# Patient Record
Sex: FEMALE | Race: WHITE | Employment: OTHER | URBAN - NONMETROPOLITAN AREA
[De-identification: names, ages, dates, MRNs, and addresses within clinical notes are randomized per-mention and may not be internally consistent; named-entity substitution may affect disease eponyms.]

---

## 2017-11-17 ENCOUNTER — HOSPITAL ENCOUNTER (INPATIENT)
Age: 82
LOS: 2 days | Discharge: HOME | DRG: 392 | End: 2017-11-19
Attending: INTERNAL MEDICINE | Admitting: INTERNAL MEDICINE
Payer: MEDICARE

## 2017-11-17 ENCOUNTER — APPOINTMENT (OUTPATIENT)
Dept: GENERAL RADIOLOGY | Age: 82
DRG: 392 | End: 2017-11-17
Payer: MEDICARE

## 2017-11-17 DIAGNOSIS — E87.6 HYPOKALEMIA: ICD-10-CM

## 2017-11-17 DIAGNOSIS — K52.9 GASTROENTERITIS: ICD-10-CM

## 2017-11-17 DIAGNOSIS — E86.0 DEHYDRATION: Primary | ICD-10-CM

## 2017-11-17 DIAGNOSIS — N17.9 ACUTE RENAL FAILURE, UNSPECIFIED ACUTE RENAL FAILURE TYPE (HCC): ICD-10-CM

## 2017-11-17 DIAGNOSIS — R00.1 BRADYCARDIA: ICD-10-CM

## 2017-11-17 LAB
ALBUMIN SERPL-MCNC: 3.7 G/DL (ref 3.5–5.1)
ALP BLD-CCNC: 82 U/L (ref 38–126)
ALT SERPL-CCNC: 13 U/L (ref 11–66)
AMYLASE: 21 U/L (ref 20–104)
ANION GAP SERPL CALCULATED.3IONS-SCNC: 16 MEQ/L (ref 8–16)
AST SERPL-CCNC: 21 U/L (ref 5–40)
BACTERIA: ABNORMAL /HPF
BANDED NEUTROPHILS ABSOLUTE COUNT: 0.3 THOU/MM3
BANDS PRESENT: 3 %
BASOPHILS # BLD: 0 %
BASOPHILS ABSOLUTE: 0 THOU/MM3 (ref 0–0.1)
BILIRUB SERPL-MCNC: 0.8 MG/DL (ref 0.3–1.2)
BILIRUBIN DIRECT: < 0.2 MG/DL (ref 0–0.3)
BILIRUBIN URINE: ABNORMAL
BLOOD, URINE: NEGATIVE
BUN BLDV-MCNC: 45 MG/DL (ref 7–22)
CALCIUM SERPL-MCNC: 8.8 MG/DL (ref 8.5–10.5)
CASTS 2: ABNORMAL /LPF
CASTS UA: ABNORMAL /LPF
CHARACTER, URINE: ABNORMAL
CHLORIDE BLD-SCNC: 99 MEQ/L (ref 98–111)
CO2: 22 MEQ/L (ref 23–33)
COLOR: YELLOW
CREAT SERPL-MCNC: 1.6 MG/DL (ref 0.4–1.2)
CREATININE URINE: 157.2 MG/DL
CRYSTALS, UA: ABNORMAL
DIFFERENTIAL, MANUAL: NORMAL
EKG ATRIAL RATE: 86 BPM
EKG P AXIS: 27 DEGREES
EKG P-R INTERVAL: 424 MS
EKG Q-T INTERVAL: 390 MS
EKG QRS DURATION: 136 MS
EKG QTC CALCULATION (BAZETT): 466 MS
EKG R AXIS: -61 DEGREES
EKG T AXIS: -7 DEGREES
EKG VENTRICULAR RATE: 86 BPM
EOSINOPHIL # BLD: 0 %
EOSINOPHILS ABSOLUTE: 0 THOU/MM3 (ref 0–0.4)
EPITHELIAL CELLS, UA: ABNORMAL /HPF
GFR SERPL CREATININE-BSD FRML MDRD: 30 ML/MIN/1.73M2
GLUCOSE BLD-MCNC: 128 MG/DL (ref 70–108)
GLUCOSE URINE: NEGATIVE MG/DL
HCT VFR BLD CALC: 41.4 % (ref 37–47)
HEMOGLOBIN: 14 GM/DL (ref 12–16)
ICTOTEST: NEGATIVE
KETONES, URINE: NEGATIVE
LACTIC ACID: 1 MMOL/L (ref 0.5–2.2)
LEUKOCYTE ESTERASE, URINE: NEGATIVE
LIPASE: 8.3 U/L (ref 5.6–51.3)
LYMPHOCYTES # BLD: 8 %
LYMPHOCYTES ABSOLUTE: 0.7 THOU/MM3 (ref 1–4.8)
MAGNESIUM: 2 MG/DL (ref 1.6–2.4)
MCH RBC QN AUTO: 30.1 PG (ref 27–31)
MCHC RBC AUTO-ENTMCNC: 33.9 GM/DL (ref 33–37)
MCV RBC AUTO: 89 FL (ref 81–99)
MISCELLANEOUS 2: ABNORMAL
MONOCYTES # BLD: 3 %
MONOCYTES ABSOLUTE: 0.3 THOU/MM3 (ref 0.4–1.3)
NITRITE, URINE: NEGATIVE
NUCLEATED RED BLOOD CELLS: 0 /100 WBC
OSMOLALITY CALCULATION: 287 MOSMOL/KG (ref 275–300)
PDW BLD-RTO: 13.8 % (ref 11.5–14.5)
PH UA: 5
PLATELET # BLD: 205 THOU/MM3 (ref 130–400)
PLATELET ESTIMATE: ADEQUATE
PMV BLD AUTO: 8.7 MCM (ref 7.4–10.4)
POTASSIUM SERPL-SCNC: 3.1 MEQ/L (ref 3.5–5.2)
PRO-BNP: 7128 PG/ML (ref 0–1800)
PROTEIN UA: 30
RBC # BLD: 4.65 MILL/MM3 (ref 4.2–5.4)
RBC URINE: ABNORMAL /HPF
RENAL EPITHELIAL, UA: ABNORMAL
SEG NEUTROPHILS: 86 %
SEGMENTED NEUTROPHILS ABSOLUTE COUNT: 7.3 THOU/MM3 (ref 1.8–7.7)
SODIUM BLD-SCNC: 137 MEQ/L (ref 135–145)
SPECIFIC GRAVITY, URINE: 1.02 (ref 1–1.03)
TOTAL PROTEIN: 7.2 G/DL (ref 6.1–8)
UROBILINOGEN, URINE: 0.2 EU/DL
WBC # BLD: 8.5 THOU/MM3 (ref 4.8–10.8)
WBC UA: ABNORMAL /HPF
YEAST: ABNORMAL

## 2017-11-17 PROCEDURE — 6360000002 HC RX W HCPCS

## 2017-11-17 PROCEDURE — 71020 XR CHEST STANDARD TWO VW: CPT

## 2017-11-17 PROCEDURE — 6360000002 HC RX W HCPCS: Performed by: INTERNAL MEDICINE

## 2017-11-17 PROCEDURE — 82570 ASSAY OF URINE CREATININE: CPT

## 2017-11-17 PROCEDURE — 81001 URINALYSIS AUTO W/SCOPE: CPT

## 2017-11-17 PROCEDURE — 85025 COMPLETE CBC W/AUTO DIFF WBC: CPT

## 2017-11-17 PROCEDURE — 96361 HYDRATE IV INFUSION ADD-ON: CPT

## 2017-11-17 PROCEDURE — 93005 ELECTROCARDIOGRAM TRACING: CPT

## 2017-11-17 PROCEDURE — 99285 EMERGENCY DEPT VISIT HI MDM: CPT

## 2017-11-17 PROCEDURE — 1200000003 HC TELEMETRY R&B

## 2017-11-17 PROCEDURE — 96374 THER/PROPH/DIAG INJ IV PUSH: CPT

## 2017-11-17 PROCEDURE — 82248 BILIRUBIN DIRECT: CPT

## 2017-11-17 PROCEDURE — 80053 COMPREHEN METABOLIC PANEL: CPT

## 2017-11-17 PROCEDURE — 83735 ASSAY OF MAGNESIUM: CPT

## 2017-11-17 PROCEDURE — 83880 ASSAY OF NATRIURETIC PEPTIDE: CPT

## 2017-11-17 PROCEDURE — 83605 ASSAY OF LACTIC ACID: CPT

## 2017-11-17 PROCEDURE — 84300 ASSAY OF URINE SODIUM: CPT

## 2017-11-17 PROCEDURE — 2580000003 HC RX 258: Performed by: INTERNAL MEDICINE

## 2017-11-17 PROCEDURE — 36415 COLL VENOUS BLD VENIPUNCTURE: CPT

## 2017-11-17 PROCEDURE — 83690 ASSAY OF LIPASE: CPT

## 2017-11-17 PROCEDURE — 82150 ASSAY OF AMYLASE: CPT

## 2017-11-17 RX ORDER — SODIUM CHLORIDE 0.9 % (FLUSH) 0.9 %
10 SYRINGE (ML) INJECTION EVERY 12 HOURS SCHEDULED
Status: DISCONTINUED | OUTPATIENT
Start: 2017-11-18 | End: 2017-11-19 | Stop reason: HOSPADM

## 2017-11-17 RX ORDER — LEVOTHYROXINE SODIUM 0.1 MG/1
100 TABLET ORAL DAILY
Status: DISCONTINUED | OUTPATIENT
Start: 2017-11-18 | End: 2017-11-19 | Stop reason: HOSPADM

## 2017-11-17 RX ORDER — NITROGLYCERIN 0.4 MG/1
0.4 TABLET SUBLINGUAL EVERY 5 MIN PRN
Status: DISCONTINUED | OUTPATIENT
Start: 2017-11-17 | End: 2017-11-19 | Stop reason: HOSPADM

## 2017-11-17 RX ORDER — GUAIFENESIN 600 MG/1
600 TABLET, EXTENDED RELEASE ORAL 2 TIMES DAILY
Status: DISCONTINUED | OUTPATIENT
Start: 2017-11-18 | End: 2017-11-18

## 2017-11-17 RX ORDER — PANTOPRAZOLE SODIUM 40 MG/1
40 TABLET, DELAYED RELEASE ORAL
Status: DISCONTINUED | OUTPATIENT
Start: 2017-11-18 | End: 2017-11-19 | Stop reason: HOSPADM

## 2017-11-17 RX ORDER — RANOLAZINE 500 MG/1
500 TABLET, EXTENDED RELEASE ORAL 2 TIMES DAILY
Status: DISCONTINUED | OUTPATIENT
Start: 2017-11-18 | End: 2017-11-18

## 2017-11-17 RX ORDER — ONDANSETRON 2 MG/ML
4 INJECTION INTRAMUSCULAR; INTRAVENOUS ONCE
Status: COMPLETED | OUTPATIENT
Start: 2017-11-17 | End: 2017-11-17

## 2017-11-17 RX ORDER — PRAVASTATIN SODIUM 20 MG
20 TABLET ORAL NIGHTLY
Status: DISCONTINUED | OUTPATIENT
Start: 2017-11-18 | End: 2017-11-19 | Stop reason: HOSPADM

## 2017-11-17 RX ORDER — SODIUM CHLORIDE 0.9 % (FLUSH) 0.9 %
10 SYRINGE (ML) INJECTION PRN
Status: DISCONTINUED | OUTPATIENT
Start: 2017-11-17 | End: 2017-11-19 | Stop reason: HOSPADM

## 2017-11-17 RX ORDER — HEPARIN SODIUM 5000 [USP'U]/ML
5000 INJECTION, SOLUTION INTRAVENOUS; SUBCUTANEOUS EVERY 8 HOURS SCHEDULED
Status: DISCONTINUED | OUTPATIENT
Start: 2017-11-17 | End: 2017-11-19 | Stop reason: HOSPADM

## 2017-11-17 RX ORDER — AMLODIPINE BESYLATE 5 MG/1
5 TABLET ORAL NIGHTLY
Status: DISCONTINUED | OUTPATIENT
Start: 2017-11-18 | End: 2017-11-19 | Stop reason: HOSPADM

## 2017-11-17 RX ORDER — ACETAMINOPHEN 325 MG/1
650 TABLET ORAL EVERY 4 HOURS PRN
Status: DISCONTINUED | OUTPATIENT
Start: 2017-11-17 | End: 2017-11-19 | Stop reason: HOSPADM

## 2017-11-17 RX ORDER — ONDANSETRON 2 MG/ML
4 INJECTION INTRAMUSCULAR; INTRAVENOUS EVERY 6 HOURS PRN
Status: DISCONTINUED | OUTPATIENT
Start: 2017-11-17 | End: 2017-11-19 | Stop reason: HOSPADM

## 2017-11-17 RX ORDER — CLOPIDOGREL BISULFATE 75 MG/1
75 TABLET ORAL DAILY
Status: DISCONTINUED | OUTPATIENT
Start: 2017-11-18 | End: 2017-11-18

## 2017-11-17 RX ORDER — ASPIRIN 81 MG/1
81 TABLET ORAL DAILY
Status: DISCONTINUED | OUTPATIENT
Start: 2017-11-18 | End: 2017-11-18

## 2017-11-17 RX ORDER — ONDANSETRON 2 MG/ML
INJECTION INTRAMUSCULAR; INTRAVENOUS
Status: COMPLETED
Start: 2017-11-17 | End: 2017-11-17

## 2017-11-17 RX ORDER — 0.9 % SODIUM CHLORIDE 0.9 %
1000 INTRAVENOUS SOLUTION INTRAVENOUS ONCE
Status: COMPLETED | OUTPATIENT
Start: 2017-11-17 | End: 2017-11-17

## 2017-11-17 RX ORDER — ALBUTEROL SULFATE 90 UG/1
2 AEROSOL, METERED RESPIRATORY (INHALATION) EVERY 6 HOURS PRN
Status: DISCONTINUED | OUTPATIENT
Start: 2017-11-17 | End: 2017-11-19 | Stop reason: HOSPADM

## 2017-11-17 RX ORDER — ALPRAZOLAM 0.25 MG/1
0.25 TABLET ORAL 3 TIMES DAILY PRN
Status: DISCONTINUED | OUTPATIENT
Start: 2017-11-17 | End: 2017-11-19 | Stop reason: HOSPADM

## 2017-11-17 RX ORDER — CARVEDILOL 6.25 MG/1
6.25 TABLET ORAL 2 TIMES DAILY WITH MEALS
Status: DISCONTINUED | OUTPATIENT
Start: 2017-11-18 | End: 2017-11-19 | Stop reason: HOSPADM

## 2017-11-17 RX ORDER — POTASSIUM CHLORIDE 14.9 MG/ML
20 INJECTION INTRAVENOUS ONCE
Status: COMPLETED | OUTPATIENT
Start: 2017-11-17 | End: 2017-11-17

## 2017-11-17 RX ORDER — POTASSIUM CHLORIDE 750 MG/1
10 TABLET, FILM COATED, EXTENDED RELEASE ORAL DAILY
Status: DISCONTINUED | OUTPATIENT
Start: 2017-11-18 | End: 2017-11-19 | Stop reason: HOSPADM

## 2017-11-17 RX ORDER — ISOSORBIDE MONONITRATE 60 MG/1
60 TABLET, EXTENDED RELEASE ORAL 2 TIMES DAILY
Status: DISCONTINUED | OUTPATIENT
Start: 2017-11-18 | End: 2017-11-18

## 2017-11-17 RX ORDER — SODIUM CHLORIDE 9 MG/ML
INJECTION, SOLUTION INTRAVENOUS CONTINUOUS
Status: DISCONTINUED | OUTPATIENT
Start: 2017-11-17 | End: 2017-11-19 | Stop reason: HOSPADM

## 2017-11-17 RX ADMIN — ONDANSETRON 4 MG: 2 INJECTION INTRAMUSCULAR; INTRAVENOUS at 18:44

## 2017-11-17 RX ADMIN — POTASSIUM CHLORIDE 20 MEQ: 200 INJECTION, SOLUTION INTRAVENOUS at 21:08

## 2017-11-17 RX ADMIN — SODIUM CHLORIDE 1000 ML: 9 INJECTION, SOLUTION INTRAVENOUS at 18:44

## 2017-11-17 ASSESSMENT — ENCOUNTER SYMPTOMS
RHINORRHEA: 0
BLOOD IN STOOL: 0
DIARRHEA: 1
WHEEZING: 0
EYE PAIN: 0
EYE DISCHARGE: 0
ABDOMINAL PAIN: 0
SHORTNESS OF BREATH: 0
SORE THROAT: 0
VOMITING: 1
CONSTIPATION: 0
BACK PAIN: 0
COUGH: 0
NAUSEA: 1

## 2017-11-17 NOTE — ED TRIAGE NOTES
Presents to ED with n/v/d that started yesterday evening patient was given 4 zofran by ems and resting in bed with resp easy and unlabored at this time call light in reach family at bedside

## 2017-11-17 NOTE — ED PROVIDER NOTES
[facility]  eMERGENCY dEPARTMENT eNCOUnter          [address] [facility]       Chief Complaint   Patient presents with    Nausea    Emesis    Diarrhea       Nurses Notes reviewed and I agree except as noted in the HPI. HISTORY OF PRESENT ILLNESS    Giuliana Chris is a 80 y.o. female who presents to the Emergency Department via EMS transport for the evaluation of nausea, vomiting, and diarrhea. Patient states that she has had symptoms since 11:30 pm last evening, and reports that she had meat loaf for dinner prior to experiencing them. She states that she also feels fatigued and has had chills. She reports that she has had recent sick exposure as her daughter was sick with similar symptoms earlier this week. She reports that she was given 4 Zofran en route to the hospital by EMS. Patient states that she is unsure how many episodes of diarrhea or vomiting she has had because she has lost count. She denies experiencing any chest pain, shortness of breath, abdominal pain, dysuria, dizziness, weakness, decreased urine output, or fever. She reports that she has not had a lot of fluids today. Patient reports that she has a history of hypertension, GERD, CAD, CHF, and chronic kidney disease. She states that her cardiologist is Dr. Joaquim Gutierrez, and that she last saw him in September. Patient denies further complaints at initial encounter. The HPI was provided by the patient. REVIEW OF SYSTEMS     Review of Systems   Constitutional: Positive for chills and fatigue. Negative for appetite change, diaphoresis and fever. HENT: Negative for congestion, ear pain, rhinorrhea and sore throat. Eyes: Negative for pain, discharge and visual disturbance. Respiratory: Negative for cough, shortness of breath and wheezing. Cardiovascular: Negative for chest pain, palpitations and leg swelling. Gastrointestinal: Positive for diarrhea, nausea and vomiting.  Negative for abdominal pain, blood in stool and constipation. Genitourinary: Negative for decreased urine volume, difficulty urinating, dysuria, frequency, hematuria, urgency and vaginal discharge. Musculoskeletal: Negative for arthralgias, back pain, joint swelling and neck pain. Skin: Negative for pallor and rash. Neurological: Negative for dizziness, syncope, weakness, light-headedness, numbness and headaches. Hematological: Negative for adenopathy. Psychiatric/Behavioral: Negative for confusion, dysphoric mood and suicidal ideas. The patient is not nervous/anxious. PAST MEDICAL HISTORY    has a past medical history of Blood transfusion reaction; CAD (coronary artery disease); CHF (congestive heart failure) ([facility]); CKD (chronic kidney disease) stage 3, GFR 30-59 ml/min; GERD (gastroesophageal reflux disease); History of bleeding ulcers; Hypertension; Pneumonia; and Thyroid disease. SURGICAL HISTORY      has a past surgical history that includes Cholecystectomy; Appendectomy; Tonsillectomy; Hysterectomy; Breast surgery (Left, 1959); fracture surgery (Right, 1986); Endoscopy, colon, diagnostic; Colonoscopy; and eye surgery (Bilateral, 2006). CURRENT MEDICATIONS       Previous Medications    ACETAMINOPHEN 650 MG TABS    Take 650 mg by mouth every 4 hours as needed. ALBUTEROL (PROAIR HFA) 108 (90 BASE) MCG/ACT INHALER    Inhale 2 puffs into the lungs every 6 hours as needed for Wheezing. ALPRAZOLAM (XANAX) 0.25 MG TABLET    Take 0.25 mg by mouth 3 times daily as needed for Anxiety. AMLODIPINE (NORVASC) 10 MG TABLET    Take 0.5 tablets by mouth nightly. ASPIRIN 81 MG EC TABLET    Take 81 mg by mouth daily. BUMETANIDE (BUMEX) 2 MG TABLET    Take 1 tablet by mouth daily    CARVEDILOL (COREG) 6.25 MG TABLET    Take 6.25 mg by mouth 2 times daily (with meals). CLOPIDOGREL (PLAVIX) 75 MG TABLET    Take 1 tablet by mouth daily. DOCUSATE SODIUM (COLACE, DULCOLAX) 100 MG CAPS    Take 100 mg by mouth daily. time. Vital signs are normal. She appears well-developed and well-nourished. She is active and cooperative. HENT:   Head: Normocephalic and atraumatic. Right Ear: External ear normal.   Left Ear: External ear normal.   Mouth/Throat: Oropharynx is clear and moist and mucous membranes are normal. Mucous membranes are not pale, not dry and not cyanotic. No oropharyngeal exudate, posterior oropharyngeal edema, posterior oropharyngeal erythema or tonsillar abscesses. Eyes: Conjunctivae and EOM are normal. Right eye exhibits no discharge. Left eye exhibits no discharge. No scleral icterus. Neck: Normal range of motion. Neck supple. No JVD present. Carotid bruit is not present. No tracheal deviation present. Cardiovascular: Normal rate, regular rhythm, intact distal pulses and normal pulses. Exam reveals no gallop and no friction rub. Murmur heard. Systolic murmur is present with a grade of 3/6   Pulses:       Radial pulses are 2+ on the right side, and 2+ on the left side. Pulmonary/Chest: Effort normal and breath sounds normal. No accessory muscle usage or stridor. No respiratory distress. She has no decreased breath sounds. She has no wheezes. She has no rhonchi. She has no rales. Abdominal: Soft. Normal appearance. She exhibits no distension. There is no tenderness. There is no rigidity, no rebound and no guarding. Musculoskeletal: Normal range of motion. She exhibits no edema. Neurological: She is alert and oriented to person, place, and time. She is not disoriented. No sensory deficit. She exhibits normal muscle tone. She displays no seizure activity. GCS eye subscore is 4. GCS verbal subscore is 5. GCS motor subscore is 6. Skin: Skin is warm and dry. No rash noted. She is not diaphoretic. Psychiatric: She has a normal mood and affect. Her speech is normal and behavior is normal. Thought content normal.   Nursing note and vitals reviewed.     DIAGNOSTIC RESULTS     EKG: All EKG's are interpreted by the Emergency Department Physician who either signs or Co-signs this chart in the absence of a cardiologist.  EKG interpreted by Jaquelin Flores MD:    EKG read and interpreted by myself with comparison to November 15, 2015 gives impression of sinus rhythm with 1st degree AV block with heart rate of 86; interval 424; ;QTc 466; axis 27 -61 -7. No STEMI     RADIOLOGY: non-plain film images(s) such as CT, Ultrasound and MRI are read by the radiologist.    XR CHEST STANDARD (2 VW)   Final Result   1. Borderline heart size. Moderately calcified aortic arch. 2. Large hiatus hernia which exits appears somewhat larger at this time than on the prior study. .   3. Mild left basilar atelectasis/pneumonia. **This report has been created using voice recognition software. It may contain minor errors which are inherent in voice recognition technology. **      Final report electronically signed by Dr. Silver Ghosh on 11/17/2017 7:33 PM      XR Chest Portable    (Results Pending)       LABS:   Labs Reviewed   CBC WITH AUTO DIFFERENTIAL - Abnormal; Notable for the following:        Result Value    Lymphocytes # 0.7 (*)     Monocytes # 0.3 (*)     All other components within normal limits   BASIC METABOLIC PANEL - Abnormal; Notable for the following:     Potassium 3.1 (*)     CO2 22 (*)     Glucose 128 (*)     BUN 45 (*)     CREATININE 1.6 (*)     All other components within normal limits   BRAIN NATRIURETIC PEPTIDE - Abnormal; Notable for the following:     Pro-BNP 7128.0 (*)     All other components within normal limits   URINE WITH REFLEXED MICRO - Abnormal; Notable for the following:     Bilirubin Urine SMALL (*)     Protein, UA 30 (*)     Character, Urine CLOUDY (*)     All other components within normal limits   GLOMERULAR FILTRATION RATE, ESTIMATED - Abnormal; Notable for the following:     Est, Glom Filt Rate 30 (*)     All other components within normal limits   HEPATIC FUNCTION PANEL   LIPASE

## 2017-11-18 LAB
ANION GAP SERPL CALCULATED.3IONS-SCNC: 12 MEQ/L (ref 8–16)
BUN BLDV-MCNC: 44 MG/DL (ref 7–22)
CALCIUM SERPL-MCNC: 8.1 MG/DL (ref 8.5–10.5)
CHLORIDE BLD-SCNC: 104 MEQ/L (ref 98–111)
CLOSTRIDIUM DIFFICILE, PCR: NEGATIVE
CO2: 23 MEQ/L (ref 23–33)
CREAT SERPL-MCNC: 1.6 MG/DL (ref 0.4–1.2)
GFR SERPL CREATININE-BSD FRML MDRD: 30 ML/MIN/1.73M2
GLUCOSE BLD-MCNC: 96 MG/DL (ref 70–108)
HCT VFR BLD CALC: 37.9 % (ref 37–47)
HEMOGLOBIN: 12.6 GM/DL (ref 12–16)
MCH RBC QN AUTO: 29.9 PG (ref 27–31)
MCHC RBC AUTO-ENTMCNC: 33.3 GM/DL (ref 33–37)
MCV RBC AUTO: 89.8 FL (ref 81–99)
PDW BLD-RTO: 13.8 % (ref 11.5–14.5)
PLATELET # BLD: 178 THOU/MM3 (ref 130–400)
PMV BLD AUTO: 8.1 MCM (ref 7.4–10.4)
POTASSIUM SERPL-SCNC: 3.5 MEQ/L (ref 3.5–5.2)
RBC # BLD: 4.22 MILL/MM3 (ref 4.2–5.4)
SODIUM BLD-SCNC: 139 MEQ/L (ref 135–145)
SODIUM URINE: < 20 MEQ/L
WBC # BLD: 5.4 THOU/MM3 (ref 4.8–10.8)

## 2017-11-18 PROCEDURE — 36415 COLL VENOUS BLD VENIPUNCTURE: CPT

## 2017-11-18 PROCEDURE — 80048 BASIC METABOLIC PNL TOTAL CA: CPT

## 2017-11-18 PROCEDURE — 6370000000 HC RX 637 (ALT 250 FOR IP): Performed by: INTERNAL MEDICINE

## 2017-11-18 PROCEDURE — 94640 AIRWAY INHALATION TREATMENT: CPT

## 2017-11-18 PROCEDURE — 85027 COMPLETE CBC AUTOMATED: CPT

## 2017-11-18 PROCEDURE — 2700000000 HC OXYGEN THERAPY PER DAY

## 2017-11-18 PROCEDURE — 87493 C DIFF AMPLIFIED PROBE: CPT

## 2017-11-18 PROCEDURE — 2580000003 HC RX 258: Performed by: INTERNAL MEDICINE

## 2017-11-18 PROCEDURE — 1200000003 HC TELEMETRY R&B

## 2017-11-18 PROCEDURE — 6360000002 HC RX W HCPCS: Performed by: INTERNAL MEDICINE

## 2017-11-18 RX ORDER — POTASSIUM CHLORIDE 20 MEQ/1
40 TABLET, EXTENDED RELEASE ORAL ONCE
Status: COMPLETED | OUTPATIENT
Start: 2017-11-18 | End: 2017-11-18

## 2017-11-18 RX ORDER — BUMETANIDE 1 MG/1
2 TABLET ORAL DAILY
Status: DISCONTINUED | OUTPATIENT
Start: 2017-11-19 | End: 2017-11-18

## 2017-11-18 RX ADMIN — HEPARIN SODIUM 5000 UNITS: 5000 INJECTION, SOLUTION INTRAVENOUS; SUBCUTANEOUS at 05:22

## 2017-11-18 RX ADMIN — PANTOPRAZOLE SODIUM 40 MG: 40 TABLET, DELAYED RELEASE ORAL at 05:21

## 2017-11-18 RX ADMIN — LEVOTHYROXINE SODIUM 100 MCG: 100 TABLET ORAL at 05:21

## 2017-11-18 RX ADMIN — PRAVASTATIN SODIUM 20 MG: 20 TABLET ORAL at 20:38

## 2017-11-18 RX ADMIN — CARVEDILOL 6.25 MG: 6.25 TABLET, FILM COATED ORAL at 17:50

## 2017-11-18 RX ADMIN — HEPARIN SODIUM 5000 UNITS: 5000 INJECTION, SOLUTION INTRAVENOUS; SUBCUTANEOUS at 14:28

## 2017-11-18 RX ADMIN — CARVEDILOL 6.25 MG: 6.25 TABLET, FILM COATED ORAL at 09:22

## 2017-11-18 RX ADMIN — SODIUM CHLORIDE: 9 INJECTION, SOLUTION INTRAVENOUS at 19:25

## 2017-11-18 RX ADMIN — SODIUM CHLORIDE: 9 INJECTION, SOLUTION INTRAVENOUS at 04:19

## 2017-11-18 RX ADMIN — POTASSIUM CHLORIDE 10 MEQ: 750 TABLET, FILM COATED, EXTENDED RELEASE ORAL at 09:22

## 2017-11-18 RX ADMIN — ONDANSETRON 4 MG: 2 INJECTION INTRAMUSCULAR; INTRAVENOUS at 20:38

## 2017-11-18 RX ADMIN — POTASSIUM CHLORIDE 40 MEQ: 1500 TABLET, EXTENDED RELEASE ORAL at 09:22

## 2017-11-18 RX ADMIN — HEPARIN SODIUM 5000 UNITS: 5000 INJECTION, SOLUTION INTRAVENOUS; SUBCUTANEOUS at 22:30

## 2017-11-18 RX ADMIN — AMLODIPINE BESYLATE 5 MG: 5 TABLET ORAL at 20:38

## 2017-11-18 RX ADMIN — Medication 2 PUFF: at 09:14

## 2017-11-18 RX ADMIN — Medication 2 PUFF: at 21:58

## 2017-11-18 ASSESSMENT — PAIN SCALES - GENERAL
PAINLEVEL_OUTOF10: 0

## 2017-11-18 NOTE — PLAN OF CARE
Problem: Impaired respiratory status  Goal: Clear lung sounds  Clear lung sounds    Outcome: Ongoing  Cont mdi to improve aeration

## 2017-11-18 NOTE — ED NOTES
Patient resting in bed call light in reach states no needs at this time no acute distress noted respirations easy and unlabored at this time        Jessica Loredoster, RN  11/17/17 2031

## 2017-11-18 NOTE — PLAN OF CARE
Problem: Infection:  Goal: Will remain free from infection  Will remain free from infection   Outcome: Ongoing  Patient remains in [employer] isolation this shift to prevent the spread of infectious agents. Problem: Safety:  Goal: Free from accidental physical injury  Free from accidental physical injury   Outcome: Ongoing  Patient utilizes call light appropriately for needs. Good safety awareness noted. Problem: Daily Care:  Goal: Daily care needs are met  Daily care needs are met   Outcome: Ongoing  Patient accepts SBA with ADLs. Able to perform ADLs with minimal assistance. Problem: Skin Integrity:  Goal: Skin integrity will stabilize  Skin integrity will stabilize   Outcome: Ongoing  No new skin breakdown noted this shift. Problem: Discharge Planning:  Goal: Patients continuum of care needs are met  Patients continuum of care needs are met   Outcome: Ongoing  Discharge plan reviewed with patient. Patient continues to actively participate in current discharge plan. Problem: GI  Goal: No bowel complications  Outcome: Ongoing  Bowel sounds active in all quadrants. Patient continues to have some episodes of diarrhea, as reported from previous shift. Problem: Falls - Risk of  Goal: Absence of falls  Outcome: Ongoing  Fall assessment completed. Patient utilizes call light appropriately for needs. Patient accepts non-skid socks as fall preventative. Fall band in place on patient's arm. Fall signs posted. Comments: Plan of care reviewed with patient. Patient verbalizes understanding and participates in goal setting.

## 2017-11-18 NOTE — H&P
139   K  3.1*  3.5   CL  99  104   CO2  22*  23   BUN  45*  44*   CREATININE  1.6*  1.6*   GLUCOSE  128*  96     Hepatic:   Recent Labs      11/17/17   1839   AST  21   ALT  13   BILITOT  0.8   ALKPHOS  82     Pro-BNP 7128.0 (H)     Clostridium difficile, PCR NEGATIVE     CXR:  1. Borderline heart size. Moderately calcified aortic arch. 2. Large hiatus hernia which exits appears somewhat larger at this time than on the prior study. .  3. Mild left basilar atelectasis/pneumonia. Assessment and Plan   1. Acute gastroenteritis  2. Hypokalemia  3. HTN  4. Hiatus hernia    Cont IV hydration. Hold diuretics. PPI. Replace K. Am labs. Lovenox.     Patient Active Problem List   Diagnosis Code    Obesity E66.9    CKD (chronic kidney disease) stage 3, GFR 30-59 ml/min N18.3    Essential hypertension E57    Diastolic CHF, chronic (HCC) I50.32    Acute respiratory failure with hypoxia (HCC) J96.01    Hypothyroidism E03.9    Bradycardia R00.1    Gastroenteritis K52.9       Love Corral MD  Admitting Internist

## 2017-11-19 VITALS
BODY MASS INDEX: 38.07 KG/M2 | DIASTOLIC BLOOD PRESSURE: 61 MMHG | HEIGHT: 60 IN | OXYGEN SATURATION: 93 % | WEIGHT: 193.9 LBS | SYSTOLIC BLOOD PRESSURE: 135 MMHG | TEMPERATURE: 97.8 F | HEART RATE: 57 BPM | RESPIRATION RATE: 20 BRPM

## 2017-11-19 PROBLEM — E87.6 HYPOKALEMIA: Status: ACTIVE | Noted: 2017-11-19

## 2017-11-19 PROBLEM — K52.9 GASTROENTERITIS, ACUTE: Status: ACTIVE | Noted: 2017-11-19

## 2017-11-19 LAB
ANION GAP SERPL CALCULATED.3IONS-SCNC: 12 MEQ/L (ref 8–16)
BUN BLDV-MCNC: 32 MG/DL (ref 7–22)
CALCIUM SERPL-MCNC: 8.3 MG/DL (ref 8.5–10.5)
CHLORIDE BLD-SCNC: 108 MEQ/L (ref 98–111)
CO2: 21 MEQ/L (ref 23–33)
CREAT SERPL-MCNC: 1.3 MG/DL (ref 0.4–1.2)
GFR SERPL CREATININE-BSD FRML MDRD: 38 ML/MIN/1.73M2
GLUCOSE BLD-MCNC: 89 MG/DL (ref 70–108)
POTASSIUM SERPL-SCNC: 4.6 MEQ/L (ref 3.5–5.2)
SODIUM BLD-SCNC: 141 MEQ/L (ref 135–145)

## 2017-11-19 PROCEDURE — 2700000000 HC OXYGEN THERAPY PER DAY

## 2017-11-19 PROCEDURE — 94640 AIRWAY INHALATION TREATMENT: CPT

## 2017-11-19 PROCEDURE — 80048 BASIC METABOLIC PNL TOTAL CA: CPT

## 2017-11-19 PROCEDURE — 2580000003 HC RX 258: Performed by: INTERNAL MEDICINE

## 2017-11-19 PROCEDURE — 6370000000 HC RX 637 (ALT 250 FOR IP): Performed by: INTERNAL MEDICINE

## 2017-11-19 PROCEDURE — 6360000002 HC RX W HCPCS: Performed by: INTERNAL MEDICINE

## 2017-11-19 PROCEDURE — 36415 COLL VENOUS BLD VENIPUNCTURE: CPT

## 2017-11-19 RX ORDER — PSEUDOEPHEDRINE HCL 30 MG
100 TABLET ORAL DAILY PRN
Qty: 60 CAPSULE | Refills: 0 | Status: ON HOLD | OUTPATIENT
Start: 2017-11-19 | End: 2019-01-01

## 2017-11-19 RX ADMIN — PANTOPRAZOLE SODIUM 40 MG: 40 TABLET, DELAYED RELEASE ORAL at 05:00

## 2017-11-19 RX ADMIN — LEVOTHYROXINE SODIUM 100 MCG: 100 TABLET ORAL at 05:00

## 2017-11-19 RX ADMIN — Medication 2 PUFF: at 09:48

## 2017-11-19 RX ADMIN — HEPARIN SODIUM 5000 UNITS: 5000 INJECTION, SOLUTION INTRAVENOUS; SUBCUTANEOUS at 05:00

## 2017-11-19 RX ADMIN — SODIUM CHLORIDE: 9 INJECTION, SOLUTION INTRAVENOUS at 08:57

## 2017-11-19 RX ADMIN — POTASSIUM CHLORIDE 10 MEQ: 750 TABLET, FILM COATED, EXTENDED RELEASE ORAL at 08:55

## 2017-11-19 RX ADMIN — CARVEDILOL 6.25 MG: 6.25 TABLET, FILM COATED ORAL at 08:55

## 2017-11-19 ASSESSMENT — PAIN SCALES - GENERAL
PAINLEVEL_OUTOF10: 0

## 2017-11-19 NOTE — DISCHARGE SUMMARY
nitroGLYCERIN (NITROSTAT) 0.4 MG SL tablet  Place 1 tablet under the tongue every 5 minutes as needed for Chest pain. omeprazole (PRILOSEC) 40 MG capsule  Take 1 capsule by mouth 2 times daily (before meals). potassium chloride SA (K-DUR;KLOR-CON M) 20 MEQ tablet  Take 0.5 tablets by mouth daily. pravastatin (PRAVACHOL) 20 MG tablet  Take 20 mg by mouth nightly. Consults:  none    Significant Diagnostic Studies: labs: CBC:        Recent Labs      11/17/17 1839 11/18/17   0642   WBC  8.5  5.4   HGB  14.0  12.6   PLT  205  178      BMP:          Recent Labs      11/17/17 1839 11/18/17 0642  11/19/17   0639   NA  137  139  141   K  3.1*  3.5  4.6   CL  99  104  108   CO2  22*  23  21*   BUN  45*  44*  32*   CREATININE  1.6*  1.6*  1.3*   GLUCOSE  128*  96  89      Hepatic:       Recent Labs      11/17/17 1839   AST  21   ALT  13   BILITOT  0.8   ALKPHOS  82      Magnesium:          Lab Results   Component Value Date     MG 2.0 11/17/2017         Assessment and Plan:   6. Acute gastroenteritis  7. Hypokalemia  8. HTN  9. Hiatus hernia  Improved clinically. Will dc home today.     Treatments:   IV hydration, replaced K,     Disposition:   home    Signed:  Carina Villeda  11/19/2017, 1:54 PM

## 2017-11-19 NOTE — PROGRESS NOTES
Patient discharged to home at this time. Discharge instructions reviewed with patient and patient's daughter, Estefania Ko and follow-up appointments discussed. Patient instructed to make follow-up appointment with primary physician within 5-7 days, as physician's office closed today. All questions answered. Patient left facility via private vehicle, accompanied by patient's daughter. No concerns voices by patient at time of discharge. Patient refuses O2 at time of discharge. Patient states \"I only wear it at night at home and don't need it right now. \"

## 2017-11-19 NOTE — PLAN OF CARE
Problem: Infection:  Goal: Will remain free from infection  Will remain free from infection   Outcome: Ongoing  Continuing to monitor for indications of infection. Problem: Safety:  Goal: Free from accidental physical injury  Free from accidental physical injury   Outcome: Ongoing  Patient utilizes call light appropriately for needs. Good safety awareness noted. Problem: Daily Care:  Goal: Daily care needs are met  Daily care needs are met   Outcome: Ongoing  SBA given with ADLs this shift. Problem: Skin Integrity:  Goal: Skin integrity will stabilize  Skin integrity will stabilize   Outcome: Ongoing  No new skin breakdown noted this shift. Problem: Discharge Planning:  Goal: Patients continuum of care needs are met  Patients continuum of care needs are met   Outcome: Ongoing  Discharge plan reviewed with patient. Patient continues to actively participate in current discharge plan. Problem: Falls - Risk of  Goal: Absence of falls  Outcome: Ongoing  Fall assessment completed. Patient utilizes call light appropriately for needs. Patient accepts non-skid socks as fall preventative. Fall band in place on patient's arm. Fall signs posted. Comments: Plan of care reviewed with patient. Patient verbalizes understanding and participates in goal setting.

## 2017-11-19 NOTE — PLAN OF CARE
Problem: Impaired respiratory status  Goal: Clear lung sounds  Clear lung sounds     Outcome: Ongoing  Dulera MDI continued BID to help maintain clear airways.

## 2019-01-01 ENCOUNTER — APPOINTMENT (OUTPATIENT)
Dept: GENERAL RADIOLOGY | Age: 84
DRG: 291 | End: 2019-01-01
Payer: MEDICARE

## 2019-01-01 ENCOUNTER — HOSPITAL ENCOUNTER (INPATIENT)
Age: 84
LOS: 7 days | Discharge: HOME | DRG: 193 | End: 2019-04-22
Attending: INTERNAL MEDICINE | Admitting: INTERNAL MEDICINE
Payer: MEDICARE

## 2019-01-01 ENCOUNTER — TELEPHONE (OUTPATIENT)
Dept: PULMONOLOGY | Age: 84
End: 2019-01-01

## 2019-01-01 ENCOUNTER — APPOINTMENT (OUTPATIENT)
Dept: GENERAL RADIOLOGY | Age: 84
DRG: 378 | End: 2019-01-01
Payer: MEDICARE

## 2019-01-01 ENCOUNTER — HOSPITAL ENCOUNTER (OUTPATIENT)
Dept: RESPIRATORY THERAPY | Age: 84
Discharge: HOME | End: 2019-08-01
Payer: MEDICARE

## 2019-01-01 ENCOUNTER — HOSPITAL ENCOUNTER (OUTPATIENT)
Dept: GENERAL RADIOLOGY | Age: 84
Discharge: HOME | End: 2019-07-16
Payer: MEDICARE

## 2019-01-01 ENCOUNTER — HOSPITAL ENCOUNTER (INPATIENT)
Age: 84
LOS: 9 days | Discharge: NURSING, REHAB OR LONG-TERM CARE | DRG: 291 | End: 2019-12-02
Attending: INTERNAL MEDICINE | Admitting: INTERNAL MEDICINE
Payer: MEDICARE

## 2019-01-01 ENCOUNTER — OFFICE VISIT (OUTPATIENT)
Dept: PULMONOLOGY | Age: 84
End: 2019-01-01
Payer: MEDICARE

## 2019-01-01 ENCOUNTER — APPOINTMENT (OUTPATIENT)
Dept: NUCLEAR MEDICINE | Age: 84
DRG: 378 | End: 2019-01-01
Payer: MEDICARE

## 2019-01-01 ENCOUNTER — APPOINTMENT (OUTPATIENT)
Dept: CT IMAGING | Age: 84
DRG: 291 | End: 2019-01-01
Payer: MEDICARE

## 2019-01-01 ENCOUNTER — HOSPITAL ENCOUNTER (OUTPATIENT)
Age: 84
Discharge: HOME | End: 2019-07-16
Payer: MEDICARE

## 2019-01-01 ENCOUNTER — HOSPITAL ENCOUNTER (OUTPATIENT)
Age: 84
Setting detail: OBSERVATION
Discharge: HOME | End: 2019-01-11
Attending: INTERNAL MEDICINE | Admitting: INTERNAL MEDICINE
Payer: MEDICARE

## 2019-01-01 ENCOUNTER — HOSPITAL ENCOUNTER (INPATIENT)
Age: 84
LOS: 1 days | DRG: 194 | End: 2019-12-03
Attending: PHYSICAL MEDICINE & REHABILITATION | Admitting: PHYSICAL MEDICINE & REHABILITATION
Payer: MEDICARE

## 2019-01-01 ENCOUNTER — APPOINTMENT (OUTPATIENT)
Dept: GENERAL RADIOLOGY | Age: 84
DRG: 193 | End: 2019-01-01
Payer: MEDICARE

## 2019-01-01 ENCOUNTER — CARE COORDINATION (OUTPATIENT)
Dept: CASE MANAGEMENT | Age: 84
End: 2019-01-01

## 2019-01-01 ENCOUNTER — HOSPITAL ENCOUNTER (OUTPATIENT)
Dept: RESPIRATORY THERAPY | Age: 84
Discharge: HOME | End: 2019-08-30
Payer: MEDICARE

## 2019-01-01 ENCOUNTER — APPOINTMENT (OUTPATIENT)
Dept: CT IMAGING | Age: 84
DRG: 193 | End: 2019-01-01
Payer: MEDICARE

## 2019-01-01 ENCOUNTER — HOSPITAL ENCOUNTER (INPATIENT)
Age: 84
LOS: 8 days | Discharge: HOME HEALTH | DRG: 378 | End: 2019-11-15
Attending: EMERGENCY MEDICINE | Admitting: INTERNAL MEDICINE
Payer: MEDICARE

## 2019-01-01 ENCOUNTER — APPOINTMENT (OUTPATIENT)
Dept: GENERAL RADIOLOGY | Age: 84
DRG: 194 | End: 2019-01-01
Attending: PHYSICAL MEDICINE & REHABILITATION
Payer: MEDICARE

## 2019-01-01 ENCOUNTER — APPOINTMENT (OUTPATIENT)
Dept: INTERVENTIONAL RADIOLOGY/VASCULAR | Age: 84
DRG: 193 | End: 2019-01-01
Payer: MEDICARE

## 2019-01-01 ENCOUNTER — TELEPHONE (OUTPATIENT)
Dept: ADMINISTRATIVE | Age: 84
End: 2019-01-01

## 2019-01-01 VITALS
OXYGEN SATURATION: 93 % | SYSTOLIC BLOOD PRESSURE: 152 MMHG | BODY MASS INDEX: 37.6 KG/M2 | WEIGHT: 186.5 LBS | HEART RATE: 67 BPM | DIASTOLIC BLOOD PRESSURE: 69 MMHG | RESPIRATION RATE: 16 BRPM | TEMPERATURE: 98 F | HEIGHT: 59 IN

## 2019-01-01 VITALS
SYSTOLIC BLOOD PRESSURE: 118 MMHG | RESPIRATION RATE: 16 BRPM | TEMPERATURE: 97.9 F | OXYGEN SATURATION: 87 % | BODY MASS INDEX: 35.58 KG/M2 | WEIGHT: 176.5 LBS | HEART RATE: 63 BPM | HEIGHT: 59 IN | DIASTOLIC BLOOD PRESSURE: 61 MMHG

## 2019-01-01 VITALS
BODY MASS INDEX: 38.1 KG/M2 | WEIGHT: 189 LBS | HEART RATE: 57 BPM | SYSTOLIC BLOOD PRESSURE: 124 MMHG | OXYGEN SATURATION: 94 % | DIASTOLIC BLOOD PRESSURE: 68 MMHG | HEIGHT: 59 IN

## 2019-01-01 VITALS
BODY MASS INDEX: 37.01 KG/M2 | DIASTOLIC BLOOD PRESSURE: 66 MMHG | WEIGHT: 183.6 LBS | HEART RATE: 75 BPM | TEMPERATURE: 98.1 F | HEIGHT: 59 IN | RESPIRATION RATE: 18 BRPM | SYSTOLIC BLOOD PRESSURE: 142 MMHG | OXYGEN SATURATION: 94 %

## 2019-01-01 VITALS
DIASTOLIC BLOOD PRESSURE: 53 MMHG | HEART RATE: 74 BPM | RESPIRATION RATE: 18 BRPM | BODY MASS INDEX: 36.99 KG/M2 | WEIGHT: 183.5 LBS | OXYGEN SATURATION: 93 % | TEMPERATURE: 97.7 F | HEIGHT: 59 IN | SYSTOLIC BLOOD PRESSURE: 127 MMHG

## 2019-01-01 VITALS
WEIGHT: 171 LBS | SYSTOLIC BLOOD PRESSURE: 135 MMHG | OXYGEN SATURATION: 96 % | RESPIRATION RATE: 16 BRPM | HEIGHT: 59 IN | HEART RATE: 60 BPM | DIASTOLIC BLOOD PRESSURE: 54 MMHG | BODY MASS INDEX: 34.47 KG/M2 | TEMPERATURE: 97.3 F

## 2019-01-01 DIAGNOSIS — J40 BRONCHITIS: ICD-10-CM

## 2019-01-01 DIAGNOSIS — I50.32 CHRONIC HEART FAILURE WITH PRESERVED EJECTION FRACTION (HCC): ICD-10-CM

## 2019-01-01 DIAGNOSIS — K62.5 RECTAL BLEEDING: Primary | ICD-10-CM

## 2019-01-01 DIAGNOSIS — J96.21 ACUTE AND CHRONIC RESPIRATORY FAILURE WITH HYPOXIA (HCC): ICD-10-CM

## 2019-01-01 DIAGNOSIS — K92.2 LOWER GI BLEED: ICD-10-CM

## 2019-01-01 DIAGNOSIS — R07.9 CHEST PAIN, UNSPECIFIED TYPE: Primary | ICD-10-CM

## 2019-01-01 DIAGNOSIS — G47.34 NOCTURNAL HYPOXIA: Primary | ICD-10-CM

## 2019-01-01 DIAGNOSIS — J18.9 PNEUMONIA DUE TO INFECTIOUS ORGANISM, UNSPECIFIED LATERALITY, UNSPECIFIED PART OF LUNG: ICD-10-CM

## 2019-01-01 DIAGNOSIS — G47.34 NOCTURNAL HYPOXIA: ICD-10-CM

## 2019-01-01 DIAGNOSIS — R06.89 DYSPNEA AND RESPIRATORY ABNORMALITIES: Primary | ICD-10-CM

## 2019-01-01 DIAGNOSIS — J96.11 CHRONIC RESPIRATORY FAILURE WITH HYPOXIA (HCC): ICD-10-CM

## 2019-01-01 DIAGNOSIS — R06.00 DYSPNEA AND RESPIRATORY ABNORMALITIES: Primary | ICD-10-CM

## 2019-01-01 DIAGNOSIS — R05.9 COUGH: Primary | ICD-10-CM

## 2019-01-01 DIAGNOSIS — I50.32 CHRONIC DIASTOLIC CONGESTIVE HEART FAILURE (HCC): ICD-10-CM

## 2019-01-01 LAB
ABO: NORMAL
ALBUMIN SERPL-MCNC: 3.2 G/DL (ref 3.5–5.1)
ALBUMIN SERPL-MCNC: 3.5 G/DL (ref 3.5–5.1)
ALBUMIN SERPL-MCNC: 3.6 G/DL (ref 3.5–5.1)
ALBUMIN SERPL-MCNC: 3.7 G/DL (ref 3.5–5.1)
ALBUMIN SERPL-MCNC: 3.9 G/DL (ref 3.5–5.1)
ALBUMIN SERPL-MCNC: 4.1 G/DL (ref 3.5–5.1)
ALLEN TEST: POSITIVE
ALP BLD-CCNC: 74 U/L (ref 38–126)
ALP BLD-CCNC: 78 U/L (ref 38–126)
ALP BLD-CCNC: 86 U/L (ref 38–126)
ALP BLD-CCNC: 96 U/L (ref 38–126)
ALP BLD-CCNC: 97 U/L (ref 38–126)
ALP BLD-CCNC: 99 U/L (ref 38–126)
ALT SERPL-CCNC: 10 U/L (ref 11–66)
ALT SERPL-CCNC: 7 U/L (ref 11–66)
ALT SERPL-CCNC: 7 U/L (ref 11–66)
ALT SERPL-CCNC: 8 U/L (ref 11–66)
ANION GAP SERPL CALCULATED.3IONS-SCNC: 10 MEQ/L (ref 8–16)
ANION GAP SERPL CALCULATED.3IONS-SCNC: 11 MEQ/L (ref 8–16)
ANION GAP SERPL CALCULATED.3IONS-SCNC: 12 MEQ/L (ref 8–16)
ANION GAP SERPL CALCULATED.3IONS-SCNC: 13 MEQ/L (ref 8–16)
ANION GAP SERPL CALCULATED.3IONS-SCNC: 14 MEQ/L (ref 8–16)
ANION GAP SERPL CALCULATED.3IONS-SCNC: 15 MEQ/L (ref 8–16)
ANION GAP SERPL CALCULATED.3IONS-SCNC: 16 MEQ/L (ref 8–16)
ANION GAP SERPL CALCULATED.3IONS-SCNC: 9 MEQ/L (ref 8–16)
ANTIBODY IDENTIFICATION: NORMAL
ANTIBODY SCREEN: NORMAL
AST SERPL-CCNC: 15 U/L (ref 5–40)
AST SERPL-CCNC: 17 U/L (ref 5–40)
AST SERPL-CCNC: 18 U/L (ref 5–40)
AST SERPL-CCNC: 19 U/L (ref 5–40)
AST SERPL-CCNC: 20 U/L (ref 5–40)
AST SERPL-CCNC: 24 U/L (ref 5–40)
BASE EXCESS (CALCULATED): 15.3 MMOL/L (ref -2.5–2.5)
BASOPHILS # BLD: 0.2 %
BASOPHILS # BLD: 0.4 %
BASOPHILS # BLD: 0.4 %
BASOPHILS # BLD: 0.6 %
BASOPHILS # BLD: 0.6 %
BASOPHILS ABSOLUTE: 0 THOU/MM3 (ref 0–0.1)
BASOPHILS ABSOLUTE: 0.1 THOU/MM3 (ref 0–0.1)
BASOPHILS ABSOLUTE: 0.1 THOU/MM3 (ref 0–0.1)
BILIRUB SERPL-MCNC: 0.7 MG/DL (ref 0.3–1.2)
BILIRUB SERPL-MCNC: 0.8 MG/DL (ref 0.3–1.2)
BILIRUB SERPL-MCNC: 0.8 MG/DL (ref 0.3–1.2)
BILIRUB SERPL-MCNC: 1 MG/DL (ref 0.3–1.2)
BILIRUB SERPL-MCNC: 1.1 MG/DL (ref 0.3–1.2)
BILIRUB SERPL-MCNC: 1.2 MG/DL (ref 0.3–1.2)
BILIRUBIN DIRECT: < 0.2 MG/DL (ref 0–0.3)
BLOOD CULTURE, ROUTINE: NORMAL
BLOOD CULTURE, ROUTINE: NORMAL
BUN BLDV-MCNC: 19 MG/DL (ref 7–22)
BUN BLDV-MCNC: 20 MG/DL (ref 7–22)
BUN BLDV-MCNC: 22 MG/DL (ref 7–22)
BUN BLDV-MCNC: 22 MG/DL (ref 7–22)
BUN BLDV-MCNC: 23 MG/DL (ref 7–22)
BUN BLDV-MCNC: 23 MG/DL (ref 7–22)
BUN BLDV-MCNC: 24 MG/DL (ref 7–22)
BUN BLDV-MCNC: 25 MG/DL (ref 7–22)
BUN BLDV-MCNC: 25 MG/DL (ref 7–22)
BUN BLDV-MCNC: 28 MG/DL (ref 7–22)
BUN BLDV-MCNC: 29 MG/DL (ref 7–22)
BUN BLDV-MCNC: 30 MG/DL (ref 7–22)
BUN BLDV-MCNC: 31 MG/DL (ref 7–22)
BUN BLDV-MCNC: 32 MG/DL (ref 7–22)
BUN BLDV-MCNC: 34 MG/DL (ref 7–22)
BUN BLDV-MCNC: 36 MG/DL (ref 7–22)
BUN BLDV-MCNC: 39 MG/DL (ref 7–22)
BUN BLDV-MCNC: 39 MG/DL (ref 7–22)
BUN BLDV-MCNC: 40 MG/DL (ref 7–22)
CALCIUM SERPL-MCNC: 10 MG/DL (ref 8.5–10.5)
CALCIUM SERPL-MCNC: 10.4 MG/DL (ref 8.5–10.5)
CALCIUM SERPL-MCNC: 8.8 MG/DL (ref 8.5–10.5)
CALCIUM SERPL-MCNC: 9.2 MG/DL (ref 8.5–10.5)
CALCIUM SERPL-MCNC: 9.4 MG/DL (ref 8.5–10.5)
CALCIUM SERPL-MCNC: 9.5 MG/DL (ref 8.5–10.5)
CALCIUM SERPL-MCNC: 9.6 MG/DL (ref 8.5–10.5)
CALCIUM SERPL-MCNC: 9.7 MG/DL (ref 8.5–10.5)
CALCIUM SERPL-MCNC: 9.7 MG/DL (ref 8.5–10.5)
CALCIUM SERPL-MCNC: 9.8 MG/DL (ref 8.5–10.5)
CALCIUM SERPL-MCNC: 9.9 MG/DL (ref 8.5–10.5)
CHLORIDE BLD-SCNC: 101 MEQ/L (ref 98–111)
CHLORIDE BLD-SCNC: 79 MEQ/L (ref 98–111)
CHLORIDE BLD-SCNC: 79 MEQ/L (ref 98–111)
CHLORIDE BLD-SCNC: 81 MEQ/L (ref 98–111)
CHLORIDE BLD-SCNC: 81 MEQ/L (ref 98–111)
CHLORIDE BLD-SCNC: 84 MEQ/L (ref 98–111)
CHLORIDE BLD-SCNC: 86 MEQ/L (ref 98–111)
CHLORIDE BLD-SCNC: 88 MEQ/L (ref 98–111)
CHLORIDE BLD-SCNC: 91 MEQ/L (ref 98–111)
CHLORIDE BLD-SCNC: 92 MEQ/L (ref 98–111)
CHLORIDE BLD-SCNC: 92 MEQ/L (ref 98–111)
CHLORIDE BLD-SCNC: 93 MEQ/L (ref 98–111)
CHLORIDE BLD-SCNC: 93 MEQ/L (ref 98–111)
CHLORIDE BLD-SCNC: 94 MEQ/L (ref 98–111)
CHLORIDE BLD-SCNC: 96 MEQ/L (ref 98–111)
CHLORIDE BLD-SCNC: 96 MEQ/L (ref 98–111)
CHLORIDE BLD-SCNC: 97 MEQ/L (ref 98–111)
CHLORIDE BLD-SCNC: 98 MEQ/L (ref 98–111)
CHLORIDE BLD-SCNC: 99 MEQ/L (ref 98–111)
CHOLESTEROL, TOTAL: 159 MG/DL (ref 100–199)
CHOLESTEROL, TOTAL: 181 MG/DL (ref 100–199)
CO2: 24 MEQ/L (ref 23–33)
CO2: 25 MEQ/L (ref 23–33)
CO2: 26 MEQ/L (ref 23–33)
CO2: 26 MEQ/L (ref 23–33)
CO2: 27 MEQ/L (ref 23–33)
CO2: 29 MEQ/L (ref 23–33)
CO2: 29 MEQ/L (ref 23–33)
CO2: 30 MEQ/L (ref 23–33)
CO2: 30 MEQ/L (ref 23–33)
CO2: 31 MEQ/L (ref 23–33)
CO2: 31 MEQ/L (ref 23–33)
CO2: 32 MEQ/L (ref 23–33)
CO2: 33 MEQ/L (ref 23–33)
CO2: 33 MEQ/L (ref 23–33)
CO2: 34 MEQ/L (ref 23–33)
CO2: 34 MEQ/L (ref 23–33)
CO2: 35 MEQ/L (ref 23–33)
CO2: 35 MEQ/L (ref 23–33)
CO2: 37 MEQ/L (ref 23–33)
COLLECTED BY:: ABNORMAL
CREAT SERPL-MCNC: 1.3 MG/DL (ref 0.4–1.2)
CREAT SERPL-MCNC: 1.4 MG/DL (ref 0.4–1.2)
CREAT SERPL-MCNC: 1.5 MG/DL (ref 0.4–1.2)
CREAT SERPL-MCNC: 1.6 MG/DL (ref 0.4–1.2)
CREAT SERPL-MCNC: 1.6 MG/DL (ref 0.4–1.2)
CREAT SERPL-MCNC: 1.7 MG/DL (ref 0.4–1.2)
CREAT SERPL-MCNC: 1.9 MG/DL (ref 0.4–1.2)
DEVICE: ABNORMAL
EKG ATRIAL RATE: 277 BPM
EKG ATRIAL RATE: 277 BPM
EKG ATRIAL RATE: 312 BPM
EKG ATRIAL RATE: 67 BPM
EKG ATRIAL RATE: 72 BPM
EKG ATRIAL RATE: 81 BPM
EKG ATRIAL RATE: 83 BPM
EKG P AXIS: 72 DEGREES
EKG P AXIS: 75 DEGREES
EKG P AXIS: 75 DEGREES
EKG P AXIS: 80 DEGREES
EKG P AXIS: 86 DEGREES
EKG P-R INTERVAL: 316 MS
EKG P-R INTERVAL: 332 MS
EKG P-R INTERVAL: 400 MS
EKG P-R INTERVAL: 416 MS
EKG Q-T INTERVAL: 360 MS
EKG Q-T INTERVAL: 366 MS
EKG Q-T INTERVAL: 380 MS
EKG Q-T INTERVAL: 386 MS
EKG Q-T INTERVAL: 416 MS
EKG Q-T INTERVAL: 424 MS
EKG Q-T INTERVAL: 454 MS
EKG QRS DURATION: 122 MS
EKG QRS DURATION: 130 MS
EKG QRS DURATION: 130 MS
EKG QRS DURATION: 132 MS
EKG QRS DURATION: 134 MS
EKG QRS DURATION: 138 MS
EKG QRS DURATION: 140 MS
EKG QTC CALCULATION (BAZETT): 392 MS
EKG QTC CALCULATION (BAZETT): 410 MS
EKG QTC CALCULATION (BAZETT): 416 MS
EKG QTC CALCULATION (BAZETT): 422 MS
EKG QTC CALCULATION (BAZETT): 425 MS
EKG QTC CALCULATION (BAZETT): 439 MS
EKG QTC CALCULATION (BAZETT): 446 MS
EKG R AXIS: -33 DEGREES
EKG R AXIS: -38 DEGREES
EKG R AXIS: -47 DEGREES
EKG R AXIS: -49 DEGREES
EKG R AXIS: -57 DEGREES
EKG R AXIS: -70 DEGREES
EKG R AXIS: -75 DEGREES
EKG T AXIS: -103 DEGREES
EKG T AXIS: 23 DEGREES
EKG T AXIS: 23 DEGREES
EKG T AXIS: 30 DEGREES
EKG T AXIS: 32 DEGREES
EKG T AXIS: 50 DEGREES
EKG T AXIS: 78 DEGREES
EKG VENTRICULAR RATE: 45 BPM
EKG VENTRICULAR RATE: 58 BPM
EKG VENTRICULAR RATE: 67 BPM
EKG VENTRICULAR RATE: 72 BPM
EKG VENTRICULAR RATE: 78 BPM
EKG VENTRICULAR RATE: 81 BPM
EKG VENTRICULAR RATE: 83 BPM
EOSINOPHIL # BLD: 0.2 %
EOSINOPHIL # BLD: 2.3 %
EOSINOPHIL # BLD: 2.7 %
EOSINOPHIL # BLD: 5.9 %
EOSINOPHIL # BLD: 6.9 %
EOSINOPHILS ABSOLUTE: 0 THOU/MM3 (ref 0–0.4)
EOSINOPHILS ABSOLUTE: 0.2 THOU/MM3 (ref 0–0.4)
EOSINOPHILS ABSOLUTE: 0.2 THOU/MM3 (ref 0–0.4)
EOSINOPHILS ABSOLUTE: 0.5 THOU/MM3 (ref 0–0.4)
EOSINOPHILS ABSOLUTE: 0.6 THOU/MM3 (ref 0–0.4)
ERYTHROCYTE [DISTWIDTH] IN BLOOD BY AUTOMATED COUNT: 12.6 % (ref 11.5–14.5)
ERYTHROCYTE [DISTWIDTH] IN BLOOD BY AUTOMATED COUNT: 12.7 % (ref 11.5–14.5)
ERYTHROCYTE [DISTWIDTH] IN BLOOD BY AUTOMATED COUNT: 12.7 % (ref 11.5–14.5)
ERYTHROCYTE [DISTWIDTH] IN BLOOD BY AUTOMATED COUNT: 12.9 % (ref 11.5–14.5)
ERYTHROCYTE [DISTWIDTH] IN BLOOD BY AUTOMATED COUNT: 13 % (ref 11.5–14.5)
ERYTHROCYTE [DISTWIDTH] IN BLOOD BY AUTOMATED COUNT: 13 % (ref 11.5–14.5)
ERYTHROCYTE [DISTWIDTH] IN BLOOD BY AUTOMATED COUNT: 13.2 % (ref 11.5–14.5)
ERYTHROCYTE [DISTWIDTH] IN BLOOD BY AUTOMATED COUNT: 13.6 % (ref 11.5–14.5)
ERYTHROCYTE [DISTWIDTH] IN BLOOD BY AUTOMATED COUNT: 13.6 % (ref 11.5–14.5)
ERYTHROCYTE [DISTWIDTH] IN BLOOD BY AUTOMATED COUNT: 13.7 % (ref 11.5–14.5)
ERYTHROCYTE [DISTWIDTH] IN BLOOD BY AUTOMATED COUNT: 40.5 FL (ref 35–45)
ERYTHROCYTE [DISTWIDTH] IN BLOOD BY AUTOMATED COUNT: 40.6 FL (ref 35–45)
ERYTHROCYTE [DISTWIDTH] IN BLOOD BY AUTOMATED COUNT: 41.1 FL (ref 35–45)
ERYTHROCYTE [DISTWIDTH] IN BLOOD BY AUTOMATED COUNT: 41.1 FL (ref 35–45)
ERYTHROCYTE [DISTWIDTH] IN BLOOD BY AUTOMATED COUNT: 41.4 FL (ref 35–45)
ERYTHROCYTE [DISTWIDTH] IN BLOOD BY AUTOMATED COUNT: 41.5 FL (ref 35–45)
ERYTHROCYTE [DISTWIDTH] IN BLOOD BY AUTOMATED COUNT: 42.1 FL (ref 35–45)
ERYTHROCYTE [DISTWIDTH] IN BLOOD BY AUTOMATED COUNT: 42.7 FL (ref 35–45)
ERYTHROCYTE [DISTWIDTH] IN BLOOD BY AUTOMATED COUNT: 43.2 FL (ref 35–45)
ERYTHROCYTE [DISTWIDTH] IN BLOOD BY AUTOMATED COUNT: 43.3 FL (ref 35–45)
ERYTHROCYTE [DISTWIDTH] IN BLOOD BY AUTOMATED COUNT: 45.1 FL (ref 35–45)
ERYTHROCYTE [DISTWIDTH] IN BLOOD BY AUTOMATED COUNT: 46.2 FL (ref 35–45)
ERYTHROCYTE [DISTWIDTH] IN BLOOD BY AUTOMATED COUNT: 47.1 FL (ref 35–45)
ERYTHROCYTE [DISTWIDTH] IN BLOOD BY AUTOMATED COUNT: 48 FL (ref 35–45)
FLU A ANTIGEN: NEGATIVE
FLU B ANTIGEN: NEGATIVE
GFR SERPL CREATININE-BSD FRML MDRD: 25 ML/MIN/1.73M2
GFR SERPL CREATININE-BSD FRML MDRD: 28 ML/MIN/1.73M2
GFR SERPL CREATININE-BSD FRML MDRD: 30 ML/MIN/1.73M2
GFR SERPL CREATININE-BSD FRML MDRD: 30 ML/MIN/1.73M2
GFR SERPL CREATININE-BSD FRML MDRD: 32 ML/MIN/1.73M2
GFR SERPL CREATININE-BSD FRML MDRD: 35 ML/MIN/1.73M2
GFR SERPL CREATININE-BSD FRML MDRD: 38 ML/MIN/1.73M2
GLUCOSE BLD-MCNC: 101 MG/DL (ref 70–108)
GLUCOSE BLD-MCNC: 101 MG/DL (ref 70–108)
GLUCOSE BLD-MCNC: 102 MG/DL (ref 70–108)
GLUCOSE BLD-MCNC: 103 MG/DL (ref 70–108)
GLUCOSE BLD-MCNC: 104 MG/DL (ref 70–108)
GLUCOSE BLD-MCNC: 105 MG/DL (ref 70–108)
GLUCOSE BLD-MCNC: 106 MG/DL (ref 70–108)
GLUCOSE BLD-MCNC: 108 MG/DL (ref 70–108)
GLUCOSE BLD-MCNC: 109 MG/DL (ref 70–108)
GLUCOSE BLD-MCNC: 109 MG/DL (ref 70–108)
GLUCOSE BLD-MCNC: 119 MG/DL (ref 70–108)
GLUCOSE BLD-MCNC: 149 MG/DL (ref 70–108)
GLUCOSE BLD-MCNC: 178 MG/DL (ref 70–108)
GLUCOSE BLD-MCNC: 86 MG/DL (ref 70–108)
GLUCOSE BLD-MCNC: 89 MG/DL (ref 70–108)
GLUCOSE BLD-MCNC: 94 MG/DL (ref 70–108)
GLUCOSE BLD-MCNC: 97 MG/DL (ref 70–108)
GLUCOSE BLD-MCNC: 98 MG/DL (ref 70–108)
GRAM STAIN RESULT: ABNORMAL
GRAM STAIN RESULT: NORMAL
HCO3: 42 MMOL/L (ref 23–28)
HCT VFR BLD CALC: 27.9 % (ref 37–47)
HCT VFR BLD CALC: 29.4 % (ref 37–47)
HCT VFR BLD CALC: 29.9 % (ref 37–47)
HCT VFR BLD CALC: 30.8 % (ref 37–47)
HCT VFR BLD CALC: 30.9 % (ref 37–47)
HCT VFR BLD CALC: 31.9 % (ref 37–47)
HCT VFR BLD CALC: 33 % (ref 37–47)
HCT VFR BLD CALC: 33.5 % (ref 37–47)
HCT VFR BLD CALC: 33.6 % (ref 37–47)
HCT VFR BLD CALC: 34.1 % (ref 37–47)
HCT VFR BLD CALC: 34.1 % (ref 37–47)
HCT VFR BLD CALC: 34.4 % (ref 37–47)
HCT VFR BLD CALC: 35.5 % (ref 37–47)
HCT VFR BLD CALC: 35.8 % (ref 37–47)
HCT VFR BLD CALC: 37.8 % (ref 37–47)
HCT VFR BLD CALC: 39.9 % (ref 37–47)
HCT VFR BLD CALC: 40.3 % (ref 37–47)
HCT VFR BLD CALC: 41.7 % (ref 37–47)
HCT VFR BLD CALC: 44.1 % (ref 37–47)
HDLC SERPL-MCNC: 41 MG/DL
HDLC SERPL-MCNC: 51 MG/DL
HEMOCCULT STL QL: POSITIVE
HEMOGLOBIN: 10.1 GM/DL (ref 12–16)
HEMOGLOBIN: 10.4 GM/DL (ref 12–16)
HEMOGLOBIN: 10.6 GM/DL (ref 12–16)
HEMOGLOBIN: 10.9 GM/DL (ref 12–16)
HEMOGLOBIN: 11 GM/DL (ref 12–16)
HEMOGLOBIN: 11.3 GM/DL (ref 12–16)
HEMOGLOBIN: 11.3 GM/DL (ref 12–16)
HEMOGLOBIN: 11.6 GM/DL (ref 12–16)
HEMOGLOBIN: 11.9 GM/DL (ref 12–16)
HEMOGLOBIN: 12 GM/DL (ref 12–16)
HEMOGLOBIN: 13.2 GM/DL (ref 12–16)
HEMOGLOBIN: 13.3 GM/DL (ref 12–16)
HEMOGLOBIN: 13.8 GM/DL (ref 12–16)
HEMOGLOBIN: 14.7 GM/DL (ref 12–16)
HEMOGLOBIN: 9.4 GM/DL (ref 12–16)
HEMOGLOBIN: 9.6 GM/DL (ref 12–16)
HEMOGLOBIN: 9.6 GM/DL (ref 12–16)
HEMOGLOBIN: 9.7 GM/DL (ref 12–16)
HEMOGLOBIN: 9.9 GM/DL (ref 12–16)
IFIO2: 44
IMMATURE GRANS (ABS): 0.01 THOU/MM3 (ref 0–0.07)
IMMATURE GRANS (ABS): 0.02 THOU/MM3 (ref 0–0.07)
IMMATURE GRANS (ABS): 0.03 THOU/MM3 (ref 0–0.07)
IMMATURE GRANULOCYTES: 0.1 %
IMMATURE GRANULOCYTES: 0.3 %
IMMATURE GRANULOCYTES: 0.3 %
IMMATURE GRANULOCYTES: 0.4 %
IMMATURE GRANULOCYTES: 0.4 %
LACTIC ACID: 0.9 MMOL/L (ref 0.5–2.2)
LACTIC ACID: 1.2 MMOL/L (ref 0.5–2.2)
LDL CHOLESTEROL CALCULATED: 111 MG/DL
LDL CHOLESTEROL CALCULATED: 93 MG/DL
LEGIONELLA URINARY AG: NEGATIVE
LIPASE: 20 U/L (ref 5.6–51.3)
LV EF: 55 %
LV EF: 55 %
LVEF MODALITY: NORMAL
LVEF MODALITY: NORMAL
LYMPHOCYTES # BLD: 15.4 %
LYMPHOCYTES # BLD: 17.6 %
LYMPHOCYTES # BLD: 20.5 %
LYMPHOCYTES # BLD: 24.7 %
LYMPHOCYTES # BLD: 7 %
LYMPHOCYTES ABSOLUTE: 0.4 THOU/MM3 (ref 1–4.8)
LYMPHOCYTES ABSOLUTE: 1.1 THOU/MM3 (ref 1–4.8)
LYMPHOCYTES ABSOLUTE: 1.5 THOU/MM3 (ref 1–4.8)
LYMPHOCYTES ABSOLUTE: 1.6 THOU/MM3 (ref 1–4.8)
LYMPHOCYTES ABSOLUTE: 2.2 THOU/MM3 (ref 1–4.8)
MAGNESIUM: 2.2 MG/DL (ref 1.6–2.4)
MAGNESIUM: 2.3 MG/DL (ref 1.6–2.4)
MAGNESIUM: 2.4 MG/DL (ref 1.6–2.4)
MAGNESIUM: 2.6 MG/DL (ref 1.6–2.4)
MAGNESIUM: 2.6 MG/DL (ref 1.6–2.4)
MCH RBC QN AUTO: 28.4 PG (ref 26–33)
MCH RBC QN AUTO: 28.5 PG (ref 26–33)
MCH RBC QN AUTO: 28.6 PG (ref 26–33)
MCH RBC QN AUTO: 28.7 PG (ref 26–33)
MCH RBC QN AUTO: 29.2 PG (ref 26–33)
MCH RBC QN AUTO: 29.3 PG (ref 26–33)
MCH RBC QN AUTO: 29.3 PG (ref 26–33)
MCH RBC QN AUTO: 29.7 PG (ref 26–33)
MCH RBC QN AUTO: 29.7 PG (ref 26–33)
MCH RBC QN AUTO: 29.9 PG (ref 26–33)
MCH RBC QN AUTO: 30.1 PG (ref 26–33)
MCH RBC QN AUTO: 30.1 PG (ref 26–33)
MCHC RBC AUTO-ENTMCNC: 30.6 GM/DL (ref 32.2–35.5)
MCHC RBC AUTO-ENTMCNC: 31.1 GM/DL (ref 32.2–35.5)
MCHC RBC AUTO-ENTMCNC: 31.7 GM/DL (ref 32.2–35.5)
MCHC RBC AUTO-ENTMCNC: 32 GM/DL (ref 32.2–35.5)
MCHC RBC AUTO-ENTMCNC: 32.1 GM/DL (ref 32.2–35.5)
MCHC RBC AUTO-ENTMCNC: 32.7 GM/DL (ref 32.2–35.5)
MCHC RBC AUTO-ENTMCNC: 32.7 GM/DL (ref 32.2–35.5)
MCHC RBC AUTO-ENTMCNC: 32.8 GM/DL (ref 32.2–35.5)
MCHC RBC AUTO-ENTMCNC: 32.8 GM/DL (ref 32.2–35.5)
MCHC RBC AUTO-ENTMCNC: 33.1 GM/DL (ref 32.2–35.5)
MCHC RBC AUTO-ENTMCNC: 33.1 GM/DL (ref 32.2–35.5)
MCHC RBC AUTO-ENTMCNC: 33.2 GM/DL (ref 32.2–35.5)
MCHC RBC AUTO-ENTMCNC: 33.3 GM/DL (ref 32.2–35.5)
MCHC RBC AUTO-ENTMCNC: 33.3 GM/DL (ref 32.2–35.5)
MCV RBC AUTO: 86.1 FL (ref 81–99)
MCV RBC AUTO: 86.5 FL (ref 81–99)
MCV RBC AUTO: 86.8 FL (ref 81–99)
MCV RBC AUTO: 88.2 FL (ref 81–99)
MCV RBC AUTO: 89 FL (ref 81–99)
MCV RBC AUTO: 89.1 FL (ref 81–99)
MCV RBC AUTO: 89.6 FL (ref 81–99)
MCV RBC AUTO: 89.8 FL (ref 81–99)
MCV RBC AUTO: 90.3 FL (ref 81–99)
MCV RBC AUTO: 91.2 FL (ref 81–99)
MCV RBC AUTO: 93.6 FL (ref 81–99)
MCV RBC AUTO: 94.3 FL (ref 81–99)
MCV RBC AUTO: 95.7 FL (ref 81–99)
MCV RBC AUTO: 96.9 FL (ref 81–99)
MONOCYTES # BLD: 0.4 %
MONOCYTES # BLD: 7.7 %
MONOCYTES # BLD: 7.7 %
MONOCYTES # BLD: 7.8 %
MONOCYTES # BLD: 8.6 %
MONOCYTES ABSOLUTE: 0 THOU/MM3 (ref 0.4–1.3)
MONOCYTES ABSOLUTE: 0.6 THOU/MM3 (ref 0.4–1.3)
MONOCYTES ABSOLUTE: 0.6 THOU/MM3 (ref 0.4–1.3)
MONOCYTES ABSOLUTE: 0.7 THOU/MM3 (ref 0.4–1.3)
MONOCYTES ABSOLUTE: 0.7 THOU/MM3 (ref 0.4–1.3)
NUCLEATED RED BLOOD CELLS: 0 /100 WBC
O2 SATURATION: 60 %
ORGANISM: ABNORMAL
OSMOLALITY CALCULATION: 276.5 MOSMOL/KG (ref 275–300)
OSMOLALITY CALCULATION: 277.1 MOSMOL/KG (ref 275–300)
OSMOLALITY CALCULATION: 280.4 MOSMOL/KG (ref 275–300)
OSMOLALITY CALCULATION: 284.9 MOSMOL/KG (ref 275–300)
PCO2: 61 MMHG (ref 35–45)
PH BLOOD GAS: 7.45 (ref 7.35–7.45)
PLATELET # BLD: 165 THOU/MM3 (ref 130–400)
PLATELET # BLD: 186 THOU/MM3 (ref 130–400)
PLATELET # BLD: 188 THOU/MM3 (ref 130–400)
PLATELET # BLD: 188 THOU/MM3 (ref 130–400)
PLATELET # BLD: 190 THOU/MM3 (ref 130–400)
PLATELET # BLD: 197 THOU/MM3 (ref 130–400)
PLATELET # BLD: 207 THOU/MM3 (ref 130–400)
PLATELET # BLD: 212 THOU/MM3 (ref 130–400)
PLATELET # BLD: 245 THOU/MM3 (ref 130–400)
PLATELET # BLD: 260 THOU/MM3 (ref 130–400)
PLATELET # BLD: 264 THOU/MM3 (ref 130–400)
PLATELET # BLD: 265 THOU/MM3 (ref 130–400)
PLATELET # BLD: 279 THOU/MM3 (ref 130–400)
PLATELET # BLD: 292 THOU/MM3 (ref 130–400)
PMV BLD AUTO: 10.1 FL (ref 9.4–12.4)
PMV BLD AUTO: 9.4 FL (ref 9.4–12.4)
PMV BLD AUTO: 9.6 FL (ref 9.4–12.4)
PMV BLD AUTO: 9.7 FL (ref 9.4–12.4)
PMV BLD AUTO: 9.8 FL (ref 9.4–12.4)
PMV BLD AUTO: 9.9 FL (ref 9.4–12.4)
PMV BLD AUTO: 9.9 FL (ref 9.4–12.4)
PO2: 31 MMHG (ref 71–104)
POTASSIUM REFLEX MAGNESIUM: 4.6 MEQ/L (ref 3.5–5.2)
POTASSIUM REFLEX MAGNESIUM: 4.9 MEQ/L (ref 3.5–5.2)
POTASSIUM SERPL-SCNC: 3.6 MEQ/L (ref 3.5–5.2)
POTASSIUM SERPL-SCNC: 3.7 MEQ/L (ref 3.5–5.2)
POTASSIUM SERPL-SCNC: 3.8 MEQ/L (ref 3.5–5.2)
POTASSIUM SERPL-SCNC: 3.9 MEQ/L (ref 3.5–5.2)
POTASSIUM SERPL-SCNC: 3.9 MEQ/L (ref 3.5–5.2)
POTASSIUM SERPL-SCNC: 4 MEQ/L (ref 3.5–5.2)
POTASSIUM SERPL-SCNC: 4 MEQ/L (ref 3.5–5.2)
POTASSIUM SERPL-SCNC: 4.1 MEQ/L (ref 3.5–5.2)
POTASSIUM SERPL-SCNC: 4.2 MEQ/L (ref 3.5–5.2)
POTASSIUM SERPL-SCNC: 4.3 MEQ/L (ref 3.5–5.2)
POTASSIUM SERPL-SCNC: 4.3 MEQ/L (ref 3.5–5.2)
POTASSIUM SERPL-SCNC: 4.4 MEQ/L (ref 3.5–5.2)
POTASSIUM SERPL-SCNC: 4.4 MEQ/L (ref 3.5–5.2)
POTASSIUM SERPL-SCNC: 4.5 MEQ/L (ref 3.5–5.2)
POTASSIUM SERPL-SCNC: 4.5 MEQ/L (ref 3.5–5.2)
POTASSIUM SERPL-SCNC: 4.6 MEQ/L (ref 3.5–5.2)
POTASSIUM SERPL-SCNC: 4.7 MEQ/L (ref 3.5–5.2)
PRO-BNP: 1093 PG/ML (ref 0–1800)
PRO-BNP: 1402 PG/ML (ref 0–1800)
PRO-BNP: 2260 PG/ML (ref 0–1800)
PRO-BNP: 3304 PG/ML (ref 0–1800)
PRO-BNP: 4481 PG/ML (ref 0–1800)
PRO-BNP: 5592 PG/ML (ref 0–1800)
PRO-BNP: 6076 PG/ML (ref 0–1800)
PROCALCITONIN: 0.08 NG/ML (ref 0.01–0.09)
RBC # BLD: 3.19 MILL/MM3 (ref 4.2–5.4)
RBC # BLD: 3.29 MILL/MM3 (ref 4.2–5.4)
RBC # BLD: 3.45 MILL/MM3 (ref 4.2–5.4)
RBC # BLD: 3.81 MILL/MM3 (ref 4.2–5.4)
RBC # BLD: 3.83 MILL/MM3 (ref 4.2–5.4)
RBC # BLD: 3.87 MILL/MM3 (ref 4.2–5.4)
RBC # BLD: 3.94 MILL/MM3 (ref 4.2–5.4)
RBC # BLD: 3.96 MILL/MM3 (ref 4.2–5.4)
RBC # BLD: 4.01 MILL/MM3 (ref 4.2–5.4)
RBC # BLD: 4.16 MILL/MM3 (ref 4.2–5.4)
RBC # BLD: 4.42 MILL/MM3 (ref 4.2–5.4)
RBC # BLD: 4.48 MILL/MM3 (ref 4.2–5.4)
RBC # BLD: 4.73 MILL/MM3 (ref 4.2–5.4)
RBC # BLD: 4.91 MILL/MM3 (ref 4.2–5.4)
REASON FOR REJECTION: NORMAL
REASON FOR REJECTION: NORMAL
REJECTED TEST: NORMAL
REJECTED TEST: NORMAL
RESPIRATORY CULTURE: ABNORMAL
RESPIRATORY CULTURE: NORMAL
RH FACTOR: NORMAL
SEG NEUTROPHILS: 64.2 %
SEG NEUTROPHILS: 65.1 %
SEG NEUTROPHILS: 65.9 %
SEG NEUTROPHILS: 73.9 %
SEG NEUTROPHILS: 91.8 %
SEGMENTED NEUTROPHILS ABSOLUTE COUNT: 4.9 THOU/MM3 (ref 1.8–7.7)
SEGMENTED NEUTROPHILS ABSOLUTE COUNT: 5 THOU/MM3 (ref 1.8–7.7)
SEGMENTED NEUTROPHILS ABSOLUTE COUNT: 5.4 THOU/MM3 (ref 1.8–7.7)
SEGMENTED NEUTROPHILS ABSOLUTE COUNT: 5.6 THOU/MM3 (ref 1.8–7.7)
SEGMENTED NEUTROPHILS ABSOLUTE COUNT: 5.6 THOU/MM3 (ref 1.8–7.7)
SODIUM BLD-SCNC: 122 MEQ/L (ref 135–145)
SODIUM BLD-SCNC: 124 MEQ/L (ref 135–145)
SODIUM BLD-SCNC: 125 MEQ/L (ref 135–145)
SODIUM BLD-SCNC: 125 MEQ/L (ref 135–145)
SODIUM BLD-SCNC: 131 MEQ/L (ref 135–145)
SODIUM BLD-SCNC: 131 MEQ/L (ref 135–145)
SODIUM BLD-SCNC: 134 MEQ/L (ref 135–145)
SODIUM BLD-SCNC: 135 MEQ/L (ref 135–145)
SODIUM BLD-SCNC: 135 MEQ/L (ref 135–145)
SODIUM BLD-SCNC: 136 MEQ/L (ref 135–145)
SODIUM BLD-SCNC: 137 MEQ/L (ref 135–145)
SODIUM BLD-SCNC: 137 MEQ/L (ref 135–145)
SODIUM BLD-SCNC: 138 MEQ/L (ref 135–145)
SODIUM BLD-SCNC: 139 MEQ/L (ref 135–145)
SODIUM BLD-SCNC: 139 MEQ/L (ref 135–145)
SODIUM BLD-SCNC: 140 MEQ/L (ref 135–145)
SODIUM BLD-SCNC: 141 MEQ/L (ref 135–145)
SOURCE, BLOOD GAS: ABNORMAL
STREP PNEUMO AG, UR: NEGATIVE
T4 FREE: 1.06 NG/DL (ref 0.93–1.76)
T4 FREE: 1.53 NG/DL (ref 0.93–1.76)
TOTAL PROTEIN: 6.6 G/DL (ref 6.1–8)
TOTAL PROTEIN: 6.7 G/DL (ref 6.1–8)
TOTAL PROTEIN: 7 G/DL (ref 6.1–8)
TOTAL PROTEIN: 7.4 G/DL (ref 6.1–8)
TOTAL PROTEIN: 7.4 G/DL (ref 6.1–8)
TOTAL PROTEIN: 7.8 G/DL (ref 6.1–8)
TRIGL SERPL-MCNC: 127 MG/DL (ref 0–199)
TRIGL SERPL-MCNC: 93 MG/DL (ref 0–199)
TROPONIN T: 0.06 NG/ML
TROPONIN T: 0.06 NG/ML
TROPONIN T: 0.07 NG/ML
TROPONIN T: 0.11 NG/ML
TROPONIN T: 0.12 NG/ML
TROPONIN T: 0.13 NG/ML
TROPONIN T: 0.3 NG/ML
TROPONIN T: 0.31 NG/ML
TROPONIN T: < 0.01 NG/ML
TSH SERPL DL<=0.05 MIU/L-ACNC: 0.24 UIU/ML (ref 0.4–4.2)
TSH SERPL DL<=0.05 MIU/L-ACNC: 0.3 UIU/ML (ref 0.4–4.2)
TSH SERPL DL<=0.05 MIU/L-ACNC: 10.83 UIU/ML (ref 0.4–4.2)
VITAMIN D 25-HYDROXY: 58 NG/ML (ref 30–100)
WBC # BLD: 10 THOU/MM3 (ref 4.8–10.8)
WBC # BLD: 5.3 THOU/MM3 (ref 4.8–10.8)
WBC # BLD: 6.2 THOU/MM3 (ref 4.8–10.8)
WBC # BLD: 7.2 THOU/MM3 (ref 4.8–10.8)
WBC # BLD: 7.3 THOU/MM3 (ref 4.8–10.8)
WBC # BLD: 7.4 THOU/MM3 (ref 4.8–10.8)
WBC # BLD: 7.5 THOU/MM3 (ref 4.8–10.8)
WBC # BLD: 7.7 THOU/MM3 (ref 4.8–10.8)
WBC # BLD: 7.8 THOU/MM3 (ref 4.8–10.8)
WBC # BLD: 8.2 THOU/MM3 (ref 4.8–10.8)
WBC # BLD: 8.5 THOU/MM3 (ref 4.8–10.8)
WBC # BLD: 8.8 THOU/MM3 (ref 4.8–10.8)
WBC # BLD: 8.9 THOU/MM3 (ref 4.8–10.8)
WBC # BLD: 9.2 THOU/MM3 (ref 4.8–10.8)

## 2019-01-01 PROCEDURE — G0378 HOSPITAL OBSERVATION PER HR: HCPCS

## 2019-01-01 PROCEDURE — 87147 CULTURE TYPE IMMUNOLOGIC: CPT

## 2019-01-01 PROCEDURE — 94640 AIRWAY INHALATION TREATMENT: CPT

## 2019-01-01 PROCEDURE — 6370000000 HC RX 637 (ALT 250 FOR IP): Performed by: NURSE PRACTITIONER

## 2019-01-01 PROCEDURE — 78278 ACUTE GI BLOOD LOSS IMAGING: CPT

## 2019-01-01 PROCEDURE — 85027 COMPLETE CBC AUTOMATED: CPT

## 2019-01-01 PROCEDURE — 6370000000 HC RX 637 (ALT 250 FOR IP): Performed by: INTERNAL MEDICINE

## 2019-01-01 PROCEDURE — 6360000002 HC RX W HCPCS: Performed by: INTERNAL MEDICINE

## 2019-01-01 PROCEDURE — 2580000003 HC RX 258: Performed by: INTERNAL MEDICINE

## 2019-01-01 PROCEDURE — 36415 COLL VENOUS BLD VENIPUNCTURE: CPT

## 2019-01-01 PROCEDURE — 1200000003 HC TELEMETRY R&B

## 2019-01-01 PROCEDURE — 2700000000 HC OXYGEN THERAPY PER DAY

## 2019-01-01 PROCEDURE — 6360000002 HC RX W HCPCS: Performed by: NURSE PRACTITIONER

## 2019-01-01 PROCEDURE — 80048 BASIC METABOLIC PNL TOTAL CA: CPT

## 2019-01-01 PROCEDURE — 99233 SBSQ HOSP IP/OBS HIGH 50: CPT | Performed by: NURSE PRACTITIONER

## 2019-01-01 PROCEDURE — 97535 SELF CARE MNGMENT TRAINING: CPT

## 2019-01-01 PROCEDURE — 99214 OFFICE O/P EST MOD 30 MIN: CPT | Performed by: NURSE PRACTITIONER

## 2019-01-01 PROCEDURE — 2709999900 HC NON-CHARGEABLE SUPPLY

## 2019-01-01 PROCEDURE — 1290000000 HC SEMI PRIVATE OTHER R&B

## 2019-01-01 PROCEDURE — 94760 N-INVAS EAR/PLS OXIMETRY 1: CPT

## 2019-01-01 PROCEDURE — 87449 NOS EACH ORGANISM AG IA: CPT

## 2019-01-01 PROCEDURE — 1200000000 HC SEMI PRIVATE

## 2019-01-01 PROCEDURE — 80076 HEPATIC FUNCTION PANEL: CPT

## 2019-01-01 PROCEDURE — 99284 EMERGENCY DEPT VISIT MOD MDM: CPT

## 2019-01-01 PROCEDURE — 97165 OT EVAL LOW COMPLEX 30 MIN: CPT

## 2019-01-01 PROCEDURE — 2500000003 HC RX 250 WO HCPCS: Performed by: INTERNAL MEDICINE

## 2019-01-01 PROCEDURE — 82948 REAGENT STRIP/BLOOD GLUCOSE: CPT

## 2019-01-01 PROCEDURE — 80053 COMPREHEN METABOLIC PANEL: CPT

## 2019-01-01 PROCEDURE — 93005 ELECTROCARDIOGRAM TRACING: CPT | Performed by: NURSE PRACTITIONER

## 2019-01-01 PROCEDURE — 83880 ASSAY OF NATRIURETIC PEPTIDE: CPT

## 2019-01-01 PROCEDURE — 84484 ASSAY OF TROPONIN QUANT: CPT

## 2019-01-01 PROCEDURE — 71045 X-RAY EXAM CHEST 1 VIEW: CPT

## 2019-01-01 PROCEDURE — 86900 BLOOD TYPING SEROLOGIC ABO: CPT

## 2019-01-01 PROCEDURE — 99233 SBSQ HOSP IP/OBS HIGH 50: CPT | Performed by: INTERNAL MEDICINE

## 2019-01-01 PROCEDURE — A9560 TC99M LABELED RBC: HCPCS | Performed by: INTERNAL MEDICINE

## 2019-01-01 PROCEDURE — 0220000000 HC SKILLED NURSING FACILITY

## 2019-01-01 PROCEDURE — 94761 N-INVAS EAR/PLS OXIMETRY MLT: CPT

## 2019-01-01 PROCEDURE — 97530 THERAPEUTIC ACTIVITIES: CPT

## 2019-01-01 PROCEDURE — 94667 MNPJ CHEST WALL 1ST: CPT

## 2019-01-01 PROCEDURE — 83605 ASSAY OF LACTIC ACID: CPT

## 2019-01-01 PROCEDURE — 86850 RBC ANTIBODY SCREEN: CPT

## 2019-01-01 PROCEDURE — 97116 GAIT TRAINING THERAPY: CPT

## 2019-01-01 PROCEDURE — 97166 OT EVAL MOD COMPLEX 45 MIN: CPT

## 2019-01-01 PROCEDURE — 80061 LIPID PANEL: CPT

## 2019-01-01 PROCEDURE — 99222 1ST HOSP IP/OBS MODERATE 55: CPT | Performed by: INTERNAL MEDICINE

## 2019-01-01 PROCEDURE — 87186 SC STD MICRODIL/AGAR DIL: CPT

## 2019-01-01 PROCEDURE — 87205 SMEAR GRAM STAIN: CPT

## 2019-01-01 PROCEDURE — 85014 HEMATOCRIT: CPT

## 2019-01-01 PROCEDURE — 85025 COMPLETE CBC W/AUTO DIFF WBC: CPT

## 2019-01-01 PROCEDURE — 87077 CULTURE AEROBIC IDENTIFY: CPT

## 2019-01-01 PROCEDURE — 94762 N-INVAS EAR/PLS OXIMTRY CONT: CPT

## 2019-01-01 PROCEDURE — 51798 US URINE CAPACITY MEASURE: CPT

## 2019-01-01 PROCEDURE — 86901 BLOOD TYPING SEROLOGIC RH(D): CPT

## 2019-01-01 PROCEDURE — 84145 PROCALCITONIN (PCT): CPT

## 2019-01-01 PROCEDURE — 84443 ASSAY THYROID STIM HORMONE: CPT

## 2019-01-01 PROCEDURE — 94660 CPAP INITIATION&MGMT: CPT

## 2019-01-01 PROCEDURE — 71275 CT ANGIOGRAPHY CHEST: CPT

## 2019-01-01 PROCEDURE — 70486 CT MAXILLOFACIAL W/O DYE: CPT

## 2019-01-01 PROCEDURE — 97162 PT EVAL MOD COMPLEX 30 MIN: CPT

## 2019-01-01 PROCEDURE — 6360000004 HC RX CONTRAST MEDICATION: Performed by: INTERNAL MEDICINE

## 2019-01-01 PROCEDURE — 92610 EVALUATE SWALLOWING FUNCTION: CPT

## 2019-01-01 PROCEDURE — 87070 CULTURE OTHR SPECIMN AEROBIC: CPT

## 2019-01-01 PROCEDURE — 4040F PNEUMOC VAC/ADMIN/RCVD: CPT | Performed by: NURSE PRACTITIONER

## 2019-01-01 PROCEDURE — 83735 ASSAY OF MAGNESIUM: CPT

## 2019-01-01 PROCEDURE — 82248 BILIRUBIN DIRECT: CPT

## 2019-01-01 PROCEDURE — 36600 WITHDRAWAL OF ARTERIAL BLOOD: CPT

## 2019-01-01 PROCEDURE — 93005 ELECTROCARDIOGRAM TRACING: CPT | Performed by: INTERNAL MEDICINE

## 2019-01-01 PROCEDURE — G8598 ASA/ANTIPLAT THER USED: HCPCS | Performed by: NURSE PRACTITIONER

## 2019-01-01 PROCEDURE — 99285 EMERGENCY DEPT VISIT HI MDM: CPT

## 2019-01-01 PROCEDURE — 99232 SBSQ HOSP IP/OBS MODERATE 35: CPT | Performed by: INTERNAL MEDICINE

## 2019-01-01 PROCEDURE — 85018 HEMOGLOBIN: CPT

## 2019-01-01 PROCEDURE — 93010 ELECTROCARDIOGRAM REPORT: CPT | Performed by: INTERNAL MEDICINE

## 2019-01-01 PROCEDURE — 87899 AGENT NOS ASSAY W/OPTIC: CPT

## 2019-01-01 PROCEDURE — 1123F ACP DISCUSS/DSCN MKR DOCD: CPT | Performed by: NURSE PRACTITIONER

## 2019-01-01 PROCEDURE — 93971 EXTREMITY STUDY: CPT

## 2019-01-01 PROCEDURE — 96375 TX/PRO/DX INJ NEW DRUG ADDON: CPT

## 2019-01-01 PROCEDURE — G8427 DOCREV CUR MEDS BY ELIG CLIN: HCPCS | Performed by: NURSE PRACTITIONER

## 2019-01-01 PROCEDURE — 87804 INFLUENZA ASSAY W/OPTIC: CPT

## 2019-01-01 PROCEDURE — 99232 SBSQ HOSP IP/OBS MODERATE 35: CPT | Performed by: NURSE PRACTITIONER

## 2019-01-01 PROCEDURE — APPSS30 APP SPLIT SHARED TIME 16-30 MINUTES: Performed by: NURSE PRACTITIONER

## 2019-01-01 PROCEDURE — G8417 CALC BMI ABV UP PARAM F/U: HCPCS | Performed by: NURSE PRACTITIONER

## 2019-01-01 PROCEDURE — 87040 BLOOD CULTURE FOR BACTERIA: CPT

## 2019-01-01 PROCEDURE — 82803 BLOOD GASES ANY COMBINATION: CPT

## 2019-01-01 PROCEDURE — 3430000000 HC RX DIAGNOSTIC RADIOPHARMACEUTICAL: Performed by: INTERNAL MEDICINE

## 2019-01-01 PROCEDURE — 93010 ELECTROCARDIOGRAM REPORT: CPT | Performed by: NUCLEAR MEDICINE

## 2019-01-01 PROCEDURE — 93306 TTE W/DOPPLER COMPLETE: CPT

## 2019-01-01 PROCEDURE — 82272 OCCULT BLD FECES 1-3 TESTS: CPT

## 2019-01-01 PROCEDURE — 96376 TX/PRO/DX INJ SAME DRUG ADON: CPT

## 2019-01-01 PROCEDURE — 97167 OT EVAL HIGH COMPLEX 60 MIN: CPT

## 2019-01-01 PROCEDURE — 96374 THER/PROPH/DIAG INJ IV PUSH: CPT

## 2019-01-01 PROCEDURE — 99239 HOSP IP/OBS DSCHRG MGMT >30: CPT | Performed by: NURSE PRACTITIONER

## 2019-01-01 PROCEDURE — 84439 ASSAY OF FREE THYROXINE: CPT

## 2019-01-01 PROCEDURE — 71046 X-RAY EXAM CHEST 2 VIEWS: CPT

## 2019-01-01 PROCEDURE — 94668 MNPJ CHEST WALL SBSQ: CPT

## 2019-01-01 PROCEDURE — 97161 PT EVAL LOW COMPLEX 20 MIN: CPT

## 2019-01-01 PROCEDURE — 86870 RBC ANTIBODY IDENTIFICATION: CPT

## 2019-01-01 PROCEDURE — 94618 PULMONARY STRESS TESTING: CPT | Performed by: NURSE PRACTITIONER

## 2019-01-01 PROCEDURE — 99223 1ST HOSP IP/OBS HIGH 75: CPT | Performed by: INTERNAL MEDICINE

## 2019-01-01 PROCEDURE — 71250 CT THORAX DX C-: CPT

## 2019-01-01 PROCEDURE — 82306 VITAMIN D 25 HYDROXY: CPT

## 2019-01-01 PROCEDURE — 1090F PRES/ABSN URINE INCON ASSESS: CPT | Performed by: NURSE PRACTITIONER

## 2019-01-01 PROCEDURE — 6370000000 HC RX 637 (ALT 250 FOR IP): Performed by: PHYSICAL MEDICINE & REHABILITATION

## 2019-01-01 PROCEDURE — 1036F TOBACCO NON-USER: CPT | Performed by: NURSE PRACTITIONER

## 2019-01-01 PROCEDURE — 83690 ASSAY OF LIPASE: CPT

## 2019-01-01 PROCEDURE — 94669 MECHANICAL CHEST WALL OSCILL: CPT

## 2019-01-01 PROCEDURE — 93005 ELECTROCARDIOGRAM TRACING: CPT | Performed by: EMERGENCY MEDICINE

## 2019-01-01 RX ORDER — LEVOFLOXACIN 500 MG/1
500 TABLET, FILM COATED ORAL DAILY
Status: DISCONTINUED | OUTPATIENT
Start: 2019-01-01 | End: 2019-01-01

## 2019-01-01 RX ORDER — LEVOFLOXACIN 750 MG/1
750 TABLET ORAL EVERY OTHER DAY
Status: DISCONTINUED | OUTPATIENT
Start: 2019-01-01 | End: 2019-01-01

## 2019-01-01 RX ORDER — POLYETHYLENE GLYCOL 3350 17 G/17G
17 POWDER, FOR SOLUTION ORAL DAILY
Status: DISCONTINUED | OUTPATIENT
Start: 2019-01-01 | End: 2019-01-01 | Stop reason: HOSPADM

## 2019-01-01 RX ORDER — BUMETANIDE 1 MG/1
1 TABLET ORAL DAILY
Status: DISCONTINUED | OUTPATIENT
Start: 2019-01-01 | End: 2019-01-01 | Stop reason: HOSPADM

## 2019-01-01 RX ORDER — FUROSEMIDE 10 MG/ML
20 INJECTION INTRAMUSCULAR; INTRAVENOUS ONCE
Status: DISCONTINUED | OUTPATIENT
Start: 2019-01-01 | End: 2019-01-01

## 2019-01-01 RX ORDER — ONDANSETRON 2 MG/ML
4 INJECTION INTRAMUSCULAR; INTRAVENOUS EVERY 6 HOURS PRN
Status: DISCONTINUED | OUTPATIENT
Start: 2019-01-01 | End: 2019-01-01

## 2019-01-01 RX ORDER — AMLODIPINE BESYLATE 5 MG/1
5 TABLET ORAL DAILY
Status: DISCONTINUED | OUTPATIENT
Start: 2019-01-01 | End: 2019-01-01 | Stop reason: HOSPADM

## 2019-01-01 RX ORDER — SODIUM CHLORIDE 1000 MG
2 TABLET, SOLUBLE MISCELLANEOUS
Status: DISCONTINUED | OUTPATIENT
Start: 2019-01-01 | End: 2019-01-01

## 2019-01-01 RX ORDER — SODIUM CHLORIDE 0.9 % (FLUSH) 0.9 %
10 SYRINGE (ML) INJECTION EVERY 12 HOURS SCHEDULED
Status: DISCONTINUED | OUTPATIENT
Start: 2019-01-01 | End: 2019-01-01 | Stop reason: HOSPADM

## 2019-01-01 RX ORDER — MULTIVITAMIN/IRON/FOLIC ACID 18MG-0.4MG
500 TABLET ORAL DAILY
Status: DISCONTINUED | OUTPATIENT
Start: 2019-01-01 | End: 2019-01-01 | Stop reason: HOSPADM

## 2019-01-01 RX ORDER — ALBUTEROL SULFATE 90 UG/1
2 AEROSOL, METERED RESPIRATORY (INHALATION) EVERY 6 HOURS PRN
Status: CANCELLED | OUTPATIENT
Start: 2019-01-01

## 2019-01-01 RX ORDER — PANTOPRAZOLE SODIUM 40 MG/1
40 TABLET, DELAYED RELEASE ORAL
Status: DISCONTINUED | OUTPATIENT
Start: 2019-01-01 | End: 2019-01-01 | Stop reason: HOSPADM

## 2019-01-01 RX ORDER — VITAMIN B COMPLEX
1 TABLET ORAL DAILY
Status: CANCELLED | OUTPATIENT
Start: 2019-01-01

## 2019-01-01 RX ORDER — GUAIFENESIN/DEXTROMETHORPHAN 100-10MG/5
15 SYRUP ORAL EVERY 4 HOURS PRN
Status: CANCELLED | OUTPATIENT
Start: 2019-01-01

## 2019-01-01 RX ORDER — SODIUM CHLORIDE 0.9 % (FLUSH) 0.9 %
10 SYRINGE (ML) INJECTION PRN
Status: DISCONTINUED | OUTPATIENT
Start: 2019-01-01 | End: 2019-01-01 | Stop reason: HOSPADM

## 2019-01-01 RX ORDER — ONDANSETRON 4 MG/1
4 TABLET, FILM COATED ORAL EVERY 8 HOURS PRN
Status: DISCONTINUED | OUTPATIENT
Start: 2019-01-01 | End: 2019-12-04 | Stop reason: HOSPADM

## 2019-01-01 RX ORDER — ALBUTEROL SULFATE 2.5 MG/3ML
2.5 SOLUTION RESPIRATORY (INHALATION) EVERY 6 HOURS PRN
Status: DISCONTINUED | OUTPATIENT
Start: 2019-01-01 | End: 2019-01-01 | Stop reason: HOSPADM

## 2019-01-01 RX ORDER — GUAIFENESIN/DEXTROMETHORPHAN 100-10MG/5
10 SYRUP ORAL EVERY 4 HOURS PRN
Qty: 120 ML | COMMUNITY
Start: 2019-01-01 | End: 2019-01-01

## 2019-01-01 RX ORDER — HEPARIN SODIUM 5000 [USP'U]/ML
5000 INJECTION, SOLUTION INTRAVENOUS; SUBCUTANEOUS EVERY 8 HOURS SCHEDULED
Status: DISCONTINUED | OUTPATIENT
Start: 2019-01-01 | End: 2019-01-01

## 2019-01-01 RX ORDER — CALCIUM CARBONATE 200(500)MG
500 TABLET,CHEWABLE ORAL 3 TIMES DAILY PRN
Status: DISCONTINUED | OUTPATIENT
Start: 2019-01-01 | End: 2019-01-01 | Stop reason: HOSPADM

## 2019-01-01 RX ORDER — LEVOTHYROXINE SODIUM 88 UG/1
88 TABLET ORAL DAILY
Status: CANCELLED | OUTPATIENT
Start: 2019-01-01

## 2019-01-01 RX ORDER — DOXYCYCLINE HYCLATE 100 MG
100 TABLET ORAL EVERY 12 HOURS SCHEDULED
Status: DISCONTINUED | OUTPATIENT
Start: 2019-01-01 | End: 2019-01-01 | Stop reason: HOSPADM

## 2019-01-01 RX ORDER — NITROGLYCERIN 0.4 MG/1
0.4 TABLET SUBLINGUAL EVERY 5 MIN PRN
Status: CANCELLED | OUTPATIENT
Start: 2019-01-01

## 2019-01-01 RX ORDER — ALBUTEROL SULFATE 2.5 MG/3ML
2.5 SOLUTION RESPIRATORY (INHALATION) 4 TIMES DAILY
Status: CANCELLED | OUTPATIENT
Start: 2019-01-01

## 2019-01-01 RX ORDER — MULTIVITAMIN/IRON/FOLIC ACID 18MG-0.4MG
500 TABLET ORAL DAILY
Status: DISCONTINUED | OUTPATIENT
Start: 2019-01-01 | End: 2019-12-04 | Stop reason: HOSPADM

## 2019-01-01 RX ORDER — POTASSIUM CHLORIDE 20 MEQ/1
10 TABLET, EXTENDED RELEASE ORAL DAILY
Status: CANCELLED | OUTPATIENT
Start: 2019-01-01

## 2019-01-01 RX ORDER — ACETAMINOPHEN 325 MG/1
650 TABLET ORAL EVERY 4 HOURS PRN
Status: DISCONTINUED | OUTPATIENT
Start: 2019-01-01 | End: 2019-01-01 | Stop reason: HOSPADM

## 2019-01-01 RX ORDER — ATORVASTATIN CALCIUM 40 MG/1
40 TABLET, FILM COATED ORAL NIGHTLY
Status: DISCONTINUED | OUTPATIENT
Start: 2019-01-01 | End: 2019-01-01

## 2019-01-01 RX ORDER — BENZONATATE 100 MG/1
100 CAPSULE ORAL 3 TIMES DAILY PRN
Qty: 60 CAPSULE | Refills: 1 | Status: SHIPPED | OUTPATIENT
Start: 2019-01-01 | End: 2019-01-01

## 2019-01-01 RX ORDER — CALCIUM CARBONATE 200(500)MG
500 TABLET,CHEWABLE ORAL 3 TIMES DAILY PRN
Status: DISCONTINUED | OUTPATIENT
Start: 2019-01-01 | End: 2019-12-04 | Stop reason: HOSPADM

## 2019-01-01 RX ORDER — AMLODIPINE BESYLATE 5 MG/1
5 TABLET ORAL DAILY
Qty: 30 TABLET | Refills: 3 | Status: SHIPPED | OUTPATIENT
Start: 2019-01-01

## 2019-01-01 RX ORDER — DOCUSATE SODIUM 100 MG/1
100 CAPSULE, LIQUID FILLED ORAL DAILY
Status: DISCONTINUED | OUTPATIENT
Start: 2019-01-01 | End: 2019-01-01 | Stop reason: HOSPADM

## 2019-01-01 RX ORDER — POLYETHYLENE GLYCOL 3350 17 G/17G
17 POWDER, FOR SOLUTION ORAL DAILY
Status: CANCELLED | OUTPATIENT
Start: 2019-01-01

## 2019-01-01 RX ORDER — PANTOPRAZOLE SODIUM 40 MG/1
40 TABLET, DELAYED RELEASE ORAL
Status: DISCONTINUED | OUTPATIENT
Start: 2019-01-01 | End: 2019-01-01

## 2019-01-01 RX ORDER — ISOSORBIDE MONONITRATE 30 MG/1
30 TABLET, EXTENDED RELEASE ORAL DAILY
COMMUNITY

## 2019-01-01 RX ORDER — CLOPIDOGREL BISULFATE 75 MG/1
75 TABLET ORAL DAILY
Qty: 30 TABLET | Refills: 3 | Status: SHIPPED | OUTPATIENT
Start: 2019-01-01

## 2019-01-01 RX ORDER — CALCIUM CARBONATE 200(500)MG
500 TABLET,CHEWABLE ORAL 3 TIMES DAILY PRN
Status: CANCELLED | OUTPATIENT
Start: 2019-01-01

## 2019-01-01 RX ORDER — ALBUTEROL SULFATE 2.5 MG/3ML
2.5 SOLUTION RESPIRATORY (INHALATION) 4 TIMES DAILY
Status: DISCONTINUED | OUTPATIENT
Start: 2019-01-01 | End: 2019-01-01 | Stop reason: HOSPADM

## 2019-01-01 RX ORDER — SUCRALFATE 1 G/1
1 TABLET ORAL
Qty: 120 TABLET | Refills: 0 | Status: SHIPPED | OUTPATIENT
Start: 2019-01-01

## 2019-01-01 RX ORDER — ALPRAZOLAM 0.25 MG/1
0.25 TABLET ORAL 3 TIMES DAILY PRN
Status: CANCELLED | OUTPATIENT
Start: 2019-01-01

## 2019-01-01 RX ORDER — LEVOTHYROXINE SODIUM 0.1 MG/1
100 TABLET ORAL DAILY
Status: DISCONTINUED | OUTPATIENT
Start: 2019-01-01 | End: 2019-01-01 | Stop reason: HOSPADM

## 2019-01-01 RX ORDER — ASPIRIN 81 MG/1
81 TABLET, CHEWABLE ORAL DAILY
Status: DISCONTINUED | OUTPATIENT
Start: 2019-01-01 | End: 2019-01-01 | Stop reason: HOSPADM

## 2019-01-01 RX ORDER — GUAIFENESIN/DEXTROMETHORPHAN 100-10MG/5
15 SYRUP ORAL EVERY 4 HOURS PRN
Status: DISCONTINUED | OUTPATIENT
Start: 2019-01-01 | End: 2019-12-04 | Stop reason: HOSPADM

## 2019-01-01 RX ORDER — FUROSEMIDE 10 MG/ML
20 INJECTION INTRAMUSCULAR; INTRAVENOUS EVERY 8 HOURS
Status: DISCONTINUED | OUTPATIENT
Start: 2019-01-01 | End: 2019-01-01

## 2019-01-01 RX ORDER — NITROGLYCERIN 0.4 MG/1
0.4 TABLET SUBLINGUAL EVERY 5 MIN PRN
Status: DISCONTINUED | OUTPATIENT
Start: 2019-01-01 | End: 2019-01-01 | Stop reason: SDUPTHER

## 2019-01-01 RX ORDER — POTASSIUM CHLORIDE 7.45 MG/ML
10 INJECTION INTRAVENOUS PRN
Status: DISCONTINUED | OUTPATIENT
Start: 2019-01-01 | End: 2019-01-01 | Stop reason: HOSPADM

## 2019-01-01 RX ORDER — ALPRAZOLAM 0.25 MG/1
0.25 TABLET ORAL 3 TIMES DAILY PRN
Status: DISCONTINUED | OUTPATIENT
Start: 2019-01-01 | End: 2019-01-01

## 2019-01-01 RX ORDER — POTASSIUM CHLORIDE 20 MEQ/1
10 TABLET, EXTENDED RELEASE ORAL EVERY 8 HOURS
Status: DISCONTINUED | OUTPATIENT
Start: 2019-01-01 | End: 2019-01-01

## 2019-01-01 RX ORDER — NITROGLYCERIN 0.4 MG/1
0.4 TABLET SUBLINGUAL EVERY 5 MIN PRN
Status: DISCONTINUED | OUTPATIENT
Start: 2019-01-01 | End: 2019-01-01 | Stop reason: HOSPADM

## 2019-01-01 RX ORDER — DOCUSATE SODIUM 100 MG/1
100 CAPSULE, LIQUID FILLED ORAL DAILY
Status: CANCELLED | OUTPATIENT
Start: 2019-01-01

## 2019-01-01 RX ORDER — AMLODIPINE BESYLATE 5 MG/1
5 TABLET ORAL DAILY
Status: DISCONTINUED | OUTPATIENT
Start: 2019-01-01 | End: 2019-12-04 | Stop reason: HOSPADM

## 2019-01-01 RX ORDER — VITAMIN B COMPLEX
1000 TABLET ORAL DAILY
Status: DISCONTINUED | OUTPATIENT
Start: 2019-01-01 | End: 2019-01-01 | Stop reason: HOSPADM

## 2019-01-01 RX ORDER — NITROGLYCERIN 0.4 MG/1
0.4 TABLET SUBLINGUAL EVERY 5 MIN PRN
Status: DISCONTINUED | OUTPATIENT
Start: 2019-01-01 | End: 2019-12-04 | Stop reason: HOSPADM

## 2019-01-01 RX ORDER — ISOSORBIDE MONONITRATE 30 MG/1
30 TABLET, EXTENDED RELEASE ORAL DAILY
Status: DISCONTINUED | OUTPATIENT
Start: 2019-01-01 | End: 2019-01-01

## 2019-01-01 RX ORDER — SUCRALFATE 1 G/1
1 TABLET ORAL
Qty: 120 TABLET | Refills: 3 | Status: SHIPPED | OUTPATIENT
Start: 2019-01-01 | End: 2019-01-01

## 2019-01-01 RX ORDER — ASPIRIN 81 MG/1
81 TABLET, CHEWABLE ORAL DAILY
Qty: 30 TABLET | Refills: 3 | Status: ON HOLD | OUTPATIENT
Start: 2019-01-01 | End: 2019-01-01 | Stop reason: ALTCHOICE

## 2019-01-01 RX ORDER — ATORVASTATIN CALCIUM 10 MG/1
10 TABLET, FILM COATED ORAL NIGHTLY
Status: DISCONTINUED | OUTPATIENT
Start: 2019-01-01 | End: 2019-01-01 | Stop reason: HOSPADM

## 2019-01-01 RX ORDER — CLOPIDOGREL BISULFATE 75 MG/1
75 TABLET ORAL DAILY
Status: DISCONTINUED | OUTPATIENT
Start: 2019-01-01 | End: 2019-12-04 | Stop reason: HOSPADM

## 2019-01-01 RX ORDER — RANOLAZINE 500 MG/1
500 TABLET, EXTENDED RELEASE ORAL 2 TIMES DAILY
Status: DISCONTINUED | OUTPATIENT
Start: 2019-01-01 | End: 2019-01-01 | Stop reason: HOSPADM

## 2019-01-01 RX ORDER — POTASSIUM CHLORIDE 750 MG/1
10 TABLET, EXTENDED RELEASE ORAL DAILY
Qty: 30 TABLET | Refills: 3 | Status: SHIPPED | OUTPATIENT
Start: 2019-01-01

## 2019-01-01 RX ORDER — ISOSORBIDE MONONITRATE 30 MG/1
30 TABLET, EXTENDED RELEASE ORAL EVERY 12 HOURS SCHEDULED
Status: DISCONTINUED | OUTPATIENT
Start: 2019-01-01 | End: 2019-01-01 | Stop reason: HOSPADM

## 2019-01-01 RX ORDER — SUCRALFATE 1 G/1
1 TABLET ORAL
Status: DISCONTINUED | OUTPATIENT
Start: 2019-01-01 | End: 2019-01-01 | Stop reason: HOSPADM

## 2019-01-01 RX ORDER — DOXYCYCLINE HYCLATE 100 MG
100 TABLET ORAL ONCE
Status: COMPLETED | OUTPATIENT
Start: 2019-01-01 | End: 2019-01-01

## 2019-01-01 RX ORDER — SUCRALFATE 1 G/1
1 TABLET ORAL EVERY 6 HOURS SCHEDULED
Status: DISCONTINUED | OUTPATIENT
Start: 2019-01-01 | End: 2019-01-01 | Stop reason: HOSPADM

## 2019-01-01 RX ORDER — FLUTICASONE PROPIONATE 50 MCG
2 SPRAY, SUSPENSION (ML) NASAL DAILY
Status: DISCONTINUED | OUTPATIENT
Start: 2019-01-01 | End: 2019-01-01 | Stop reason: HOSPADM

## 2019-01-01 RX ORDER — POTASSIUM CHLORIDE 750 MG/1
10 TABLET, FILM COATED, EXTENDED RELEASE ORAL DAILY
Status: DISCONTINUED | OUTPATIENT
Start: 2019-01-01 | End: 2019-01-01 | Stop reason: HOSPADM

## 2019-01-01 RX ORDER — IBUPROFEN 200 MG
TABLET ORAL 2 TIMES DAILY
Status: DISCONTINUED | OUTPATIENT
Start: 2019-01-01 | End: 2019-01-01 | Stop reason: HOSPADM

## 2019-01-01 RX ORDER — CLOPIDOGREL BISULFATE 75 MG/1
75 TABLET ORAL DAILY
Status: DISCONTINUED | OUTPATIENT
Start: 2019-01-01 | End: 2019-01-01 | Stop reason: HOSPADM

## 2019-01-01 RX ORDER — POTASSIUM CHLORIDE 750 MG/1
10 TABLET, FILM COATED, EXTENDED RELEASE ORAL DAILY
Status: DISCONTINUED | OUTPATIENT
Start: 2019-01-01 | End: 2019-01-01

## 2019-01-01 RX ORDER — PANTOPRAZOLE SODIUM 40 MG/1
40 TABLET, DELAYED RELEASE ORAL
Qty: 30 TABLET | Refills: 3 | Status: SHIPPED | OUTPATIENT
Start: 2019-01-01

## 2019-01-01 RX ORDER — SUCRALFATE 1 G/1
1 TABLET ORAL ONCE
Status: COMPLETED | OUTPATIENT
Start: 2019-01-01 | End: 2019-01-01

## 2019-01-01 RX ORDER — BENZONATATE 100 MG/1
100 CAPSULE ORAL 3 TIMES DAILY PRN
Status: DISCONTINUED | OUTPATIENT
Start: 2019-01-01 | End: 2019-01-01 | Stop reason: HOSPADM

## 2019-01-01 RX ORDER — PANTOPRAZOLE SODIUM 40 MG/1
40 TABLET, DELAYED RELEASE ORAL
Status: DISCONTINUED | OUTPATIENT
Start: 2019-01-01 | End: 2019-12-04 | Stop reason: HOSPADM

## 2019-01-01 RX ORDER — DOXYCYCLINE HYCLATE 100 MG
100 TABLET ORAL EVERY 12 HOURS SCHEDULED
Qty: 14 TABLET | Refills: 0 | Status: SHIPPED | OUTPATIENT
Start: 2019-01-01 | End: 2019-01-01

## 2019-01-01 RX ORDER — ISOSORBIDE MONONITRATE 30 MG/1
30 TABLET, EXTENDED RELEASE ORAL EVERY 12 HOURS SCHEDULED
Qty: 30 TABLET | Refills: 3 | Status: ON HOLD | OUTPATIENT
Start: 2019-01-01 | End: 2019-01-01

## 2019-01-01 RX ORDER — ALPRAZOLAM 0.5 MG/1
0.5 TABLET ORAL NIGHTLY PRN
COMMUNITY

## 2019-01-01 RX ORDER — ALPRAZOLAM 0.25 MG/1
0.25 TABLET ORAL 3 TIMES DAILY PRN
Status: DISCONTINUED | OUTPATIENT
Start: 2019-01-01 | End: 2019-01-01 | Stop reason: HOSPADM

## 2019-01-01 RX ORDER — BUMETANIDE 1 MG/1
2 TABLET ORAL 2 TIMES DAILY
Status: DISCONTINUED | OUTPATIENT
Start: 2019-01-01 | End: 2019-12-04 | Stop reason: HOSPADM

## 2019-01-01 RX ORDER — AMLODIPINE BESYLATE 5 MG/1
5 TABLET ORAL DAILY
Status: CANCELLED | OUTPATIENT
Start: 2019-01-01

## 2019-01-01 RX ORDER — ALBUTEROL SULFATE 2.5 MG/3ML
2.5 SOLUTION RESPIRATORY (INHALATION) 4 TIMES DAILY
Qty: 120 EACH | Refills: 0 | Status: SHIPPED | OUTPATIENT
Start: 2019-01-01

## 2019-01-01 RX ORDER — BUMETANIDE 1 MG/1
1 TABLET ORAL DAILY
Status: CANCELLED | OUTPATIENT
Start: 2019-01-01

## 2019-01-01 RX ORDER — DIPHENHYDRAMINE HCL 25 MG
50 TABLET ORAL ONCE
Status: DISCONTINUED | OUTPATIENT
Start: 2019-01-01 | End: 2019-01-01 | Stop reason: HOSPADM

## 2019-01-01 RX ORDER — DOXYCYCLINE HYCLATE 100 MG
100 TABLET ORAL EVERY 12 HOURS SCHEDULED
Qty: 8 TABLET | Refills: 0 | Status: SHIPPED | OUTPATIENT
Start: 2019-01-01 | End: 2019-01-01

## 2019-01-01 RX ORDER — ALBUTEROL SULFATE 2.5 MG/3ML
2.5 SOLUTION RESPIRATORY (INHALATION)
Status: DISCONTINUED | OUTPATIENT
Start: 2019-01-01 | End: 2019-01-01 | Stop reason: HOSPADM

## 2019-01-01 RX ORDER — ISOSORBIDE MONONITRATE 30 MG/1
30 TABLET, EXTENDED RELEASE ORAL EVERY 12 HOURS SCHEDULED
Status: CANCELLED | OUTPATIENT
Start: 2019-01-01

## 2019-01-01 RX ORDER — ASPIRIN 81 MG/1
81 TABLET, CHEWABLE ORAL DAILY
Status: DISCONTINUED | OUTPATIENT
Start: 2019-01-01 | End: 2019-01-01 | Stop reason: CLARIF

## 2019-01-01 RX ORDER — POLYETHYLENE GLYCOL 3350 17 G/17G
17 POWDER, FOR SOLUTION ORAL DAILY
Status: DISCONTINUED | OUTPATIENT
Start: 2019-01-01 | End: 2019-12-04 | Stop reason: HOSPADM

## 2019-01-01 RX ORDER — ALBUTEROL SULFATE 90 UG/1
2 AEROSOL, METERED RESPIRATORY (INHALATION) EVERY 6 HOURS PRN
Status: DISCONTINUED | OUTPATIENT
Start: 2019-01-01 | End: 2019-12-04 | Stop reason: HOSPADM

## 2019-01-01 RX ORDER — SODIUM CHLORIDE 1000 MG
2 TABLET, SOLUBLE MISCELLANEOUS
Status: DISCONTINUED | OUTPATIENT
Start: 2019-12-04 | End: 2019-12-04 | Stop reason: HOSPADM

## 2019-01-01 RX ORDER — PNV NO.95/FERROUS FUM/FOLIC AC 28MG-0.8MG
500 TABLET ORAL DAILY
Status: DISCONTINUED | OUTPATIENT
Start: 2019-01-01 | End: 2019-01-01 | Stop reason: SDUPTHER

## 2019-01-01 RX ORDER — ISOSORBIDE MONONITRATE 30 MG/1
30 TABLET, EXTENDED RELEASE ORAL DAILY
Qty: 30 TABLET | Refills: 3 | Status: ON HOLD | OUTPATIENT
Start: 2019-01-01 | End: 2019-01-01 | Stop reason: HOSPADM

## 2019-01-01 RX ORDER — LEVOTHYROXINE SODIUM 88 UG/1
88 TABLET ORAL DAILY
Status: DISCONTINUED | OUTPATIENT
Start: 2019-01-01 | End: 2019-01-01 | Stop reason: HOSPADM

## 2019-01-01 RX ORDER — PANTOPRAZOLE SODIUM 40 MG/1
40 TABLET, DELAYED RELEASE ORAL
Status: CANCELLED | OUTPATIENT
Start: 2019-01-01

## 2019-01-01 RX ORDER — PANTOPRAZOLE SODIUM 40 MG/1
40 TABLET, DELAYED RELEASE ORAL ONCE
Status: COMPLETED | OUTPATIENT
Start: 2019-01-01 | End: 2019-01-01

## 2019-01-01 RX ORDER — THIAMINE HCL 100 MG
1 TABLET ORAL DAILY
COMMUNITY

## 2019-01-01 RX ORDER — SUCRALFATE 1 G/1
1 TABLET ORAL
Status: DISCONTINUED | OUTPATIENT
Start: 2019-01-01 | End: 2019-12-04 | Stop reason: HOSPADM

## 2019-01-01 RX ORDER — ALPRAZOLAM 0.25 MG/1
0.25 TABLET ORAL 2 TIMES DAILY PRN
Status: DISCONTINUED | OUTPATIENT
Start: 2019-01-01 | End: 2019-12-04 | Stop reason: HOSPADM

## 2019-01-01 RX ORDER — ALBUTEROL SULFATE 90 UG/1
2 AEROSOL, METERED RESPIRATORY (INHALATION) EVERY 6 HOURS PRN
Status: DISCONTINUED | OUTPATIENT
Start: 2019-01-01 | End: 2019-01-01 | Stop reason: HOSPADM

## 2019-01-01 RX ORDER — ISOSORBIDE MONONITRATE 60 MG/1
60 TABLET, EXTENDED RELEASE ORAL DAILY
Status: DISCONTINUED | OUTPATIENT
Start: 2019-01-01 | End: 2019-01-01 | Stop reason: HOSPADM

## 2019-01-01 RX ORDER — POTASSIUM CHLORIDE 20 MEQ/1
10 TABLET, EXTENDED RELEASE ORAL 2 TIMES DAILY
Status: CANCELLED | OUTPATIENT
Start: 2019-01-01

## 2019-01-01 RX ORDER — POTASSIUM CHLORIDE 20 MEQ/1
10 TABLET, EXTENDED RELEASE ORAL 2 TIMES DAILY
Status: DISCONTINUED | OUTPATIENT
Start: 2019-01-01 | End: 2019-01-01 | Stop reason: HOSPADM

## 2019-01-01 RX ORDER — SENNA PLUS 8.6 MG/1
15 TABLET ORAL ONCE
Status: COMPLETED | OUTPATIENT
Start: 2019-01-01 | End: 2019-01-01

## 2019-01-01 RX ORDER — PNV NO.95/FERROUS FUM/FOLIC AC 28MG-0.8MG
500 TABLET ORAL DAILY
Status: DISCONTINUED | OUTPATIENT
Start: 2019-01-01 | End: 2019-01-01 | Stop reason: HOSPADM

## 2019-01-01 RX ORDER — DOXYCYCLINE HYCLATE 100 MG
100 TABLET ORAL EVERY 12 HOURS SCHEDULED
Status: DISCONTINUED | OUTPATIENT
Start: 2019-01-01 | End: 2019-01-01

## 2019-01-01 RX ORDER — RANOLAZINE 500 MG/1
500 TABLET, EXTENDED RELEASE ORAL 2 TIMES DAILY
Status: DISCONTINUED | OUTPATIENT
Start: 2019-01-01 | End: 2019-12-04 | Stop reason: HOSPADM

## 2019-01-01 RX ORDER — ISOSORBIDE MONONITRATE 60 MG/1
60 TABLET, EXTENDED RELEASE ORAL DAILY
Status: CANCELLED | OUTPATIENT
Start: 2019-01-01

## 2019-01-01 RX ORDER — BENZONATATE 100 MG/1
100 CAPSULE ORAL 3 TIMES DAILY PRN
Status: DISCONTINUED | OUTPATIENT
Start: 2019-01-01 | End: 2019-12-04 | Stop reason: HOSPADM

## 2019-01-01 RX ORDER — IPRATROPIUM BROMIDE AND ALBUTEROL SULFATE 2.5; .5 MG/3ML; MG/3ML
1 SOLUTION RESPIRATORY (INHALATION)
Status: DISCONTINUED | OUTPATIENT
Start: 2019-01-01 | End: 2019-01-01 | Stop reason: HOSPADM

## 2019-01-01 RX ORDER — LEVOTHYROXINE SODIUM 0.1 MG/1
100 TABLET ORAL DAILY
Status: DISCONTINUED | OUTPATIENT
Start: 2019-01-01 | End: 2019-01-01

## 2019-01-01 RX ORDER — POLYETHYLENE GLYCOL 3350 17 G/17G
238 POWDER, FOR SOLUTION ORAL ONCE
Status: COMPLETED | OUTPATIENT
Start: 2019-01-01 | End: 2019-01-01

## 2019-01-01 RX ORDER — THIAMINE HCL 100 MG
1 TABLET ORAL DAILY
Status: CANCELLED | OUTPATIENT
Start: 2019-01-01

## 2019-01-01 RX ORDER — ALPRAZOLAM 0.5 MG/1
0.25 TABLET ORAL 3 TIMES DAILY PRN
Status: DISCONTINUED | OUTPATIENT
Start: 2019-01-01 | End: 2019-01-01 | Stop reason: HOSPADM

## 2019-01-01 RX ORDER — POTASSIUM CHLORIDE 750 MG/1
10 TABLET, EXTENDED RELEASE ORAL 2 TIMES DAILY
Qty: 60 TABLET | Refills: 3 | Status: SHIPPED | OUTPATIENT
Start: 2019-01-01 | End: 2019-01-01

## 2019-01-01 RX ORDER — FUROSEMIDE 10 MG/ML
40 INJECTION INTRAMUSCULAR; INTRAVENOUS ONCE
Status: COMPLETED | OUTPATIENT
Start: 2019-01-01 | End: 2019-01-01

## 2019-01-01 RX ORDER — ISOSORBIDE MONONITRATE 60 MG/1
60 TABLET, EXTENDED RELEASE ORAL DAILY
Status: DISCONTINUED | OUTPATIENT
Start: 2019-01-01 | End: 2019-12-04 | Stop reason: HOSPADM

## 2019-01-01 RX ORDER — AMLODIPINE BESYLATE 5 MG/1
5 TABLET ORAL NIGHTLY
Status: DISCONTINUED | OUTPATIENT
Start: 2019-01-01 | End: 2019-01-01 | Stop reason: HOSPADM

## 2019-01-01 RX ORDER — ACETAMINOPHEN 325 MG/1
650 TABLET ORAL EVERY 4 HOURS PRN
Status: CANCELLED | OUTPATIENT
Start: 2019-01-01

## 2019-01-01 RX ORDER — SODIUM CHLORIDE 9 MG/ML
INJECTION, SOLUTION INTRAVENOUS CONTINUOUS
Status: DISCONTINUED | OUTPATIENT
Start: 2019-01-01 | End: 2019-01-01 | Stop reason: HOSPADM

## 2019-01-01 RX ORDER — MULTIVITAMIN/IRON/FOLIC ACID 18MG-0.4MG
500 TABLET ORAL DAILY
Status: CANCELLED | OUTPATIENT
Start: 2019-01-01

## 2019-01-01 RX ORDER — FUROSEMIDE 10 MG/ML
20 INJECTION INTRAMUSCULAR; INTRAVENOUS 2 TIMES DAILY
Status: DISCONTINUED | OUTPATIENT
Start: 2019-01-01 | End: 2019-01-01

## 2019-01-01 RX ORDER — CLOPIDOGREL BISULFATE 75 MG/1
75 TABLET ORAL DAILY
Status: CANCELLED | OUTPATIENT
Start: 2019-01-01

## 2019-01-01 RX ORDER — POTASSIUM CHLORIDE 20 MEQ/1
40 TABLET, EXTENDED RELEASE ORAL PRN
Status: DISCONTINUED | OUTPATIENT
Start: 2019-01-01 | End: 2019-01-01 | Stop reason: HOSPADM

## 2019-01-01 RX ORDER — ONDANSETRON 2 MG/ML
4 INJECTION INTRAMUSCULAR; INTRAVENOUS EVERY 6 HOURS PRN
Status: DISCONTINUED | OUTPATIENT
Start: 2019-01-01 | End: 2019-01-01 | Stop reason: HOSPADM

## 2019-01-01 RX ORDER — ATORVASTATIN CALCIUM 10 MG/1
10 TABLET, FILM COATED ORAL NIGHTLY
Qty: 30 TABLET | Refills: 3 | Status: ON HOLD | OUTPATIENT
Start: 2019-01-01 | End: 2019-01-01 | Stop reason: ALTCHOICE

## 2019-01-01 RX ORDER — FAMOTIDINE 20 MG/1
20 TABLET, FILM COATED ORAL ONCE
Status: COMPLETED | OUTPATIENT
Start: 2019-01-01 | End: 2019-01-01

## 2019-01-01 RX ORDER — METHYLPREDNISOLONE SODIUM SUCCINATE 40 MG/ML
40 INJECTION, POWDER, LYOPHILIZED, FOR SOLUTION INTRAMUSCULAR; INTRAVENOUS EVERY 8 HOURS
Status: DISCONTINUED | OUTPATIENT
Start: 2019-01-01 | End: 2019-01-01

## 2019-01-01 RX ORDER — ALPRAZOLAM 0.25 MG/1
0.25 TABLET ORAL 2 TIMES DAILY PRN
Status: DISCONTINUED | OUTPATIENT
Start: 2019-01-01 | End: 2019-01-01 | Stop reason: HOSPADM

## 2019-01-01 RX ORDER — VITAMIN B COMPLEX
1 TABLET ORAL DAILY
Status: DISCONTINUED | OUTPATIENT
Start: 2019-01-01 | End: 2019-01-01 | Stop reason: HOSPADM

## 2019-01-01 RX ORDER — POTASSIUM CHLORIDE 20MEQ/15ML
40 LIQUID (ML) ORAL PRN
Status: DISCONTINUED | OUTPATIENT
Start: 2019-01-01 | End: 2019-01-01 | Stop reason: HOSPADM

## 2019-01-01 RX ORDER — ONDANSETRON 4 MG/1
4 TABLET, ORALLY DISINTEGRATING ORAL EVERY 8 HOURS PRN
Status: DISCONTINUED | OUTPATIENT
Start: 2019-01-01 | End: 2019-01-01 | Stop reason: HOSPADM

## 2019-01-01 RX ORDER — BENZONATATE 100 MG/1
100 CAPSULE ORAL 3 TIMES DAILY PRN
Status: CANCELLED | OUTPATIENT
Start: 2019-01-01

## 2019-01-01 RX ORDER — LEVOTHYROXINE SODIUM 88 UG/1
88 TABLET ORAL DAILY
Qty: 30 TABLET | Refills: 3 | Status: SHIPPED | OUTPATIENT
Start: 2019-01-01

## 2019-01-01 RX ORDER — ATORVASTATIN CALCIUM 10 MG/1
10 TABLET, FILM COATED ORAL NIGHTLY
Status: DISCONTINUED | OUTPATIENT
Start: 2019-01-01 | End: 2019-01-01 | Stop reason: CLARIF

## 2019-01-01 RX ORDER — RANOLAZINE 500 MG/1
500 TABLET, EXTENDED RELEASE ORAL 2 TIMES DAILY
Status: CANCELLED | OUTPATIENT
Start: 2019-01-01

## 2019-01-01 RX ORDER — BUMETANIDE 1 MG/1
1 TABLET ORAL DAILY
Qty: 30 TABLET | Refills: 3 | Status: SHIPPED | OUTPATIENT
Start: 2019-01-01

## 2019-01-01 RX ORDER — FUROSEMIDE 10 MG/ML
20 INJECTION INTRAMUSCULAR; INTRAVENOUS ONCE
Status: COMPLETED | OUTPATIENT
Start: 2019-01-01 | End: 2019-01-01

## 2019-01-01 RX ORDER — ISOSORBIDE MONONITRATE 30 MG/1
30 TABLET, EXTENDED RELEASE ORAL DAILY
Status: DISCONTINUED | OUTPATIENT
Start: 2019-01-01 | End: 2019-01-01 | Stop reason: HOSPADM

## 2019-01-01 RX ORDER — DOXYCYCLINE HYCLATE 100 MG
100 TABLET ORAL EVERY 12 HOURS SCHEDULED
Qty: 12 TABLET | Refills: 0 | Status: SHIPPED | OUTPATIENT
Start: 2019-01-01 | End: 2019-01-01

## 2019-01-01 RX ORDER — GUAIFENESIN/DEXTROMETHORPHAN 100-10MG/5
15 SYRUP ORAL EVERY 4 HOURS PRN
Status: DISCONTINUED | OUTPATIENT
Start: 2019-01-01 | End: 2019-01-01 | Stop reason: HOSPADM

## 2019-01-01 RX ORDER — BUMETANIDE 0.25 MG/ML
1 INJECTION, SOLUTION INTRAMUSCULAR; INTRAVENOUS 2 TIMES DAILY
Status: COMPLETED | OUTPATIENT
Start: 2019-01-01 | End: 2019-01-01

## 2019-01-01 RX ORDER — LEVOTHYROXINE SODIUM 88 UG/1
88 TABLET ORAL DAILY
Status: DISCONTINUED | OUTPATIENT
Start: 2019-01-01 | End: 2019-12-04 | Stop reason: HOSPADM

## 2019-01-01 RX ORDER — IBUPROFEN 200 MG
TABLET ORAL
Refills: 1 | COMMUNITY
Start: 2019-01-01 | End: 2019-01-01 | Stop reason: ALTCHOICE

## 2019-01-01 RX ORDER — ATORVASTATIN CALCIUM 10 MG/1
10 TABLET, FILM COATED ORAL NIGHTLY
Status: CANCELLED | OUTPATIENT
Start: 2019-01-01

## 2019-01-01 RX ORDER — BUMETANIDE 1 MG/1
1 TABLET ORAL 2 TIMES DAILY
Status: DISCONTINUED | OUTPATIENT
Start: 2019-01-01 | End: 2019-01-01

## 2019-01-01 RX ORDER — DOCUSATE SODIUM 100 MG/1
100 CAPSULE, LIQUID FILLED ORAL DAILY
Status: DISCONTINUED | OUTPATIENT
Start: 2019-01-01 | End: 2019-12-04 | Stop reason: HOSPADM

## 2019-01-01 RX ORDER — BUMETANIDE 1 MG/1
2 TABLET ORAL 2 TIMES DAILY
Status: CANCELLED | OUTPATIENT
Start: 2019-01-01

## 2019-01-01 RX ORDER — BUMETANIDE 0.25 MG/ML
2 INJECTION, SOLUTION INTRAMUSCULAR; INTRAVENOUS 2 TIMES DAILY
Status: COMPLETED | OUTPATIENT
Start: 2019-01-01 | End: 2019-01-01

## 2019-01-01 RX ORDER — ASPIRIN 81 MG/1
324 TABLET, CHEWABLE ORAL ONCE
Status: DISCONTINUED | OUTPATIENT
Start: 2019-01-01 | End: 2019-01-01 | Stop reason: HOSPADM

## 2019-01-01 RX ORDER — GUAIFENESIN/DEXTROMETHORPHAN 100-10MG/5
10 SYRUP ORAL EVERY 4 HOURS PRN
Status: DISCONTINUED | OUTPATIENT
Start: 2019-01-01 | End: 2019-01-01 | Stop reason: HOSPADM

## 2019-01-01 RX ORDER — DOXYCYCLINE HYCLATE 100 MG
100 TABLET ORAL EVERY 12 HOURS SCHEDULED
Status: COMPLETED | OUTPATIENT
Start: 2019-01-01 | End: 2019-01-01

## 2019-01-01 RX ORDER — POTASSIUM CHLORIDE 20 MEQ/1
10 TABLET, EXTENDED RELEASE ORAL 2 TIMES DAILY
Status: DISCONTINUED | OUTPATIENT
Start: 2019-01-01 | End: 2019-12-04 | Stop reason: HOSPADM

## 2019-01-01 RX ORDER — ACETAMINOPHEN 325 MG/1
650 TABLET ORAL EVERY 4 HOURS PRN
Status: DISCONTINUED | OUTPATIENT
Start: 2019-01-01 | End: 2019-12-04 | Stop reason: HOSPADM

## 2019-01-01 RX ORDER — ALPRAZOLAM 0.5 MG/1
0.5 TABLET ORAL NIGHTLY PRN
Status: DISCONTINUED | OUTPATIENT
Start: 2019-01-01 | End: 2019-01-01

## 2019-01-01 RX ORDER — BUMETANIDE 1 MG/1
2 TABLET ORAL 2 TIMES DAILY
Status: DISCONTINUED | OUTPATIENT
Start: 2019-01-01 | End: 2019-01-01 | Stop reason: HOSPADM

## 2019-01-01 RX ORDER — ACETYLCYSTEINE 200 MG/ML
600 SOLUTION ORAL; RESPIRATORY (INHALATION) 2 TIMES DAILY
Status: DISCONTINUED | OUTPATIENT
Start: 2019-01-01 | End: 2019-01-01

## 2019-01-01 RX ORDER — 0.9 % SODIUM CHLORIDE 0.9 %
500 INTRAVENOUS SOLUTION INTRAVENOUS ONCE
Status: COMPLETED | OUTPATIENT
Start: 2019-01-01 | End: 2019-01-01

## 2019-01-01 RX ORDER — VITAMIN B COMPLEX
1 TABLET ORAL DAILY
Status: DISCONTINUED | OUTPATIENT
Start: 2019-01-01 | End: 2019-12-04 | Stop reason: HOSPADM

## 2019-01-01 RX ORDER — ONDANSETRON 2 MG/ML
4 INJECTION INTRAMUSCULAR; INTRAVENOUS EVERY 6 HOURS PRN
Status: CANCELLED | OUTPATIENT
Start: 2019-01-01

## 2019-01-01 RX ORDER — ALBUTEROL SULFATE 2.5 MG/3ML
2.5 SOLUTION RESPIRATORY (INHALATION) 4 TIMES DAILY
Status: DISCONTINUED | OUTPATIENT
Start: 2019-01-01 | End: 2019-12-04 | Stop reason: HOSPADM

## 2019-01-01 RX ORDER — SUCRALFATE 1 G/1
1 TABLET ORAL
Status: CANCELLED | OUTPATIENT
Start: 2019-01-01

## 2019-01-01 RX ORDER — FLUTICASONE PROPIONATE 50 MCG
2 SPRAY, SUSPENSION (ML) NASAL DAILY
Qty: 1 BOTTLE | Refills: 3 | Status: SHIPPED | OUTPATIENT
Start: 2019-01-01 | End: 2019-01-01

## 2019-01-01 RX ORDER — ALPRAZOLAM 0.25 MG/1
0.25 TABLET ORAL 2 TIMES DAILY PRN
Status: CANCELLED | OUTPATIENT
Start: 2019-01-01

## 2019-01-01 RX ADMIN — CLOPIDOGREL BISULFATE 75 MG: 75 TABLET ORAL at 08:25

## 2019-01-01 RX ADMIN — AMLODIPINE BESYLATE 5 MG: 5 TABLET ORAL at 08:37

## 2019-01-01 RX ADMIN — AMLODIPINE BESYLATE 5 MG: 5 TABLET ORAL at 08:13

## 2019-01-01 RX ADMIN — LEVOTHYROXINE SODIUM 88 MCG: 88 TABLET ORAL at 06:56

## 2019-01-01 RX ADMIN — CLOPIDOGREL BISULFATE 75 MG: 75 TABLET ORAL at 13:07

## 2019-01-01 RX ADMIN — METOPROLOL TARTRATE 25 MG: 25 TABLET ORAL at 21:30

## 2019-01-01 RX ADMIN — ALBUTEROL SULFATE 2.5 MG: 2.5 SOLUTION RESPIRATORY (INHALATION) at 15:54

## 2019-01-01 RX ADMIN — PANTOPRAZOLE SODIUM 40 MG: 40 TABLET, DELAYED RELEASE ORAL at 16:01

## 2019-01-01 RX ADMIN — AMLODIPINE BESYLATE 5 MG: 5 TABLET ORAL at 11:58

## 2019-01-01 RX ADMIN — ALBUTEROL SULFATE 2.5 MG: 2.5 SOLUTION RESPIRATORY (INHALATION) at 17:09

## 2019-01-01 RX ADMIN — ALBUTEROL SULFATE 2.5 MG: 2.5 SOLUTION RESPIRATORY (INHALATION) at 07:27

## 2019-01-01 RX ADMIN — ALBUTEROL SULFATE 2.5 MG: 2.5 SOLUTION RESPIRATORY (INHALATION) at 20:41

## 2019-01-01 RX ADMIN — ISOSORBIDE MONONITRATE 30 MG: 30 TABLET ORAL at 21:44

## 2019-01-01 RX ADMIN — LEVOTHYROXINE SODIUM 88 MCG: 88 TABLET ORAL at 06:26

## 2019-01-01 RX ADMIN — IPRATROPIUM BROMIDE AND ALBUTEROL SULFATE 1 AMPULE: .5; 3 SOLUTION RESPIRATORY (INHALATION) at 14:20

## 2019-01-01 RX ADMIN — ATORVASTATIN CALCIUM 10 MG: 10 TABLET, FILM COATED ORAL at 20:38

## 2019-01-01 RX ADMIN — LEVOTHYROXINE SODIUM 88 MCG: 88 TABLET ORAL at 12:23

## 2019-01-01 RX ADMIN — POTASSIUM CHLORIDE 10 MEQ: 20 TABLET, EXTENDED RELEASE ORAL at 08:37

## 2019-01-01 RX ADMIN — FUROSEMIDE 40 MG: 40 INJECTION, SOLUTION INTRAMUSCULAR; INTRAVENOUS at 02:17

## 2019-01-01 RX ADMIN — ALPRAZOLAM 0.25 MG: 0.25 TABLET ORAL at 11:06

## 2019-01-01 RX ADMIN — Medication 500 MG: at 08:12

## 2019-01-01 RX ADMIN — ALPRAZOLAM 0.25 MG: 0.25 TABLET ORAL at 08:36

## 2019-01-01 RX ADMIN — CLOPIDOGREL BISULFATE 75 MG: 75 TABLET ORAL at 09:21

## 2019-01-01 RX ADMIN — ALPRAZOLAM 0.25 MG: 0.25 TABLET ORAL at 14:57

## 2019-01-01 RX ADMIN — BACITRACIN ZINC NEOMYCIN SULFATE POLYMYXIN B SULFATE: 400; 3.5; 5 OINTMENT TOPICAL at 14:09

## 2019-01-01 RX ADMIN — ONDANSETRON 4 MG: 2 INJECTION INTRAMUSCULAR; INTRAVENOUS at 07:34

## 2019-01-01 RX ADMIN — FAMOTIDINE 20 MG: 20 TABLET ORAL at 19:08

## 2019-01-01 RX ADMIN — SUCRALFATE 1 G: 1 TABLET ORAL at 16:33

## 2019-01-01 RX ADMIN — RANOLAZINE 500 MG: 500 TABLET, FILM COATED, EXTENDED RELEASE ORAL at 20:44

## 2019-01-01 RX ADMIN — VITAMIN D, TAB 1000IU (100/BT) 1000 UNITS: 25 TAB at 08:37

## 2019-01-01 RX ADMIN — AMLODIPINE BESYLATE 5 MG: 5 TABLET ORAL at 07:48

## 2019-01-01 RX ADMIN — SUCRALFATE 1 G: 1 TABLET ORAL at 10:52

## 2019-01-01 RX ADMIN — VITAMIN D, TAB 1000IU (100/BT) 1000 UNITS: 25 TAB at 08:20

## 2019-01-01 RX ADMIN — POTASSIUM CHLORIDE 10 MEQ: 20 TABLET, EXTENDED RELEASE ORAL at 21:23

## 2019-01-01 RX ADMIN — GUAIFENESIN AND DEXTROMETHORPHAN 10 ML: 100; 10 SYRUP ORAL at 10:57

## 2019-01-01 RX ADMIN — GUAIFENESIN AND DEXTROMETHORPHAN 10 ML: 100; 10 SYRUP ORAL at 11:42

## 2019-01-01 RX ADMIN — DOXYCYCLINE HYCLATE 100 MG: 100 TABLET, COATED ORAL at 08:07

## 2019-01-01 RX ADMIN — VITAMIN D, TAB 1000IU (100/BT) 1000 UNITS: 25 TAB at 10:58

## 2019-01-01 RX ADMIN — Medication 10 ML: at 01:27

## 2019-01-01 RX ADMIN — AMLODIPINE BESYLATE 5 MG: 5 TABLET ORAL at 09:13

## 2019-01-01 RX ADMIN — POTASSIUM CHLORIDE 10 MEQ: 750 TABLET, FILM COATED, EXTENDED RELEASE ORAL at 10:46

## 2019-01-01 RX ADMIN — PANTOPRAZOLE SODIUM 40 MG: 40 TABLET, DELAYED RELEASE ORAL at 04:27

## 2019-01-01 RX ADMIN — ISOSORBIDE MONONITRATE 30 MG: 30 TABLET ORAL at 20:27

## 2019-01-01 RX ADMIN — Medication 500 MG: at 08:03

## 2019-01-01 RX ADMIN — ALBUTEROL SULFATE 2.5 MG: 2.5 SOLUTION RESPIRATORY (INHALATION) at 15:46

## 2019-01-01 RX ADMIN — BENZONATATE 100 MG: 100 CAPSULE ORAL at 23:09

## 2019-01-01 RX ADMIN — SUCRALFATE 1 G: 1 TABLET ORAL at 21:00

## 2019-01-01 RX ADMIN — IPRATROPIUM BROMIDE AND ALBUTEROL SULFATE 1 AMPULE: .5; 3 SOLUTION RESPIRATORY (INHALATION) at 09:07

## 2019-01-01 RX ADMIN — DOCUSATE SODIUM 100 MG: 100 CAPSULE, LIQUID FILLED ORAL at 11:29

## 2019-01-01 RX ADMIN — BUMETANIDE 1 MG: 1 TABLET ORAL at 08:13

## 2019-01-01 RX ADMIN — METOPROLOL TARTRATE 25 MG: 25 TABLET ORAL at 09:13

## 2019-01-01 RX ADMIN — ONDANSETRON 4 MG: 2 INJECTION INTRAMUSCULAR; INTRAVENOUS at 02:00

## 2019-01-01 RX ADMIN — Medication 10 ML: at 21:00

## 2019-01-01 RX ADMIN — LEVOTHYROXINE SODIUM 88 MCG: 88 TABLET ORAL at 07:00

## 2019-01-01 RX ADMIN — POTASSIUM CHLORIDE 10 MEQ: 20 TABLET, EXTENDED RELEASE ORAL at 08:05

## 2019-01-01 RX ADMIN — BUMETANIDE 2 MG: 1 TABLET ORAL at 21:35

## 2019-01-01 RX ADMIN — HEPARIN SODIUM 5000 UNITS: 5000 INJECTION INTRAVENOUS; SUBCUTANEOUS at 05:46

## 2019-01-01 RX ADMIN — POTASSIUM CHLORIDE 10 MEQ: 20 TABLET, EXTENDED RELEASE ORAL at 10:13

## 2019-01-01 RX ADMIN — ALBUTEROL SULFATE 2.5 MG: 2.5 SOLUTION RESPIRATORY (INHALATION) at 12:21

## 2019-01-01 RX ADMIN — LEVOTHYROXINE SODIUM 88 MCG: 88 TABLET ORAL at 11:58

## 2019-01-01 RX ADMIN — Medication 500 MG: at 08:08

## 2019-01-01 RX ADMIN — ALBUTEROL SULFATE 2.5 MG: 2.5 SOLUTION RESPIRATORY (INHALATION) at 12:17

## 2019-01-01 RX ADMIN — PANTOPRAZOLE SODIUM 40 MG: 40 TABLET, DELAYED RELEASE ORAL at 03:52

## 2019-01-01 RX ADMIN — GUAIFENESIN AND DEXTROMETHORPHAN 10 ML: 100; 10 SYRUP ORAL at 10:59

## 2019-01-01 RX ADMIN — ACETAMINOPHEN 650 MG: 325 TABLET ORAL at 07:35

## 2019-01-01 RX ADMIN — DOXYCYCLINE HYCLATE 100 MG: 100 TABLET, COATED ORAL at 22:21

## 2019-01-01 RX ADMIN — DOXYCYCLINE HYCLATE 100 MG: 100 TABLET, COATED ORAL at 21:04

## 2019-01-01 RX ADMIN — ISOSORBIDE MONONITRATE 30 MG: 30 TABLET ORAL at 11:58

## 2019-01-01 RX ADMIN — METOPROLOL TARTRATE 25 MG: 25 TABLET ORAL at 20:44

## 2019-01-01 RX ADMIN — SUCRALFATE 1 G: 1 TABLET ORAL at 07:37

## 2019-01-01 RX ADMIN — SUCRALFATE 1 G: 1 TABLET ORAL at 11:53

## 2019-01-01 RX ADMIN — ALBUTEROL SULFATE 2.5 MG: 2.5 SOLUTION RESPIRATORY (INHALATION) at 17:19

## 2019-01-01 RX ADMIN — PANTOPRAZOLE SODIUM 40 MG: 40 TABLET, DELAYED RELEASE ORAL at 06:34

## 2019-01-01 RX ADMIN — LEVOTHYROXINE SODIUM 88 MCG: 88 TABLET ORAL at 05:50

## 2019-01-01 RX ADMIN — BUMETANIDE 2 MG: 1 TABLET ORAL at 08:12

## 2019-01-01 RX ADMIN — ALPRAZOLAM 0.25 MG: 0.25 TABLET ORAL at 07:35

## 2019-01-01 RX ADMIN — ONDANSETRON 4 MG: 2 INJECTION INTRAMUSCULAR; INTRAVENOUS at 16:36

## 2019-01-01 RX ADMIN — POTASSIUM CHLORIDE 10 MEQ: 20 TABLET, EXTENDED RELEASE ORAL at 07:34

## 2019-01-01 RX ADMIN — DOXYCYCLINE HYCLATE 100 MG: 100 TABLET, COATED ORAL at 21:23

## 2019-01-01 RX ADMIN — METOPROLOL TARTRATE 25 MG: 25 TABLET ORAL at 20:14

## 2019-01-01 RX ADMIN — POTASSIUM CHLORIDE 10 MEQ: 20 TABLET, EXTENDED RELEASE ORAL at 08:04

## 2019-01-01 RX ADMIN — POTASSIUM CHLORIDE 10 MEQ: 20 TABLET, EXTENDED RELEASE ORAL at 07:55

## 2019-01-01 RX ADMIN — METOPROLOL TARTRATE 25 MG: 25 TABLET ORAL at 07:52

## 2019-01-01 RX ADMIN — POTASSIUM CHLORIDE 10 MEQ: 750 TABLET, EXTENDED RELEASE ORAL at 08:36

## 2019-01-01 RX ADMIN — ANTACID TABLETS 500 MG: 500 TABLET, CHEWABLE ORAL at 11:29

## 2019-01-01 RX ADMIN — CLOPIDOGREL BISULFATE 75 MG: 75 TABLET ORAL at 08:13

## 2019-01-01 RX ADMIN — SUCRALFATE 1 G: 1 TABLET ORAL at 07:48

## 2019-01-01 RX ADMIN — ALBUTEROL SULFATE 2.5 MG: 2.5 SOLUTION RESPIRATORY (INHALATION) at 08:06

## 2019-01-01 RX ADMIN — Medication 500 MG: at 09:51

## 2019-01-01 RX ADMIN — GUAIFENESIN AND DEXTROMETHORPHAN 15 ML: 100; 10 SYRUP ORAL at 15:20

## 2019-01-01 RX ADMIN — POTASSIUM CHLORIDE 10 MEQ: 750 TABLET, EXTENDED RELEASE ORAL at 08:48

## 2019-01-01 RX ADMIN — ISOSORBIDE MONONITRATE 60 MG: 60 TABLET ORAL at 09:32

## 2019-01-01 RX ADMIN — BENZONATATE 100 MG: 100 CAPSULE ORAL at 20:46

## 2019-01-01 RX ADMIN — ALBUTEROL SULFATE 2.5 MG: 2.5 SOLUTION RESPIRATORY (INHALATION) at 16:02

## 2019-01-01 RX ADMIN — METOPROLOL TARTRATE 25 MG: 25 TABLET ORAL at 22:06

## 2019-01-01 RX ADMIN — SUCRALFATE 1 G: 1 TABLET ORAL at 08:13

## 2019-01-01 RX ADMIN — GUAIFENESIN AND DEXTROMETHORPHAN 15 ML: 100; 10 SYRUP ORAL at 19:57

## 2019-01-01 RX ADMIN — METHYLPREDNISOLONE SODIUM SUCCINATE 40 MG: 40 INJECTION, POWDER, FOR SOLUTION INTRAMUSCULAR; INTRAVENOUS at 12:10

## 2019-01-01 RX ADMIN — SUCRALFATE 1 G: 1 TABLET ORAL at 16:48

## 2019-01-01 RX ADMIN — FUROSEMIDE 20 MG: 10 INJECTION, SOLUTION INTRAMUSCULAR; INTRAVENOUS at 08:13

## 2019-01-01 RX ADMIN — IPRATROPIUM BROMIDE AND ALBUTEROL SULFATE 1 AMPULE: .5; 3 SOLUTION RESPIRATORY (INHALATION) at 11:36

## 2019-01-01 RX ADMIN — DOXYCYCLINE HYCLATE 100 MG: 100 TABLET, COATED ORAL at 21:42

## 2019-01-01 RX ADMIN — SUCRALFATE 1 G: 1 TABLET ORAL at 21:03

## 2019-01-01 RX ADMIN — ACETAMINOPHEN 650 MG: 325 TABLET ORAL at 07:52

## 2019-01-01 RX ADMIN — GUAIFENESIN AND DEXTROMETHORPHAN 15 ML: 100; 10 SYRUP ORAL at 07:34

## 2019-01-01 RX ADMIN — DOXYCYCLINE HYCLATE 100 MG: 100 TABLET, COATED ORAL at 08:22

## 2019-01-01 RX ADMIN — Medication 500 MG: at 08:07

## 2019-01-01 RX ADMIN — FLUTICASONE PROPIONATE 2 SPRAY: 50 SPRAY, METERED NASAL at 09:52

## 2019-01-01 RX ADMIN — Medication 500 MG: at 10:45

## 2019-01-01 RX ADMIN — BUMETANIDE 2 MG: 1 TABLET ORAL at 21:30

## 2019-01-01 RX ADMIN — ALBUTEROL SULFATE 2.5 MG: 2.5 SOLUTION RESPIRATORY (INHALATION) at 08:03

## 2019-01-01 RX ADMIN — ALBUTEROL SULFATE 2.5 MG: 2.5 SOLUTION RESPIRATORY (INHALATION) at 22:15

## 2019-01-01 RX ADMIN — RANOLAZINE 500 MG: 500 TABLET, FILM COATED, EXTENDED RELEASE ORAL at 09:03

## 2019-01-01 RX ADMIN — BENZONATATE 100 MG: 100 CAPSULE ORAL at 07:40

## 2019-01-01 RX ADMIN — ALPRAZOLAM 0.25 MG: 0.5 TABLET ORAL at 23:45

## 2019-01-01 RX ADMIN — POTASSIUM CHLORIDE 10 MEQ: 20 TABLET, EXTENDED RELEASE ORAL at 20:44

## 2019-01-01 RX ADMIN — ALBUTEROL SULFATE 2.5 MG: 2.5 SOLUTION RESPIRATORY (INHALATION) at 18:14

## 2019-01-01 RX ADMIN — AMLODIPINE BESYLATE 5 MG: 5 TABLET ORAL at 08:14

## 2019-01-01 RX ADMIN — POTASSIUM CHLORIDE 10 MEQ: 20 TABLET, EXTENDED RELEASE ORAL at 21:32

## 2019-01-01 RX ADMIN — Medication 500 MG: at 09:32

## 2019-01-01 RX ADMIN — PANTOPRAZOLE SODIUM 40 MG: 40 TABLET, DELAYED RELEASE ORAL at 05:13

## 2019-01-01 RX ADMIN — SUCRALFATE 1 G: 1 TABLET ORAL at 14:31

## 2019-01-01 RX ADMIN — FUROSEMIDE 20 MG: 10 INJECTION, SOLUTION INTRAMUSCULAR; INTRAVENOUS at 05:46

## 2019-01-01 RX ADMIN — AMLODIPINE BESYLATE 5 MG: 5 TABLET ORAL at 19:44

## 2019-01-01 RX ADMIN — ALBUTEROL SULFATE 2.5 MG: 2.5 SOLUTION RESPIRATORY (INHALATION) at 16:25

## 2019-01-01 RX ADMIN — AMLODIPINE BESYLATE 5 MG: 5 TABLET ORAL at 10:40

## 2019-01-01 RX ADMIN — POLYETHYLENE GLYCOL 3350 17 G: 17 POWDER, FOR SOLUTION ORAL at 10:02

## 2019-01-01 RX ADMIN — ACETYLCYSTEINE 600 MG: 200 SOLUTION ORAL; RESPIRATORY (INHALATION) at 20:03

## 2019-01-01 RX ADMIN — PANTOPRAZOLE SODIUM 40 MG: 40 TABLET, DELAYED RELEASE ORAL at 08:21

## 2019-01-01 RX ADMIN — ALBUTEROL SULFATE 2.5 MG: 2.5 SOLUTION RESPIRATORY (INHALATION) at 07:39

## 2019-01-01 RX ADMIN — BENZONATATE 100 MG: 100 CAPSULE ORAL at 14:43

## 2019-01-01 RX ADMIN — RANOLAZINE 500 MG: 500 TABLET, FILM COATED, EXTENDED RELEASE ORAL at 08:13

## 2019-01-01 RX ADMIN — IPRATROPIUM BROMIDE AND ALBUTEROL SULFATE 1 AMPULE: .5; 3 SOLUTION RESPIRATORY (INHALATION) at 07:40

## 2019-01-01 RX ADMIN — LEVOTHYROXINE SODIUM 88 MCG: 88 TABLET ORAL at 08:37

## 2019-01-01 RX ADMIN — DOXYCYCLINE HYCLATE 100 MG: 100 TABLET, COATED ORAL at 08:52

## 2019-01-01 RX ADMIN — METOPROLOL TARTRATE 25 MG: 25 TABLET ORAL at 08:11

## 2019-01-01 RX ADMIN — ALBUTEROL SULFATE 2.5 MG: 2.5 SOLUTION RESPIRATORY (INHALATION) at 20:05

## 2019-01-01 RX ADMIN — AMLODIPINE BESYLATE 5 MG: 5 TABLET ORAL at 09:03

## 2019-01-01 RX ADMIN — MEROPENEM 1 G: 1 INJECTION, POWDER, FOR SOLUTION INTRAVENOUS at 17:22

## 2019-01-01 RX ADMIN — POTASSIUM CHLORIDE 10 MEQ: 20 TABLET, EXTENDED RELEASE ORAL at 05:46

## 2019-01-01 RX ADMIN — POLYETHYLENE GLYCOL 3350 17 G: 17 POWDER, FOR SOLUTION ORAL at 11:29

## 2019-01-01 RX ADMIN — SUCRALFATE 1 G: 1 TABLET ORAL at 08:20

## 2019-01-01 RX ADMIN — BUMETANIDE 2 MG: 0.25 INJECTION INTRAMUSCULAR; INTRAVENOUS at 11:57

## 2019-01-01 RX ADMIN — SUCRALFATE 1 G: 1 TABLET ORAL at 21:32

## 2019-01-01 RX ADMIN — BUMETANIDE 1 MG: 0.25 INJECTION INTRAMUSCULAR; INTRAVENOUS at 08:38

## 2019-01-01 RX ADMIN — ACETAMINOPHEN 650 MG: 325 TABLET ORAL at 13:24

## 2019-01-01 RX ADMIN — ISOSORBIDE MONONITRATE 60 MG: 60 TABLET ORAL at 08:14

## 2019-01-01 RX ADMIN — SUCRALFATE 1 G: 1 TABLET ORAL at 10:14

## 2019-01-01 RX ADMIN — VITAMIN D, TAB 1000IU (100/BT) 1000 UNITS: 25 TAB at 09:51

## 2019-01-01 RX ADMIN — HEPARIN SODIUM 5000 UNITS: 5000 INJECTION INTRAVENOUS; SUBCUTANEOUS at 21:36

## 2019-01-01 RX ADMIN — DOCUSATE SODIUM 100 MG: 100 CAPSULE, LIQUID FILLED ORAL at 10:04

## 2019-01-01 RX ADMIN — DOCUSATE SODIUM 100 MG: 100 CAPSULE, LIQUID FILLED ORAL at 09:32

## 2019-01-01 RX ADMIN — ISOSORBIDE MONONITRATE 30 MG: 30 TABLET ORAL at 20:32

## 2019-01-01 RX ADMIN — Medication 500 MG: at 07:48

## 2019-01-01 RX ADMIN — RANOLAZINE 500 MG: 500 TABLET, FILM COATED, EXTENDED RELEASE ORAL at 08:20

## 2019-01-01 RX ADMIN — GUAIFENESIN AND DEXTROMETHORPHAN 15 ML: 100; 10 SYRUP ORAL at 17:50

## 2019-01-01 RX ADMIN — PANTOPRAZOLE SODIUM 40 MG: 40 TABLET, DELAYED RELEASE ORAL at 10:00

## 2019-01-01 RX ADMIN — Medication 2 PUFF: at 18:04

## 2019-01-01 RX ADMIN — ALBUTEROL SULFATE 2.5 MG: 2.5 SOLUTION RESPIRATORY (INHALATION) at 11:08

## 2019-01-01 RX ADMIN — AMLODIPINE BESYLATE 5 MG: 5 TABLET ORAL at 09:31

## 2019-01-01 RX ADMIN — AMLODIPINE BESYLATE 5 MG: 5 TABLET ORAL at 10:15

## 2019-01-01 RX ADMIN — BENZONATATE 100 MG: 100 CAPSULE ORAL at 00:04

## 2019-01-01 RX ADMIN — HEPARIN SODIUM 5000 UNITS: 5000 INJECTION INTRAVENOUS; SUBCUTANEOUS at 14:04

## 2019-01-01 RX ADMIN — Medication 500 MG: at 08:37

## 2019-01-01 RX ADMIN — BENZONATATE 100 MG: 100 CAPSULE ORAL at 10:15

## 2019-01-01 RX ADMIN — POTASSIUM CHLORIDE 10 MEQ: 750 TABLET, EXTENDED RELEASE ORAL at 09:04

## 2019-01-01 RX ADMIN — ISOSORBIDE MONONITRATE 30 MG: 30 TABLET ORAL at 16:27

## 2019-01-01 RX ADMIN — SODIUM CHLORIDE, PRESERVATIVE FREE 10 ML: 5 INJECTION INTRAVENOUS at 08:53

## 2019-01-01 RX ADMIN — MEROPENEM 1 G: 1 INJECTION, POWDER, FOR SOLUTION INTRAVENOUS at 08:53

## 2019-01-01 RX ADMIN — SUCRALFATE 1 G: 1 TABLET ORAL at 13:24

## 2019-01-01 RX ADMIN — LEVOTHYROXINE SODIUM 88 MCG: 88 TABLET ORAL at 06:18

## 2019-01-01 RX ADMIN — METOPROLOL TARTRATE 25 MG: 25 TABLET ORAL at 08:22

## 2019-01-01 RX ADMIN — POTASSIUM CHLORIDE 10 MEQ: 750 TABLET, EXTENDED RELEASE ORAL at 08:30

## 2019-01-01 RX ADMIN — LEVOTHYROXINE SODIUM 88 MCG: 88 TABLET ORAL at 04:27

## 2019-01-01 RX ADMIN — CLOPIDOGREL BISULFATE 75 MG: 75 TABLET ORAL at 07:51

## 2019-01-01 RX ADMIN — ALBUTEROL SULFATE 2.5 MG: 2.5 SOLUTION RESPIRATORY (INHALATION) at 08:41

## 2019-01-01 RX ADMIN — METOPROLOL TARTRATE 25 MG: 25 TABLET ORAL at 08:37

## 2019-01-01 RX ADMIN — SUCRALFATE 1 G: 1 TABLET ORAL at 16:24

## 2019-01-01 RX ADMIN — VITAMIN D, TAB 1000IU (100/BT) 1000 UNITS: 25 TAB at 07:52

## 2019-01-01 RX ADMIN — ISOSORBIDE MONONITRATE 60 MG: 60 TABLET ORAL at 09:03

## 2019-01-01 RX ADMIN — SODIUM CHLORIDE: 9 INJECTION, SOLUTION INTRAVENOUS at 00:15

## 2019-01-01 RX ADMIN — METOPROLOL TARTRATE 25 MG: 25 TABLET ORAL at 20:02

## 2019-01-01 RX ADMIN — ALPRAZOLAM 0.25 MG: 0.25 TABLET ORAL at 01:34

## 2019-01-01 RX ADMIN — DOXYCYCLINE HYCLATE 100 MG: 100 TABLET, COATED ORAL at 08:12

## 2019-01-01 RX ADMIN — SUCRALFATE 1 G: 1 TABLET ORAL at 11:08

## 2019-01-01 RX ADMIN — Medication 500 MG: at 08:20

## 2019-01-01 RX ADMIN — AMLODIPINE BESYLATE 5 MG: 5 TABLET ORAL at 09:50

## 2019-01-01 RX ADMIN — POTASSIUM CHLORIDE 10 MEQ: 20 TABLET, EXTENDED RELEASE ORAL at 10:38

## 2019-01-01 RX ADMIN — ISOSORBIDE MONONITRATE 60 MG: 60 TABLET ORAL at 08:12

## 2019-01-01 RX ADMIN — SUCRALFATE 1 G: 1 TABLET ORAL at 12:53

## 2019-01-01 RX ADMIN — ALBUTEROL SULFATE 2.5 MG: 2.5 SOLUTION RESPIRATORY (INHALATION) at 09:16

## 2019-01-01 RX ADMIN — VITAMIN D, TAB 1000IU (100/BT) 1000 UNITS: 25 TAB at 08:52

## 2019-01-01 RX ADMIN — IPRATROPIUM BROMIDE AND ALBUTEROL SULFATE 1 AMPULE: .5; 3 SOLUTION RESPIRATORY (INHALATION) at 17:26

## 2019-01-01 RX ADMIN — AMLODIPINE BESYLATE 5 MG: 5 TABLET ORAL at 09:20

## 2019-01-01 RX ADMIN — MAGNESIUM HYDROXIDE 30 ML: 400 SUSPENSION ORAL at 21:37

## 2019-01-01 RX ADMIN — POTASSIUM CHLORIDE 10 MEQ: 20 TABLET, EXTENDED RELEASE ORAL at 20:15

## 2019-01-01 RX ADMIN — PANTOPRAZOLE SODIUM 40 MG: 40 TABLET, DELAYED RELEASE ORAL at 06:05

## 2019-01-01 RX ADMIN — ONDANSETRON 4 MG: 2 INJECTION INTRAMUSCULAR; INTRAVENOUS at 11:04

## 2019-01-01 RX ADMIN — SUCRALFATE 1 G: 1 TABLET ORAL at 09:13

## 2019-01-01 RX ADMIN — POTASSIUM CHLORIDE 10 MEQ: 20 TABLET, EXTENDED RELEASE ORAL at 21:30

## 2019-01-01 RX ADMIN — POTASSIUM CHLORIDE 10 MEQ: 20 TABLET, EXTENDED RELEASE ORAL at 08:20

## 2019-01-01 RX ADMIN — BUMETANIDE 1 MG: 1 TABLET ORAL at 14:17

## 2019-01-01 RX ADMIN — VITAMIN D, TAB 1000IU (100/BT) 1000 UNITS: 25 TAB at 09:20

## 2019-01-01 RX ADMIN — SUCRALFATE 1 G: 1 TABLET ORAL at 17:28

## 2019-01-01 RX ADMIN — ALBUTEROL SULFATE 2.5 MG: 2.5 SOLUTION RESPIRATORY (INHALATION) at 12:20

## 2019-01-01 RX ADMIN — FUROSEMIDE 20 MG: 10 INJECTION, SOLUTION INTRAMUSCULAR; INTRAVENOUS at 08:20

## 2019-01-01 RX ADMIN — AZITHROMYCIN DIHYDRATE 250 MG: 500 INJECTION, POWDER, LYOPHILIZED, FOR SOLUTION INTRAVENOUS at 17:02

## 2019-01-01 RX ADMIN — ALBUTEROL SULFATE 2.5 MG: 2.5 SOLUTION RESPIRATORY (INHALATION) at 20:39

## 2019-01-01 RX ADMIN — RANOLAZINE 500 MG: 500 TABLET, FILM COATED, EXTENDED RELEASE ORAL at 10:04

## 2019-01-01 RX ADMIN — BUMETANIDE 2 MG: 1 TABLET ORAL at 22:28

## 2019-01-01 RX ADMIN — PANTOPRAZOLE SODIUM 40 MG: 40 TABLET, DELAYED RELEASE ORAL at 05:40

## 2019-01-01 RX ADMIN — Medication 10 ML: at 07:52

## 2019-01-01 RX ADMIN — RANOLAZINE 500 MG: 500 TABLET, FILM COATED, EXTENDED RELEASE ORAL at 21:32

## 2019-01-01 RX ADMIN — ALBUTEROL SULFATE 2.5 MG: 2.5 SOLUTION RESPIRATORY (INHALATION) at 21:41

## 2019-01-01 RX ADMIN — Medication 10 ML: at 08:53

## 2019-01-01 RX ADMIN — FLUTICASONE PROPIONATE 2 SPRAY: 50 SPRAY, METERED NASAL at 08:04

## 2019-01-01 RX ADMIN — AMLODIPINE BESYLATE 5 MG: 5 TABLET ORAL at 08:22

## 2019-01-01 RX ADMIN — SUCRALFATE 1 G: 1 TABLET ORAL at 10:00

## 2019-01-01 RX ADMIN — FUROSEMIDE 20 MG: 10 INJECTION, SOLUTION INTRAMUSCULAR; INTRAVENOUS at 17:22

## 2019-01-01 RX ADMIN — AMLODIPINE BESYLATE 5 MG: 5 TABLET ORAL at 10:59

## 2019-01-01 RX ADMIN — HEPARIN SODIUM 5000 UNITS: 5000 INJECTION INTRAVENOUS; SUBCUTANEOUS at 23:46

## 2019-01-01 RX ADMIN — ISOSORBIDE MONONITRATE 30 MG: 30 TABLET ORAL at 08:31

## 2019-01-01 RX ADMIN — SUCRALFATE 1 G: 1 TABLET ORAL at 21:07

## 2019-01-01 RX ADMIN — POLYETHYLENE GLYCOL 3350 17 G: 17 POWDER, FOR SOLUTION ORAL at 08:21

## 2019-01-01 RX ADMIN — PANTOPRAZOLE SODIUM 40 MG: 40 TABLET, DELAYED RELEASE ORAL at 08:48

## 2019-01-01 RX ADMIN — BUMETANIDE 1 MG: 1 TABLET ORAL at 10:44

## 2019-01-01 RX ADMIN — Medication 10 ML: at 08:20

## 2019-01-01 RX ADMIN — Medication 500 MG: at 10:59

## 2019-01-01 RX ADMIN — Medication 500 MG: at 10:16

## 2019-01-01 RX ADMIN — POTASSIUM CHLORIDE 10 MEQ: 750 TABLET, EXTENDED RELEASE ORAL at 08:12

## 2019-01-01 RX ADMIN — HEPARIN SODIUM 5000 UNITS: 5000 INJECTION INTRAVENOUS; SUBCUTANEOUS at 06:58

## 2019-01-01 RX ADMIN — ALBUTEROL SULFATE 2.5 MG: 2.5 SOLUTION RESPIRATORY (INHALATION) at 22:01

## 2019-01-01 RX ADMIN — PANTOPRAZOLE SODIUM 40 MG: 40 TABLET, DELAYED RELEASE ORAL at 05:00

## 2019-01-01 RX ADMIN — BENZONATATE 100 MG: 100 CAPSULE ORAL at 06:57

## 2019-01-01 RX ADMIN — DOXYCYCLINE HYCLATE 100 MG: 100 TABLET, COATED ORAL at 20:46

## 2019-01-01 RX ADMIN — ISOSORBIDE MONONITRATE 60 MG: 60 TABLET ORAL at 08:22

## 2019-01-01 RX ADMIN — BUMETANIDE 2 MG: 1 TABLET ORAL at 10:15

## 2019-01-01 RX ADMIN — AMLODIPINE BESYLATE 5 MG: 5 TABLET ORAL at 08:23

## 2019-01-01 RX ADMIN — ASPIRIN 81 MG 81 MG: 81 TABLET ORAL at 09:48

## 2019-01-01 RX ADMIN — POTASSIUM CHLORIDE 10 MEQ: 20 TABLET, EXTENDED RELEASE ORAL at 07:51

## 2019-01-01 RX ADMIN — SUCRALFATE 1 G: 1 TABLET ORAL at 12:17

## 2019-01-01 RX ADMIN — ASPIRIN 81 MG 81 MG: 81 TABLET ORAL at 08:52

## 2019-01-01 RX ADMIN — ALBUTEROL SULFATE 2.5 MG: 2.5 SOLUTION RESPIRATORY (INHALATION) at 21:54

## 2019-01-01 RX ADMIN — GUAIFENESIN AND DEXTROMETHORPHAN 10 ML: 100; 10 SYRUP ORAL at 15:05

## 2019-01-01 RX ADMIN — ISOSORBIDE MONONITRATE 30 MG: 30 TABLET ORAL at 20:26

## 2019-01-01 RX ADMIN — BUMETANIDE 1 MG: 0.25 INJECTION INTRAMUSCULAR; INTRAVENOUS at 07:34

## 2019-01-01 RX ADMIN — BUMETANIDE 2 MG: 0.25 INJECTION INTRAMUSCULAR; INTRAVENOUS at 20:02

## 2019-01-01 RX ADMIN — METOPROLOL TARTRATE 25 MG: 25 TABLET ORAL at 10:16

## 2019-01-01 RX ADMIN — SUCRALFATE 1 G: 1 TABLET ORAL at 20:52

## 2019-01-01 RX ADMIN — RANOLAZINE 500 MG: 500 TABLET, FILM COATED, EXTENDED RELEASE ORAL at 07:35

## 2019-01-01 RX ADMIN — LEVOTHYROXINE SODIUM 88 MCG: 88 TABLET ORAL at 06:43

## 2019-01-01 RX ADMIN — ISOSORBIDE MONONITRATE 30 MG: 30 TABLET ORAL at 22:21

## 2019-01-01 RX ADMIN — FLUTICASONE PROPIONATE 2 SPRAY: 50 SPRAY, METERED NASAL at 08:12

## 2019-01-01 RX ADMIN — BUMETANIDE 2 MG: 1 TABLET ORAL at 21:32

## 2019-01-01 RX ADMIN — IPRATROPIUM BROMIDE AND ALBUTEROL SULFATE 1 AMPULE: .5; 3 SOLUTION RESPIRATORY (INHALATION) at 21:28

## 2019-01-01 RX ADMIN — ALPRAZOLAM 0.25 MG: 0.25 TABLET ORAL at 19:41

## 2019-01-01 RX ADMIN — METOPROLOL TARTRATE 25 MG: 25 TABLET ORAL at 08:07

## 2019-01-01 RX ADMIN — RANOLAZINE 500 MG: 500 TABLET, FILM COATED, EXTENDED RELEASE ORAL at 20:50

## 2019-01-01 RX ADMIN — SUCRALFATE 1 G: 1 TABLET ORAL at 16:51

## 2019-01-01 RX ADMIN — METOPROLOL TARTRATE 25 MG: 25 TABLET ORAL at 08:13

## 2019-01-01 RX ADMIN — RANOLAZINE 500 MG: 500 TABLET, FILM COATED, EXTENDED RELEASE ORAL at 08:37

## 2019-01-01 RX ADMIN — SUCRALFATE 1 G: 1 TABLET ORAL at 21:22

## 2019-01-01 RX ADMIN — BUMETANIDE 2 MG: 1 TABLET ORAL at 09:32

## 2019-01-01 RX ADMIN — ASPIRIN 81 MG 81 MG: 81 TABLET ORAL at 08:07

## 2019-01-01 RX ADMIN — SUCRALFATE 1 G: 1 TABLET ORAL at 05:50

## 2019-01-01 RX ADMIN — Medication 500 MG: at 09:13

## 2019-01-01 RX ADMIN — METOPROLOL TARTRATE 25 MG: 25 TABLET ORAL at 21:03

## 2019-01-01 RX ADMIN — DOXYCYCLINE HYCLATE 100 MG: 100 TABLET, COATED ORAL at 09:50

## 2019-01-01 RX ADMIN — IPRATROPIUM BROMIDE AND ALBUTEROL SULFATE 1 AMPULE: .5; 3 SOLUTION RESPIRATORY (INHALATION) at 08:47

## 2019-01-01 RX ADMIN — ISOSORBIDE MONONITRATE 30 MG: 30 TABLET ORAL at 20:46

## 2019-01-01 RX ADMIN — ACETAMINOPHEN 650 MG: 325 TABLET ORAL at 18:09

## 2019-01-01 RX ADMIN — MEROPENEM 1 G: 1 INJECTION, POWDER, FOR SOLUTION INTRAVENOUS at 01:29

## 2019-01-01 RX ADMIN — BUMETANIDE 1 MG: 0.25 INJECTION INTRAMUSCULAR; INTRAVENOUS at 20:23

## 2019-01-01 RX ADMIN — LEVOTHYROXINE SODIUM 88 MCG: 88 TABLET ORAL at 05:00

## 2019-01-01 RX ADMIN — VITAMIN D, TAB 1000IU (100/BT) 1000 UNITS: 25 TAB at 09:31

## 2019-01-01 RX ADMIN — ALBUTEROL SULFATE 2.5 MG: 2.5 SOLUTION RESPIRATORY (INHALATION) at 15:53

## 2019-01-01 RX ADMIN — DOXYCYCLINE HYCLATE 100 MG: 100 TABLET, COATED ORAL at 11:06

## 2019-01-01 RX ADMIN — VITAMIN D, TAB 1000IU (100/BT) 1000 UNITS: 25 TAB at 08:07

## 2019-01-01 RX ADMIN — GUAIFENESIN AND DEXTROMETHORPHAN 10 ML: 100; 10 SYRUP ORAL at 05:05

## 2019-01-01 RX ADMIN — ONDANSETRON 4 MG: 2 INJECTION INTRAMUSCULAR; INTRAVENOUS at 06:03

## 2019-01-01 RX ADMIN — Medication 10 ML: at 04:29

## 2019-01-01 RX ADMIN — AMLODIPINE BESYLATE 5 MG: 5 TABLET ORAL at 08:20

## 2019-01-01 RX ADMIN — ISOSORBIDE MONONITRATE 30 MG: 30 TABLET ORAL at 09:51

## 2019-01-01 RX ADMIN — PANTOPRAZOLE SODIUM 40 MG: 40 TABLET, DELAYED RELEASE ORAL at 09:21

## 2019-01-01 RX ADMIN — ISOSORBIDE MONONITRATE 30 MG: 30 TABLET ORAL at 10:43

## 2019-01-01 RX ADMIN — BENZONATATE 100 MG: 100 CAPSULE ORAL at 09:49

## 2019-01-01 RX ADMIN — ONDANSETRON 4 MG: 2 INJECTION INTRAMUSCULAR; INTRAVENOUS at 16:03

## 2019-01-01 RX ADMIN — VITAMIN D, TAB 1000IU (100/BT) 1000 UNITS: 25 TAB at 08:12

## 2019-01-01 RX ADMIN — SUCRALFATE 1 G: 1 TABLET ORAL at 16:25

## 2019-01-01 RX ADMIN — SODIUM CHLORIDE, PRESERVATIVE FREE 10 ML: 5 INJECTION INTRAVENOUS at 21:08

## 2019-01-01 RX ADMIN — ASPIRIN 81 MG 81 MG: 81 TABLET ORAL at 08:20

## 2019-01-01 RX ADMIN — ENOXAPARIN SODIUM 80 MG: 80 INJECTION SUBCUTANEOUS at 16:01

## 2019-01-01 RX ADMIN — ALPRAZOLAM 0.25 MG: 0.25 TABLET ORAL at 23:56

## 2019-01-01 RX ADMIN — FLUTICASONE PROPIONATE 2 SPRAY: 50 SPRAY, METERED NASAL at 21:25

## 2019-01-01 RX ADMIN — POTASSIUM CHLORIDE 10 MEQ: 20 TABLET, EXTENDED RELEASE ORAL at 08:22

## 2019-01-01 RX ADMIN — CEFTRIAXONE SODIUM 1 G: 1 INJECTION, POWDER, FOR SOLUTION INTRAMUSCULAR; INTRAVENOUS at 16:34

## 2019-01-01 RX ADMIN — CLOPIDOGREL BISULFATE 75 MG: 75 TABLET ORAL at 07:35

## 2019-01-01 RX ADMIN — SUCRALFATE 1 G: 1 TABLET ORAL at 20:02

## 2019-01-01 RX ADMIN — FLUTICASONE PROPIONATE 2 SPRAY: 50 SPRAY, METERED NASAL at 07:52

## 2019-01-01 RX ADMIN — BENZONATATE 100 MG: 100 CAPSULE ORAL at 17:59

## 2019-01-01 RX ADMIN — ENOXAPARIN SODIUM 80 MG: 80 INJECTION SUBCUTANEOUS at 03:39

## 2019-01-01 RX ADMIN — METOPROLOL TARTRATE 25 MG: 25 TABLET ORAL at 08:52

## 2019-01-01 RX ADMIN — SUCRALFATE 1 G: 1 TABLET ORAL at 20:47

## 2019-01-01 RX ADMIN — BUMETANIDE 2 MG: 1 TABLET ORAL at 10:03

## 2019-01-01 RX ADMIN — ONDANSETRON 4 MG: 2 INJECTION INTRAMUSCULAR; INTRAVENOUS at 18:12

## 2019-01-01 RX ADMIN — RANOLAZINE 500 MG: 500 TABLET, FILM COATED, EXTENDED RELEASE ORAL at 21:30

## 2019-01-01 RX ADMIN — LEVOFLOXACIN 750 MG: 750 TABLET, FILM COATED ORAL at 23:59

## 2019-01-01 RX ADMIN — SUCRALFATE 1 G: 1 TABLET ORAL at 19:59

## 2019-01-01 RX ADMIN — POTASSIUM CHLORIDE 10 MEQ: 750 TABLET, EXTENDED RELEASE ORAL at 10:00

## 2019-01-01 RX ADMIN — SUCRALFATE 1 G: 1 TABLET ORAL at 17:10

## 2019-01-01 RX ADMIN — METHYLPREDNISOLONE SODIUM SUCCINATE 40 MG: 40 INJECTION, POWDER, FOR SOLUTION INTRAMUSCULAR; INTRAVENOUS at 19:04

## 2019-01-01 RX ADMIN — GUAIFENESIN AND DEXTROMETHORPHAN 15 ML: 100; 10 SYRUP ORAL at 03:52

## 2019-01-01 RX ADMIN — ALBUTEROL SULFATE 2.5 MG: 2.5 SOLUTION RESPIRATORY (INHALATION) at 19:50

## 2019-01-01 RX ADMIN — CLOPIDOGREL BISULFATE 75 MG: 75 TABLET ORAL at 08:11

## 2019-01-01 RX ADMIN — SUCRALFATE 1 G: 1 TABLET ORAL at 09:03

## 2019-01-01 RX ADMIN — POTASSIUM CHLORIDE 10 MEQ: 20 TABLET, EXTENDED RELEASE ORAL at 21:43

## 2019-01-01 RX ADMIN — POTASSIUM CHLORIDE 10 MEQ: 750 TABLET, EXTENDED RELEASE ORAL at 09:13

## 2019-01-01 RX ADMIN — SUCRALFATE 1 G: 1 TABLET ORAL at 16:06

## 2019-01-01 RX ADMIN — LEVOTHYROXINE SODIUM 88 MCG: 88 TABLET ORAL at 10:01

## 2019-01-01 RX ADMIN — LEVOTHYROXINE SODIUM 88 MCG: 88 TABLET ORAL at 05:05

## 2019-01-01 RX ADMIN — BENZONATATE 100 MG: 100 CAPSULE ORAL at 12:49

## 2019-01-01 RX ADMIN — BENZONATATE 100 MG: 100 CAPSULE ORAL at 09:57

## 2019-01-01 RX ADMIN — SUCRALFATE 1 G: 1 TABLET ORAL at 20:50

## 2019-01-01 RX ADMIN — METOPROLOL TARTRATE 25 MG: 25 TABLET ORAL at 10:01

## 2019-01-01 RX ADMIN — CLOPIDOGREL BISULFATE 75 MG: 75 TABLET ORAL at 13:24

## 2019-01-01 RX ADMIN — POTASSIUM CHLORIDE 10 MEQ: 20 TABLET, EXTENDED RELEASE ORAL at 21:00

## 2019-01-01 RX ADMIN — BENZONATATE 100 MG: 100 CAPSULE ORAL at 03:00

## 2019-01-01 RX ADMIN — ISOSORBIDE MONONITRATE 60 MG: 60 TABLET ORAL at 08:20

## 2019-01-01 RX ADMIN — ONDANSETRON 4 MG: 2 INJECTION INTRAMUSCULAR; INTRAVENOUS at 08:20

## 2019-01-01 RX ADMIN — POTASSIUM CHLORIDE 10 MEQ: 20 TABLET, EXTENDED RELEASE ORAL at 08:12

## 2019-01-01 RX ADMIN — BUMETANIDE 1 MG: 1 TABLET ORAL at 08:11

## 2019-01-01 RX ADMIN — ALPRAZOLAM 0.25 MG: 0.5 TABLET ORAL at 14:54

## 2019-01-01 RX ADMIN — BUMETANIDE 2 MG: 1 TABLET ORAL at 08:21

## 2019-01-01 RX ADMIN — Medication 500 MG: at 12:23

## 2019-01-01 RX ADMIN — SUCRALFATE 1 G: 1 TABLET ORAL at 08:36

## 2019-01-01 RX ADMIN — ISOSORBIDE MONONITRATE 30 MG: 30 TABLET ORAL at 09:20

## 2019-01-01 RX ADMIN — FUROSEMIDE 20 MG: 10 INJECTION, SOLUTION INTRAMUSCULAR; INTRAVENOUS at 00:00

## 2019-01-01 RX ADMIN — CLOPIDOGREL BISULFATE 75 MG: 75 TABLET ORAL at 09:03

## 2019-01-01 RX ADMIN — METOPROLOL TARTRATE 25 MG: 25 TABLET ORAL at 08:03

## 2019-01-01 RX ADMIN — PANTOPRAZOLE SODIUM 40 MG: 40 TABLET, DELAYED RELEASE ORAL at 08:02

## 2019-01-01 RX ADMIN — ATORVASTATIN CALCIUM 40 MG: 40 TABLET, FILM COATED ORAL at 00:14

## 2019-01-01 RX ADMIN — VITAMIN D, TAB 1000IU (100/BT) 1000 UNITS: 25 TAB at 08:23

## 2019-01-01 RX ADMIN — ALBUTEROL SULFATE 2.5 MG: 2.5 SOLUTION RESPIRATORY (INHALATION) at 13:05

## 2019-01-01 RX ADMIN — CLOPIDOGREL BISULFATE 75 MG: 75 TABLET ORAL at 08:20

## 2019-01-01 RX ADMIN — GUAIFENESIN AND DEXTROMETHORPHAN 10 ML: 100; 10 SYRUP ORAL at 21:52

## 2019-01-01 RX ADMIN — SUCRALFATE 1 G: 1 TABLET ORAL at 20:26

## 2019-01-01 RX ADMIN — FUROSEMIDE 20 MG: 10 INJECTION, SOLUTION INTRAMUSCULAR; INTRAVENOUS at 21:43

## 2019-01-01 RX ADMIN — BUMETANIDE 2 MG: 0.25 INJECTION INTRAMUSCULAR; INTRAVENOUS at 07:51

## 2019-01-01 RX ADMIN — SUCRALFATE 1 G: 1 TABLET ORAL at 20:23

## 2019-01-01 RX ADMIN — GUAIFENESIN AND DEXTROMETHORPHAN 15 ML: 100; 10 SYRUP ORAL at 20:46

## 2019-01-01 RX ADMIN — VITAMIN D, TAB 1000IU (100/BT) 1000 UNITS: 25 TAB at 10:01

## 2019-01-01 RX ADMIN — SUCRALFATE 1 G: 1 TABLET ORAL at 08:49

## 2019-01-01 RX ADMIN — CLOPIDOGREL BISULFATE 75 MG: 75 TABLET ORAL at 08:32

## 2019-01-01 RX ADMIN — PANTOPRAZOLE SODIUM 40 MG: 40 TABLET, DELAYED RELEASE ORAL at 06:39

## 2019-01-01 RX ADMIN — POTASSIUM CHLORIDE 10 MEQ: 20 TABLET, EXTENDED RELEASE ORAL at 20:23

## 2019-01-01 RX ADMIN — GUAIFENESIN AND DEXTROMETHORPHAN 15 ML: 100; 10 SYRUP ORAL at 23:59

## 2019-01-01 RX ADMIN — RANOLAZINE 500 MG: 500 TABLET, FILM COATED, EXTENDED RELEASE ORAL at 09:31

## 2019-01-01 RX ADMIN — ALBUTEROL SULFATE 2.5 MG: 2.5 SOLUTION RESPIRATORY (INHALATION) at 08:35

## 2019-01-01 RX ADMIN — ALBUTEROL SULFATE 2.5 MG: 2.5 SOLUTION RESPIRATORY (INHALATION) at 14:06

## 2019-01-01 RX ADMIN — Medication 500 MG: at 07:57

## 2019-01-01 RX ADMIN — ALBUTEROL SULFATE 2.5 MG: 2.5 SOLUTION RESPIRATORY (INHALATION) at 21:35

## 2019-01-01 RX ADMIN — ALPRAZOLAM 0.25 MG: 0.25 TABLET ORAL at 22:33

## 2019-01-01 RX ADMIN — METOPROLOL TARTRATE 25 MG: 25 TABLET ORAL at 21:53

## 2019-01-01 RX ADMIN — Medication 500 MG: at 08:13

## 2019-01-01 RX ADMIN — SUCRALFATE 1 G: 1 TABLET ORAL at 16:29

## 2019-01-01 RX ADMIN — GUAIFENESIN AND DEXTROMETHORPHAN 10 ML: 100; 10 SYRUP ORAL at 22:36

## 2019-01-01 RX ADMIN — Medication 500 MG: at 08:36

## 2019-01-01 RX ADMIN — IPRATROPIUM BROMIDE AND ALBUTEROL SULFATE 1 AMPULE: .5; 3 SOLUTION RESPIRATORY (INHALATION) at 20:25

## 2019-01-01 RX ADMIN — VITAMIN D, TAB 1000IU (100/BT) 1000 UNITS: 25 TAB at 07:48

## 2019-01-01 RX ADMIN — ACETYLCYSTEINE 600 MG: 200 SOLUTION ORAL; RESPIRATORY (INHALATION) at 08:57

## 2019-01-01 RX ADMIN — METOPROLOL TARTRATE 25 MG: 25 TABLET ORAL at 20:48

## 2019-01-01 RX ADMIN — DOCUSATE SODIUM 100 MG: 100 CAPSULE, LIQUID FILLED ORAL at 07:55

## 2019-01-01 RX ADMIN — GUAIFENESIN AND DEXTROMETHORPHAN 10 ML: 100; 10 SYRUP ORAL at 07:51

## 2019-01-01 RX ADMIN — ISOSORBIDE MONONITRATE 30 MG: 30 TABLET ORAL at 21:23

## 2019-01-01 RX ADMIN — PANTOPRAZOLE SODIUM 40 MG: 40 TABLET, DELAYED RELEASE ORAL at 08:12

## 2019-01-01 RX ADMIN — FUROSEMIDE 20 MG: 10 INJECTION, SOLUTION INTRAMUSCULAR; INTRAVENOUS at 19:08

## 2019-01-01 RX ADMIN — DOXYCYCLINE HYCLATE 100 MG: 100 TABLET, COATED ORAL at 20:25

## 2019-01-01 RX ADMIN — ASPIRIN 81 MG 81 MG: 81 TABLET ORAL at 07:51

## 2019-01-01 RX ADMIN — CEFTRIAXONE SODIUM 1 G: 1 INJECTION, POWDER, FOR SOLUTION INTRAMUSCULAR; INTRAVENOUS at 16:05

## 2019-01-01 RX ADMIN — PANTOPRAZOLE SODIUM 40 MG: 40 TABLET, DELAYED RELEASE ORAL at 04:30

## 2019-01-01 RX ADMIN — RANOLAZINE 500 MG: 500 TABLET, FILM COATED, EXTENDED RELEASE ORAL at 19:56

## 2019-01-01 RX ADMIN — VITAMIN D, TAB 1000IU (100/BT) 1000 UNITS: 25 TAB at 08:30

## 2019-01-01 RX ADMIN — ACETAMINOPHEN 650 MG: 325 TABLET ORAL at 09:04

## 2019-01-01 RX ADMIN — POTASSIUM CHLORIDE 10 MEQ: 20 TABLET, EXTENDED RELEASE ORAL at 08:11

## 2019-01-01 RX ADMIN — ISOSORBIDE MONONITRATE 60 MG: 60 TABLET ORAL at 10:03

## 2019-01-01 RX ADMIN — ASPIRIN 81 MG 81 MG: 81 TABLET ORAL at 08:04

## 2019-01-01 RX ADMIN — ALPRAZOLAM 0.25 MG: 0.25 TABLET ORAL at 09:04

## 2019-01-01 RX ADMIN — GUAIFENESIN AND DEXTROMETHORPHAN 10 ML: 100; 10 SYRUP ORAL at 15:01

## 2019-01-01 RX ADMIN — SUCRALFATE 1 G: 1 TABLET ORAL at 11:35

## 2019-01-01 RX ADMIN — ALBUTEROL SULFATE 2.5 MG: 2.5 SOLUTION RESPIRATORY (INHALATION) at 12:06

## 2019-01-01 RX ADMIN — ATORVASTATIN CALCIUM 10 MG: 10 TABLET, FILM COATED ORAL at 22:57

## 2019-01-01 RX ADMIN — SUCRALFATE 1 G: 1 TABLET ORAL at 08:30

## 2019-01-01 RX ADMIN — ACETAMINOPHEN 650 MG: 325 TABLET ORAL at 05:56

## 2019-01-01 RX ADMIN — ALBUTEROL SULFATE 2.5 MG: 2.5 SOLUTION RESPIRATORY (INHALATION) at 04:47

## 2019-01-01 RX ADMIN — BENZONATATE 100 MG: 100 CAPSULE ORAL at 17:50

## 2019-01-01 RX ADMIN — DOCUSATE SODIUM 100 MG: 100 CAPSULE, LIQUID FILLED ORAL at 08:11

## 2019-01-01 RX ADMIN — SUCRALFATE 1 G: 1 TABLET ORAL at 08:37

## 2019-01-01 RX ADMIN — Medication 500 MG: at 07:52

## 2019-01-01 RX ADMIN — POTASSIUM CHLORIDE 10 MEQ: 20 TABLET, EXTENDED RELEASE ORAL at 21:25

## 2019-01-01 RX ADMIN — SODIUM CHLORIDE, PRESERVATIVE FREE 10 ML: 5 INJECTION INTRAVENOUS at 20:44

## 2019-01-01 RX ADMIN — SUCRALFATE 1 G: 1 TABLET ORAL at 17:20

## 2019-01-01 RX ADMIN — DOXYCYCLINE HYCLATE 100 MG: 100 TABLET, COATED ORAL at 08:04

## 2019-01-01 RX ADMIN — ISOSORBIDE MONONITRATE 30 MG: 30 TABLET ORAL at 21:03

## 2019-01-01 RX ADMIN — GUAIFENESIN AND DEXTROMETHORPHAN 10 ML: 100; 10 SYRUP ORAL at 10:52

## 2019-01-01 RX ADMIN — CLOPIDOGREL BISULFATE 75 MG: 75 TABLET ORAL at 08:21

## 2019-01-01 RX ADMIN — PANTOPRAZOLE SODIUM 40 MG: 40 TABLET, DELAYED RELEASE ORAL at 16:51

## 2019-01-01 RX ADMIN — SUCRALFATE 1 G: 1 TABLET ORAL at 19:08

## 2019-01-01 RX ADMIN — PANTOPRAZOLE SODIUM 40 MG: 40 TABLET, DELAYED RELEASE ORAL at 06:56

## 2019-01-01 RX ADMIN — LEVOTHYROXINE SODIUM 88 MCG: 88 TABLET ORAL at 03:52

## 2019-01-01 RX ADMIN — LEVOTHYROXINE SODIUM 88 MCG: 88 TABLET ORAL at 09:20

## 2019-01-01 RX ADMIN — ONDANSETRON 4 MG: 2 INJECTION INTRAMUSCULAR; INTRAVENOUS at 09:10

## 2019-01-01 RX ADMIN — SUCRALFATE 1 G: 1 TABLET ORAL at 12:02

## 2019-01-01 RX ADMIN — RANOLAZINE 500 MG: 500 TABLET, FILM COATED, EXTENDED RELEASE ORAL at 20:14

## 2019-01-01 RX ADMIN — SUCRALFATE 1 G: 1 TABLET ORAL at 16:32

## 2019-01-01 RX ADMIN — GUAIFENESIN AND DEXTROMETHORPHAN 15 ML: 100; 10 SYRUP ORAL at 12:44

## 2019-01-01 RX ADMIN — ALBUTEROL SULFATE 2.5 MG: 2.5 SOLUTION RESPIRATORY (INHALATION) at 20:55

## 2019-01-01 RX ADMIN — IPRATROPIUM BROMIDE AND ALBUTEROL SULFATE 1 AMPULE: .5; 3 SOLUTION RESPIRATORY (INHALATION) at 22:03

## 2019-01-01 RX ADMIN — DOXYCYCLINE HYCLATE 100 MG: 100 TABLET, COATED ORAL at 11:08

## 2019-01-01 RX ADMIN — ONDANSETRON HYDROCHLORIDE 4 MG: 4 TABLET, FILM COATED ORAL at 11:26

## 2019-01-01 RX ADMIN — POTASSIUM CHLORIDE 10 MEQ: 750 TABLET, EXTENDED RELEASE ORAL at 09:21

## 2019-01-01 RX ADMIN — HEPARIN SODIUM 5000 UNITS: 5000 INJECTION INTRAVENOUS; SUBCUTANEOUS at 15:03

## 2019-01-01 RX ADMIN — SUCRALFATE 1 G: 1 TABLET ORAL at 05:05

## 2019-01-01 RX ADMIN — ISOSORBIDE MONONITRATE 30 MG: 30 TABLET ORAL at 10:01

## 2019-01-01 RX ADMIN — HEPARIN SODIUM 5000 UNITS: 5000 INJECTION INTRAVENOUS; SUBCUTANEOUS at 21:25

## 2019-01-01 RX ADMIN — ISOSORBIDE MONONITRATE 30 MG: 30 TABLET ORAL at 07:52

## 2019-01-01 RX ADMIN — ALBUTEROL SULFATE 2.5 MG: 2.5 SOLUTION RESPIRATORY (INHALATION) at 12:24

## 2019-01-01 RX ADMIN — ASPIRIN 81 MG 81 MG: 81 TABLET ORAL at 08:05

## 2019-01-01 RX ADMIN — SUCRALFATE 1 G: 1 TABLET ORAL at 21:04

## 2019-01-01 RX ADMIN — SODIUM CHLORIDE, PRESERVATIVE FREE 10 ML: 5 INJECTION INTRAVENOUS at 09:15

## 2019-01-01 RX ADMIN — AZITHROMYCIN DIHYDRATE 250 MG: 500 INJECTION, POWDER, LYOPHILIZED, FOR SOLUTION INTRAVENOUS at 16:39

## 2019-01-01 RX ADMIN — GUAIFENESIN AND DEXTROMETHORPHAN 10 ML: 100; 10 SYRUP ORAL at 14:58

## 2019-01-01 RX ADMIN — Medication 500 MG: at 07:36

## 2019-01-01 RX ADMIN — GUAIFENESIN AND DEXTROMETHORPHAN 10 ML: 100; 10 SYRUP ORAL at 09:49

## 2019-01-01 RX ADMIN — ALBUTEROL SULFATE 2.5 MG: 2.5 SOLUTION RESPIRATORY (INHALATION) at 12:49

## 2019-01-01 RX ADMIN — SUCRALFATE 1 G: 1 TABLET ORAL at 10:59

## 2019-01-01 RX ADMIN — POTASSIUM CHLORIDE 10 MEQ: 20 TABLET, EXTENDED RELEASE ORAL at 21:06

## 2019-01-01 RX ADMIN — METOPROLOL TARTRATE 25 MG: 25 TABLET ORAL at 21:23

## 2019-01-01 RX ADMIN — SUCRALFATE 1 G: 1 TABLET ORAL at 17:33

## 2019-01-01 RX ADMIN — SUCRALFATE 1 G: 1 TABLET ORAL at 06:26

## 2019-01-01 RX ADMIN — Medication 500 MG: at 10:00

## 2019-01-01 RX ADMIN — AMLODIPINE BESYLATE 5 MG: 5 TABLET ORAL at 10:01

## 2019-01-01 RX ADMIN — BENZONATATE 100 MG: 100 CAPSULE ORAL at 20:44

## 2019-01-01 RX ADMIN — VITAMIN D, TAB 1000IU (100/BT) 1000 UNITS: 25 TAB at 08:22

## 2019-01-01 RX ADMIN — ISOSORBIDE MONONITRATE 30 MG: 30 TABLET ORAL at 08:49

## 2019-01-01 RX ADMIN — ATORVASTATIN CALCIUM 40 MG: 40 TABLET, FILM COATED ORAL at 19:44

## 2019-01-01 RX ADMIN — SUCRALFATE 1 G: 1 TABLET ORAL at 07:57

## 2019-01-01 RX ADMIN — DOCUSATE SODIUM 100 MG: 100 CAPSULE, LIQUID FILLED ORAL at 08:21

## 2019-01-01 RX ADMIN — LEVOTHYROXINE SODIUM 100 MCG: 0.1 TABLET ORAL at 06:42

## 2019-01-01 RX ADMIN — SUCRALFATE 1 G: 1 TABLET ORAL at 22:29

## 2019-01-01 RX ADMIN — VITAMIN D, TAB 1000IU (100/BT) 1000 UNITS: 25 TAB at 09:03

## 2019-01-01 RX ADMIN — VITAMIN D, TAB 1000IU (100/BT) 1000 UNITS: 25 TAB at 11:58

## 2019-01-01 RX ADMIN — ONDANSETRON 4 MG: 4 TABLET, ORALLY DISINTEGRATING ORAL at 04:40

## 2019-01-01 RX ADMIN — BUMETANIDE 2 MG: 1 TABLET ORAL at 21:00

## 2019-01-01 RX ADMIN — LEVOTHYROXINE SODIUM 88 MCG: 88 TABLET ORAL at 08:24

## 2019-01-01 RX ADMIN — ALBUTEROL SULFATE 2.5 MG: 2.5 SOLUTION RESPIRATORY (INHALATION) at 08:37

## 2019-01-01 RX ADMIN — POTASSIUM CHLORIDE 10 MEQ: 20 TABLET, EXTENDED RELEASE ORAL at 09:31

## 2019-01-01 RX ADMIN — BUMETANIDE 1 MG: 1 TABLET ORAL at 07:51

## 2019-01-01 RX ADMIN — ALPRAZOLAM 0.25 MG: 0.25 TABLET ORAL at 11:57

## 2019-01-01 RX ADMIN — ALBUTEROL SULFATE 2.5 MG: 2.5 SOLUTION RESPIRATORY (INHALATION) at 11:39

## 2019-01-01 RX ADMIN — BENZONATATE 100 MG: 100 CAPSULE ORAL at 16:33

## 2019-01-01 RX ADMIN — CLOPIDOGREL BISULFATE 75 MG: 75 TABLET ORAL at 09:32

## 2019-01-01 RX ADMIN — PANTOPRAZOLE SODIUM 40 MG: 40 TABLET, DELAYED RELEASE ORAL at 06:42

## 2019-01-01 RX ADMIN — ALPRAZOLAM 0.25 MG: 0.25 TABLET ORAL at 05:31

## 2019-01-01 RX ADMIN — VITAMIN D, TAB 1000IU (100/BT) 1000 UNITS: 25 TAB at 09:13

## 2019-01-01 RX ADMIN — BENZONATATE 100 MG: 100 CAPSULE ORAL at 10:40

## 2019-01-01 RX ADMIN — RANOLAZINE 500 MG: 500 TABLET, FILM COATED, EXTENDED RELEASE ORAL at 21:00

## 2019-01-01 RX ADMIN — Medication 500 MG: at 11:57

## 2019-01-01 RX ADMIN — PANTOPRAZOLE SODIUM 40 MG: 40 TABLET, DELAYED RELEASE ORAL at 05:02

## 2019-01-01 RX ADMIN — ISOSORBIDE MONONITRATE 30 MG: 30 TABLET ORAL at 20:38

## 2019-01-01 RX ADMIN — RANOLAZINE 500 MG: 500 TABLET, FILM COATED, EXTENDED RELEASE ORAL at 08:21

## 2019-01-01 RX ADMIN — ALBUTEROL SULFATE 2.5 MG: 2.5 SOLUTION RESPIRATORY (INHALATION) at 20:14

## 2019-01-01 RX ADMIN — SUCRALFATE 1 G: 1 TABLET ORAL at 13:10

## 2019-01-01 RX ADMIN — ALPRAZOLAM 0.25 MG: 0.25 TABLET ORAL at 23:55

## 2019-01-01 RX ADMIN — METOPROLOL TARTRATE 25 MG: 25 TABLET ORAL at 16:27

## 2019-01-01 RX ADMIN — ISOSORBIDE MONONITRATE 60 MG: 60 TABLET ORAL at 10:40

## 2019-01-01 RX ADMIN — LEVOTHYROXINE SODIUM 88 MCG: 88 TABLET ORAL at 05:47

## 2019-01-01 RX ADMIN — Medication 10 ML: at 08:07

## 2019-01-01 RX ADMIN — ALBUTEROL SULFATE 2.5 MG: 2.5 SOLUTION RESPIRATORY (INHALATION) at 17:38

## 2019-01-01 RX ADMIN — ISOSORBIDE MONONITRATE 30 MG: 30 TABLET ORAL at 08:52

## 2019-01-01 RX ADMIN — PANTOPRAZOLE SODIUM 40 MG: 40 TABLET, DELAYED RELEASE ORAL at 04:28

## 2019-01-01 RX ADMIN — GUAIFENESIN AND DEXTROMETHORPHAN 10 ML: 100; 10 SYRUP ORAL at 14:54

## 2019-01-01 RX ADMIN — RANOLAZINE 500 MG: 500 TABLET, FILM COATED, EXTENDED RELEASE ORAL at 20:02

## 2019-01-01 RX ADMIN — CEFTRIAXONE SODIUM 1 G: 1 INJECTION, POWDER, FOR SOLUTION INTRAMUSCULAR; INTRAVENOUS at 16:33

## 2019-01-01 RX ADMIN — Medication 500 MG: at 08:23

## 2019-01-01 RX ADMIN — LEVOTHYROXINE SODIUM 88 MCG: 88 TABLET ORAL at 06:05

## 2019-01-01 RX ADMIN — BUMETANIDE 1 MG: 1 TABLET ORAL at 09:51

## 2019-01-01 RX ADMIN — LEVOTHYROXINE SODIUM 88 MCG: 88 TABLET ORAL at 08:13

## 2019-01-01 RX ADMIN — BENZONATATE 100 MG: 100 CAPSULE ORAL at 09:45

## 2019-01-01 RX ADMIN — ALPRAZOLAM 0.25 MG: 0.25 TABLET ORAL at 10:00

## 2019-01-01 RX ADMIN — Medication 28.8 MILLICURIE: at 11:00

## 2019-01-01 RX ADMIN — POTASSIUM CHLORIDE 10 MEQ: 20 TABLET, EXTENDED RELEASE ORAL at 10:04

## 2019-01-01 RX ADMIN — LEVOTHYROXINE SODIUM 100 MCG: 0.1 TABLET ORAL at 04:30

## 2019-01-01 RX ADMIN — METOPROLOL TARTRATE 25 MG: 25 TABLET ORAL at 10:03

## 2019-01-01 RX ADMIN — SUCRALFATE 1 G: 1 TABLET ORAL at 10:40

## 2019-01-01 RX ADMIN — PANTOPRAZOLE SODIUM 40 MG: 40 TABLET, DELAYED RELEASE ORAL at 05:05

## 2019-01-01 RX ADMIN — FLUTICASONE PROPIONATE 2 SPRAY: 50 SPRAY, METERED NASAL at 08:10

## 2019-01-01 RX ADMIN — Medication 500 MG: at 08:30

## 2019-01-01 RX ADMIN — METOPROLOL TARTRATE 25 MG: 25 TABLET ORAL at 08:20

## 2019-01-01 RX ADMIN — POTASSIUM CHLORIDE 10 MEQ: 750 TABLET, EXTENDED RELEASE ORAL at 07:51

## 2019-01-01 RX ADMIN — ISOSORBIDE MONONITRATE 30 MG: 30 TABLET ORAL at 08:11

## 2019-01-01 RX ADMIN — BENZONATATE 100 MG: 100 CAPSULE ORAL at 03:52

## 2019-01-01 RX ADMIN — FUROSEMIDE 20 MG: 40 INJECTION, SOLUTION INTRAMUSCULAR; INTRAVENOUS at 14:43

## 2019-01-01 RX ADMIN — IOPAMIDOL 80 ML: 755 INJECTION, SOLUTION INTRAVENOUS at 19:48

## 2019-01-01 RX ADMIN — ANTACID TABLETS 500 MG: 500 TABLET, CHEWABLE ORAL at 17:03

## 2019-01-01 RX ADMIN — IPRATROPIUM BROMIDE AND ALBUTEROL SULFATE 1 AMPULE: .5; 3 SOLUTION RESPIRATORY (INHALATION) at 16:23

## 2019-01-01 RX ADMIN — BENZONATATE 100 MG: 100 CAPSULE ORAL at 22:04

## 2019-01-01 RX ADMIN — ALBUTEROL SULFATE 2.5 MG: 2.5 SOLUTION RESPIRATORY (INHALATION) at 10:04

## 2019-01-01 RX ADMIN — BENZONATATE 100 MG: 100 CAPSULE ORAL at 12:56

## 2019-01-01 RX ADMIN — ONDANSETRON 4 MG: 4 TABLET, ORALLY DISINTEGRATING ORAL at 14:17

## 2019-01-01 RX ADMIN — BENZONATATE 100 MG: 100 CAPSULE ORAL at 14:32

## 2019-01-01 RX ADMIN — GUAIFENESIN AND DEXTROMETHORPHAN 10 ML: 100; 10 SYRUP ORAL at 17:28

## 2019-01-01 RX ADMIN — SUCRALFATE 1 G: 1 TABLET ORAL at 00:14

## 2019-01-01 RX ADMIN — SODIUM CHLORIDE, PRESERVATIVE FREE 10 ML: 5 INJECTION INTRAVENOUS at 21:05

## 2019-01-01 RX ADMIN — IPRATROPIUM BROMIDE AND ALBUTEROL SULFATE 1 AMPULE: .5; 3 SOLUTION RESPIRATORY (INHALATION) at 17:47

## 2019-01-01 RX ADMIN — IPRATROPIUM BROMIDE AND ALBUTEROL SULFATE 1 AMPULE: .5; 3 SOLUTION RESPIRATORY (INHALATION) at 21:26

## 2019-01-01 RX ADMIN — ACETAMINOPHEN 650 MG: 325 TABLET ORAL at 08:13

## 2019-01-01 RX ADMIN — ALBUTEROL SULFATE 2.5 MG: 2.5 SOLUTION RESPIRATORY (INHALATION) at 13:36

## 2019-01-01 RX ADMIN — DOXYCYCLINE HYCLATE 100 MG: 100 TABLET, COATED ORAL at 10:59

## 2019-01-01 RX ADMIN — METOPROLOL TARTRATE 25 MG: 25 TABLET ORAL at 10:40

## 2019-01-01 RX ADMIN — SUCRALFATE 1 G: 1 TABLET ORAL at 08:21

## 2019-01-01 RX ADMIN — SUCRALFATE 1 G: 1 TABLET ORAL at 11:23

## 2019-01-01 RX ADMIN — ALBUTEROL SULFATE 2.5 MG: 2.5 SOLUTION RESPIRATORY (INHALATION) at 21:21

## 2019-01-01 RX ADMIN — Medication 10 ML: at 22:22

## 2019-01-01 RX ADMIN — BUMETANIDE 1 MG: 1 TABLET ORAL at 14:58

## 2019-01-01 RX ADMIN — ONDANSETRON 4 MG: 2 INJECTION INTRAMUSCULAR; INTRAVENOUS at 08:16

## 2019-01-01 RX ADMIN — SUCRALFATE 1 G: 1 TABLET ORAL at 15:55

## 2019-01-01 RX ADMIN — LEVOTHYROXINE SODIUM 88 MCG: 88 TABLET ORAL at 05:02

## 2019-01-01 RX ADMIN — BENZONATATE 100 MG: 100 CAPSULE ORAL at 15:08

## 2019-01-01 RX ADMIN — PANTOPRAZOLE SODIUM 40 MG: 40 TABLET, DELAYED RELEASE ORAL at 16:32

## 2019-01-01 RX ADMIN — SUCRALFATE 1 G: 1 TABLET ORAL at 20:44

## 2019-01-01 RX ADMIN — Medication 10 ML: at 08:11

## 2019-01-01 RX ADMIN — Medication 500 MG: at 09:20

## 2019-01-01 RX ADMIN — BENZONATATE 100 MG: 100 CAPSULE ORAL at 00:33

## 2019-01-01 RX ADMIN — CLOPIDOGREL BISULFATE 75 MG: 75 TABLET ORAL at 10:13

## 2019-01-01 RX ADMIN — ISOSORBIDE MONONITRATE 30 MG: 30 TABLET ORAL at 08:36

## 2019-01-01 RX ADMIN — BUMETANIDE 1 MG: 1 TABLET ORAL at 16:51

## 2019-01-01 RX ADMIN — METOPROLOL TARTRATE 25 MG: 25 TABLET ORAL at 21:06

## 2019-01-01 RX ADMIN — LEVOTHYROXINE SODIUM 88 MCG: 88 TABLET ORAL at 08:31

## 2019-01-01 RX ADMIN — ALPRAZOLAM 0.25 MG: 0.5 TABLET ORAL at 01:27

## 2019-01-01 RX ADMIN — ALBUTEROL SULFATE 2.5 MG: 2.5 SOLUTION RESPIRATORY (INHALATION) at 11:19

## 2019-01-01 RX ADMIN — ALBUTEROL SULFATE 2.5 MG: 2.5 SOLUTION RESPIRATORY (INHALATION) at 17:32

## 2019-01-01 RX ADMIN — ONDANSETRON 4 MG: 2 INJECTION INTRAMUSCULAR; INTRAVENOUS at 14:57

## 2019-01-01 RX ADMIN — SUCRALFATE 1 G: 1 TABLET ORAL at 21:30

## 2019-01-01 RX ADMIN — SUCRALFATE 1 G: 1 TABLET ORAL at 06:05

## 2019-01-01 RX ADMIN — Medication 500 MG: at 09:03

## 2019-01-01 RX ADMIN — ISOSORBIDE MONONITRATE 30 MG: 30 TABLET ORAL at 08:04

## 2019-01-01 RX ADMIN — IPRATROPIUM BROMIDE AND ALBUTEROL SULFATE 1 AMPULE: .5; 3 SOLUTION RESPIRATORY (INHALATION) at 17:19

## 2019-01-01 RX ADMIN — ALPRAZOLAM 0.25 MG: 0.25 TABLET ORAL at 08:48

## 2019-01-01 RX ADMIN — PANTOPRAZOLE SODIUM 40 MG: 40 TABLET, DELAYED RELEASE ORAL at 06:18

## 2019-01-01 RX ADMIN — DOXYCYCLINE HYCLATE 100 MG: 100 TABLET, COATED ORAL at 21:06

## 2019-01-01 RX ADMIN — BUMETANIDE 1 MG: 1 TABLET ORAL at 08:21

## 2019-01-01 RX ADMIN — Medication 10 ML: at 19:45

## 2019-01-01 RX ADMIN — DOCUSATE SODIUM 100 MG: 100 CAPSULE, LIQUID FILLED ORAL at 10:38

## 2019-01-01 RX ADMIN — AMLODIPINE BESYLATE 5 MG: 5 TABLET ORAL at 08:31

## 2019-01-01 RX ADMIN — SUCRALFATE 1 G: 1 TABLET ORAL at 10:36

## 2019-01-01 RX ADMIN — BUMETANIDE 1 MG: 1 TABLET ORAL at 08:31

## 2019-01-01 RX ADMIN — SUCRALFATE 1 G: 1 TABLET ORAL at 12:56

## 2019-01-01 RX ADMIN — ENOXAPARIN SODIUM 80 MG: 80 INJECTION SUBCUTANEOUS at 04:28

## 2019-01-01 RX ADMIN — ACETAMINOPHEN 650 MG: 325 TABLET ORAL at 21:25

## 2019-01-01 RX ADMIN — BENZONATATE 100 MG: 100 CAPSULE ORAL at 20:52

## 2019-01-01 RX ADMIN — LEVOTHYROXINE SODIUM 88 MCG: 88 TABLET ORAL at 06:39

## 2019-01-01 RX ADMIN — METOPROLOL TARTRATE 25 MG: 25 TABLET ORAL at 21:25

## 2019-01-01 RX ADMIN — IPRATROPIUM BROMIDE AND ALBUTEROL SULFATE 1 AMPULE: .5; 3 SOLUTION RESPIRATORY (INHALATION) at 12:19

## 2019-01-01 RX ADMIN — ASPIRIN 81 MG 81 MG: 81 TABLET ORAL at 10:43

## 2019-01-01 RX ADMIN — BUMETANIDE 1 MG: 0.25 INJECTION INTRAMUSCULAR; INTRAVENOUS at 19:57

## 2019-01-01 RX ADMIN — ASPIRIN 81 MG 81 MG: 81 TABLET ORAL at 08:11

## 2019-01-01 RX ADMIN — POLYETHYLENE GLYCOL 3350 238 G: 17 POWDER, FOR SOLUTION ORAL at 16:32

## 2019-01-01 RX ADMIN — ALBUTEROL SULFATE 2.5 MG: 2.5 SOLUTION RESPIRATORY (INHALATION) at 13:30

## 2019-01-01 RX ADMIN — DOXYCYCLINE HYCLATE 100 MG: 100 TABLET, COATED ORAL at 21:25

## 2019-01-01 RX ADMIN — SUCRALFATE 1 G: 1 TABLET ORAL at 04:28

## 2019-01-01 RX ADMIN — LEVOTHYROXINE SODIUM 88 MCG: 88 TABLET ORAL at 06:23

## 2019-01-01 RX ADMIN — SUCRALFATE 1 G: 1 TABLET ORAL at 11:57

## 2019-01-01 RX ADMIN — ALBUTEROL SULFATE 2.5 MG: 2.5 SOLUTION RESPIRATORY (INHALATION) at 20:08

## 2019-01-01 RX ADMIN — SUCRALFATE 1 G: 1 TABLET ORAL at 08:24

## 2019-01-01 RX ADMIN — POTASSIUM CHLORIDE 10 MEQ: 20 TABLET, EXTENDED RELEASE ORAL at 20:38

## 2019-01-01 RX ADMIN — BUMETANIDE 1 MG: 1 TABLET ORAL at 08:04

## 2019-01-01 RX ADMIN — POTASSIUM CHLORIDE 10 MEQ: 750 TABLET, FILM COATED, EXTENDED RELEASE ORAL at 08:09

## 2019-01-01 RX ADMIN — MEROPENEM 1 G: 1 INJECTION, POWDER, FOR SOLUTION INTRAVENOUS at 08:33

## 2019-01-01 RX ADMIN — VITAMIN D, TAB 1000IU (100/BT) 1000 UNITS: 25 TAB at 12:23

## 2019-01-01 RX ADMIN — POTASSIUM CHLORIDE 10 MEQ: 750 TABLET, EXTENDED RELEASE ORAL at 12:23

## 2019-01-01 RX ADMIN — ALBUTEROL SULFATE 2.5 MG: 2.5 SOLUTION RESPIRATORY (INHALATION) at 15:57

## 2019-01-01 RX ADMIN — AMLODIPINE BESYLATE 5 MG: 5 TABLET ORAL at 08:52

## 2019-01-01 RX ADMIN — ACETAMINOPHEN 650 MG: 325 TABLET ORAL at 15:59

## 2019-01-01 RX ADMIN — SODIUM CHLORIDE TAB 1 GM 2 G: 1 TAB at 09:00

## 2019-01-01 RX ADMIN — LEVOTHYROXINE SODIUM 88 MCG: 88 TABLET ORAL at 04:28

## 2019-01-01 RX ADMIN — HEPARIN SODIUM 5000 UNITS: 5000 INJECTION INTRAVENOUS; SUBCUTANEOUS at 19:04

## 2019-01-01 RX ADMIN — METOPROLOL TARTRATE 25 MG: 25 TABLET ORAL at 12:23

## 2019-01-01 RX ADMIN — PANTOPRAZOLE SODIUM 40 MG: 40 TABLET, DELAYED RELEASE ORAL at 08:30

## 2019-01-01 RX ADMIN — SUCRALFATE 1 G: 1 TABLET ORAL at 20:14

## 2019-01-01 RX ADMIN — SUCRALFATE 1 G: 1 TABLET ORAL at 11:04

## 2019-01-01 RX ADMIN — ISOSORBIDE MONONITRATE 30 MG: 30 TABLET ORAL at 08:22

## 2019-01-01 RX ADMIN — ISOSORBIDE MONONITRATE 30 MG: 30 TABLET ORAL at 08:21

## 2019-01-01 RX ADMIN — METOPROLOL TARTRATE 25 MG: 25 TABLET ORAL at 22:27

## 2019-01-01 RX ADMIN — CEFTRIAXONE SODIUM 1 G: 1 INJECTION, POWDER, FOR SOLUTION INTRAMUSCULAR; INTRAVENOUS at 16:06

## 2019-01-01 RX ADMIN — LEVOTHYROXINE SODIUM 88 MCG: 88 TABLET ORAL at 09:13

## 2019-01-01 RX ADMIN — SUCRALFATE 1 G: 1 TABLET ORAL at 11:43

## 2019-01-01 RX ADMIN — Medication 500 MG: at 08:22

## 2019-01-01 RX ADMIN — BENZONATATE 100 MG: 100 CAPSULE ORAL at 18:06

## 2019-01-01 RX ADMIN — AZITHROMYCIN DIHYDRATE 250 MG: 500 INJECTION, POWDER, LYOPHILIZED, FOR SOLUTION INTRAVENOUS at 16:52

## 2019-01-01 RX ADMIN — PANTOPRAZOLE SODIUM 40 MG: 40 TABLET, DELAYED RELEASE ORAL at 05:50

## 2019-01-01 RX ADMIN — ALBUTEROL SULFATE 2.5 MG: 2.5 SOLUTION RESPIRATORY (INHALATION) at 09:54

## 2019-01-01 RX ADMIN — ALBUTEROL SULFATE 2.5 MG: 2.5 SOLUTION RESPIRATORY (INHALATION) at 17:37

## 2019-01-01 RX ADMIN — CEFTRIAXONE SODIUM 1 G: 1 INJECTION, POWDER, FOR SOLUTION INTRAMUSCULAR; INTRAVENOUS at 16:25

## 2019-01-01 RX ADMIN — ALPRAZOLAM 0.25 MG: 0.25 TABLET ORAL at 20:32

## 2019-01-01 RX ADMIN — PANTOPRAZOLE SODIUM 40 MG: 40 TABLET, DELAYED RELEASE ORAL at 17:24

## 2019-01-01 RX ADMIN — POTASSIUM CHLORIDE 10 MEQ: 20 TABLET, EXTENDED RELEASE ORAL at 21:37

## 2019-01-01 RX ADMIN — HEPARIN SODIUM 5000 UNITS: 5000 INJECTION INTRAVENOUS; SUBCUTANEOUS at 06:55

## 2019-01-01 RX ADMIN — PANTOPRAZOLE SODIUM 40 MG: 40 TABLET, DELAYED RELEASE ORAL at 05:58

## 2019-01-01 RX ADMIN — NITROGLYCERIN 0.4 MG: 0.4 TABLET, ORALLY DISINTEGRATING SUBLINGUAL at 11:53

## 2019-01-01 RX ADMIN — DOXYCYCLINE HYCLATE 100 MG: 100 TABLET, COATED ORAL at 20:38

## 2019-01-01 RX ADMIN — SUCRALFATE 1 G: 1 TABLET ORAL at 05:58

## 2019-01-01 RX ADMIN — BUMETANIDE 1 MG: 1 TABLET ORAL at 08:08

## 2019-01-01 RX ADMIN — Medication 500 MG: at 10:03

## 2019-01-01 RX ADMIN — ALBUTEROL SULFATE 2.5 MG: 2.5 SOLUTION RESPIRATORY (INHALATION) at 19:53

## 2019-01-01 RX ADMIN — Medication 10 ML: at 21:37

## 2019-01-01 RX ADMIN — VITAMIN D, TAB 1000IU (100/BT) 1000 UNITS: 25 TAB at 08:11

## 2019-01-01 RX ADMIN — AMLODIPINE BESYLATE 5 MG: 5 TABLET ORAL at 08:07

## 2019-01-01 RX ADMIN — DOXYCYCLINE HYCLATE 100 MG: 100 TABLET, COATED ORAL at 07:48

## 2019-01-01 RX ADMIN — SUCRALFATE 1 G: 1 TABLET ORAL at 21:36

## 2019-01-01 RX ADMIN — ALBUTEROL SULFATE 2.5 MG: 2.5 SOLUTION RESPIRATORY (INHALATION) at 20:31

## 2019-01-01 RX ADMIN — Medication 10 ML: at 21:23

## 2019-01-01 RX ADMIN — SUCRALFATE 1 G: 1 TABLET ORAL at 22:21

## 2019-01-01 RX ADMIN — ALBUTEROL SULFATE 2.5 MG: 2.5 SOLUTION RESPIRATORY (INHALATION) at 12:27

## 2019-01-01 RX ADMIN — POLYETHYLENE GLYCOL 3350 17 G: 17 POWDER, FOR SOLUTION ORAL at 10:04

## 2019-01-01 RX ADMIN — SUCRALFATE 1 G: 1 TABLET ORAL at 19:44

## 2019-01-01 RX ADMIN — ISOSORBIDE MONONITRATE 60 MG: 60 TABLET ORAL at 07:48

## 2019-01-01 RX ADMIN — METOPROLOL TARTRATE 25 MG: 25 TABLET ORAL at 09:50

## 2019-01-01 RX ADMIN — ALBUTEROL SULFATE 2.5 MG: 2.5 SOLUTION RESPIRATORY (INHALATION) at 16:07

## 2019-01-01 RX ADMIN — SUCRALFATE 1 G: 1 TABLET ORAL at 19:56

## 2019-01-01 RX ADMIN — VITAMIN D, TAB 1000IU (100/BT) 1000 UNITS: 25 TAB at 10:04

## 2019-01-01 RX ADMIN — LEVOTHYROXINE SODIUM 88 MCG: 88 TABLET ORAL at 06:37

## 2019-01-01 RX ADMIN — SUCRALFATE 1 G: 1 TABLET ORAL at 10:04

## 2019-01-01 RX ADMIN — SUCRALFATE 1 G: 1 TABLET ORAL at 17:18

## 2019-01-01 RX ADMIN — ALBUTEROL SULFATE 2.5 MG: 2.5 SOLUTION RESPIRATORY (INHALATION) at 12:47

## 2019-01-01 RX ADMIN — CLOPIDOGREL BISULFATE 75 MG: 75 TABLET ORAL at 07:55

## 2019-01-01 RX ADMIN — BUMETANIDE 2 MG: 1 TABLET ORAL at 07:56

## 2019-01-01 RX ADMIN — METOPROLOL TARTRATE 25 MG: 25 TABLET ORAL at 20:32

## 2019-01-01 RX ADMIN — POLYETHYLENE GLYCOL 3350 17 G: 17 POWDER, FOR SOLUTION ORAL at 09:32

## 2019-01-01 RX ADMIN — VITAMIN D, TAB 1000IU (100/BT) 1000 UNITS: 25 TAB at 07:36

## 2019-01-01 RX ADMIN — SUCRALFATE 1 G: 1 TABLET ORAL at 21:44

## 2019-01-01 RX ADMIN — PANTOPRAZOLE SODIUM 40 MG: 40 TABLET, DELAYED RELEASE ORAL at 05:47

## 2019-01-01 RX ADMIN — IPRATROPIUM BROMIDE AND ALBUTEROL SULFATE 1 AMPULE: .5; 3 SOLUTION RESPIRATORY (INHALATION) at 07:48

## 2019-01-01 RX ADMIN — POTASSIUM CHLORIDE 10 MEQ: 750 TABLET, EXTENDED RELEASE ORAL at 08:21

## 2019-01-01 RX ADMIN — ALBUTEROL SULFATE 2.5 MG: 2.5 SOLUTION RESPIRATORY (INHALATION) at 15:33

## 2019-01-01 RX ADMIN — GUAIFENESIN AND DEXTROMETHORPHAN 15 ML: 100; 10 SYRUP ORAL at 09:58

## 2019-01-01 RX ADMIN — BUMETANIDE 2 MG: 0.25 INJECTION INTRAMUSCULAR; INTRAVENOUS at 21:04

## 2019-01-01 RX ADMIN — POTASSIUM CHLORIDE 10 MEQ: 750 TABLET, EXTENDED RELEASE ORAL at 11:57

## 2019-01-01 RX ADMIN — MEROPENEM 1 G: 1 INJECTION, POWDER, FOR SOLUTION INTRAVENOUS at 01:22

## 2019-01-01 RX ADMIN — BUMETANIDE 2 MG: 1 TABLET ORAL at 20:44

## 2019-01-01 RX ADMIN — BENZONATATE 100 MG: 100 CAPSULE ORAL at 15:00

## 2019-01-01 RX ADMIN — HEPARIN SODIUM 5000 UNITS: 5000 INJECTION INTRAVENOUS; SUBCUTANEOUS at 05:48

## 2019-01-01 RX ADMIN — METOPROLOL TARTRATE 25 MG: 25 TABLET ORAL at 09:03

## 2019-01-01 RX ADMIN — AMLODIPINE BESYLATE 5 MG: 5 TABLET ORAL at 07:52

## 2019-01-01 RX ADMIN — SODIUM CHLORIDE, PRESERVATIVE FREE 10 ML: 5 INJECTION INTRAVENOUS at 08:38

## 2019-01-01 RX ADMIN — DOXYCYCLINE HYCLATE 100 MG: 100 TABLET, COATED ORAL at 07:52

## 2019-01-01 RX ADMIN — SUCRALFATE 1 G: 1 TABLET ORAL at 17:07

## 2019-01-01 RX ADMIN — BENZONATATE 100 MG: 100 CAPSULE ORAL at 14:26

## 2019-01-01 RX ADMIN — POTASSIUM CHLORIDE 10 MEQ: 750 TABLET, EXTENDED RELEASE ORAL at 08:13

## 2019-01-01 RX ADMIN — ONDANSETRON 4 MG: 2 INJECTION INTRAMUSCULAR; INTRAVENOUS at 10:04

## 2019-01-01 RX ADMIN — AMLODIPINE BESYLATE 5 MG: 5 TABLET ORAL at 07:57

## 2019-01-01 RX ADMIN — PANTOPRAZOLE SODIUM 40 MG: 40 TABLET, DELAYED RELEASE ORAL at 16:49

## 2019-01-01 RX ADMIN — CEFTRIAXONE SODIUM 1 G: 1 INJECTION, POWDER, FOR SOLUTION INTRAMUSCULAR; INTRAVENOUS at 16:29

## 2019-01-01 RX ADMIN — VITAMIN D, TAB 1000IU (100/BT) 1000 UNITS: 25 TAB at 08:13

## 2019-01-01 RX ADMIN — ALBUTEROL SULFATE 2.5 MG: 2.5 SOLUTION RESPIRATORY (INHALATION) at 17:36

## 2019-01-01 RX ADMIN — ALBUTEROL SULFATE 2.5 MG: 2.5 SOLUTION RESPIRATORY (INHALATION) at 13:44

## 2019-01-01 RX ADMIN — AMLODIPINE BESYLATE 5 MG: 5 TABLET ORAL at 12:23

## 2019-01-01 RX ADMIN — SUCRALFATE 1 G: 1 TABLET ORAL at 11:26

## 2019-01-01 RX ADMIN — PANTOPRAZOLE SODIUM 40 MG: 40 TABLET, DELAYED RELEASE ORAL at 06:26

## 2019-01-01 RX ADMIN — AZITHROMYCIN DIHYDRATE 250 MG: 500 INJECTION, POWDER, LYOPHILIZED, FOR SOLUTION INTRAVENOUS at 17:44

## 2019-01-01 RX ADMIN — BENZONATATE 100 MG: 100 CAPSULE ORAL at 13:06

## 2019-01-01 RX ADMIN — SODIUM CHLORIDE, PRESERVATIVE FREE 10 ML: 5 INJECTION INTRAVENOUS at 20:33

## 2019-01-01 RX ADMIN — POTASSIUM CHLORIDE 10 MEQ: 20 TABLET, EXTENDED RELEASE ORAL at 09:49

## 2019-01-01 RX ADMIN — ALBUTEROL SULFATE 2.5 MG: 2.5 SOLUTION RESPIRATORY (INHALATION) at 08:49

## 2019-01-01 RX ADMIN — DOXYCYCLINE HYCLATE 100 MG: 100 TABLET, COATED ORAL at 20:26

## 2019-01-01 RX ADMIN — Medication 10 ML: at 21:42

## 2019-01-01 RX ADMIN — Medication 10 ML: at 08:05

## 2019-01-01 RX ADMIN — ISOSORBIDE MONONITRATE 30 MG: 30 TABLET ORAL at 12:23

## 2019-01-01 RX ADMIN — ALPRAZOLAM 0.25 MG: 0.5 TABLET ORAL at 12:02

## 2019-01-01 RX ADMIN — POTASSIUM CHLORIDE 10 MEQ: 20 TABLET, EXTENDED RELEASE ORAL at 19:59

## 2019-01-01 RX ADMIN — LEVOTHYROXINE SODIUM 88 MCG: 88 TABLET ORAL at 04:30

## 2019-01-01 RX ADMIN — Medication 5 UNITS: at 20:45

## 2019-01-01 RX ADMIN — SUCRALFATE 1 G: 1 TABLET ORAL at 16:09

## 2019-01-01 RX ADMIN — METOPROLOL TARTRATE 25 MG: 25 TABLET ORAL at 21:37

## 2019-01-01 RX ADMIN — ACETAMINOPHEN 650 MG: 325 TABLET ORAL at 17:50

## 2019-01-01 RX ADMIN — RANOLAZINE 500 MG: 500 TABLET, FILM COATED, EXTENDED RELEASE ORAL at 07:56

## 2019-01-01 RX ADMIN — ALBUTEROL SULFATE 2.5 MG: 2.5 SOLUTION RESPIRATORY (INHALATION) at 16:56

## 2019-01-01 RX ADMIN — Medication 2 PUFF: at 08:49

## 2019-01-01 RX ADMIN — ACETAMINOPHEN 650 MG: 325 TABLET ORAL at 17:15

## 2019-01-01 RX ADMIN — CEFTRIAXONE SODIUM 1 G: 1 INJECTION, POWDER, FOR SOLUTION INTRAMUSCULAR; INTRAVENOUS at 16:09

## 2019-01-01 RX ADMIN — FLUTICASONE PROPIONATE 2 SPRAY: 50 SPRAY, METERED NASAL at 08:26

## 2019-01-01 RX ADMIN — CLOPIDOGREL BISULFATE 75 MG: 75 TABLET ORAL at 10:38

## 2019-01-01 RX ADMIN — METOPROLOL TARTRATE 25 MG: 25 TABLET ORAL at 07:48

## 2019-01-01 RX ADMIN — BENZONATATE 100 MG: 100 CAPSULE ORAL at 19:59

## 2019-01-01 RX ADMIN — ISOSORBIDE MONONITRATE 60 MG: 60 TABLET ORAL at 10:15

## 2019-01-01 RX ADMIN — RANOLAZINE 500 MG: 500 TABLET, FILM COATED, EXTENDED RELEASE ORAL at 19:59

## 2019-01-01 RX ADMIN — SENNOSIDES 129 MG: 8.6 TABLET, FILM COATED ORAL at 13:37

## 2019-01-01 RX ADMIN — ONDANSETRON 4 MG: 2 INJECTION INTRAMUSCULAR; INTRAVENOUS at 08:38

## 2019-01-01 RX ADMIN — IPRATROPIUM BROMIDE AND ALBUTEROL SULFATE 1 AMPULE: .5; 3 SOLUTION RESPIRATORY (INHALATION) at 16:03

## 2019-01-01 RX ADMIN — ALBUTEROL SULFATE 2.5 MG: 2.5 SOLUTION RESPIRATORY (INHALATION) at 18:02

## 2019-01-01 RX ADMIN — RANOLAZINE 500 MG: 500 TABLET, FILM COATED, EXTENDED RELEASE ORAL at 22:29

## 2019-01-01 RX ADMIN — SUCRALFATE 1 G: 1 TABLET ORAL at 20:32

## 2019-01-01 RX ADMIN — ENOXAPARIN SODIUM 80 MG: 80 INJECTION SUBCUTANEOUS at 05:05

## 2019-01-01 RX ADMIN — RANOLAZINE 500 MG: 500 TABLET, FILM COATED, EXTENDED RELEASE ORAL at 10:15

## 2019-01-01 RX ADMIN — VITAMIN D, TAB 1000IU (100/BT) 1000 UNITS: 25 TAB at 10:16

## 2019-01-01 RX ADMIN — SUCRALFATE 1 G: 1 TABLET ORAL at 13:36

## 2019-01-01 RX ADMIN — BENZONATATE 100 MG: 100 CAPSULE ORAL at 12:02

## 2019-01-01 RX ADMIN — BENZONATATE 100 MG: 100 CAPSULE ORAL at 10:45

## 2019-01-01 RX ADMIN — AMLODIPINE BESYLATE 5 MG: 5 TABLET ORAL at 10:03

## 2019-01-01 RX ADMIN — ISOSORBIDE MONONITRATE 60 MG: 60 TABLET ORAL at 07:57

## 2019-01-01 RX ADMIN — PANTOPRAZOLE SODIUM 40 MG: 40 TABLET, DELAYED RELEASE ORAL at 07:00

## 2019-01-01 RX ADMIN — POTASSIUM CHLORIDE 10 MEQ: 20 TABLET, EXTENDED RELEASE ORAL at 22:29

## 2019-01-01 RX ADMIN — METHYLPREDNISOLONE SODIUM SUCCINATE 40 MG: 40 INJECTION, POWDER, FOR SOLUTION INTRAMUSCULAR; INTRAVENOUS at 04:44

## 2019-01-01 RX ADMIN — SODIUM CHLORIDE TAB 1 GM 2 G: 1 TAB at 17:10

## 2019-01-01 RX ADMIN — RANOLAZINE 500 MG: 500 TABLET, FILM COATED, EXTENDED RELEASE ORAL at 10:40

## 2019-01-01 RX ADMIN — PANTOPRAZOLE SODIUM 40 MG: 40 TABLET, DELAYED RELEASE ORAL at 08:36

## 2019-01-01 RX ADMIN — ALBUTEROL SULFATE 2.5 MG: 2.5 SOLUTION RESPIRATORY (INHALATION) at 02:25

## 2019-01-01 RX ADMIN — BUMETANIDE 2 MG: 1 TABLET ORAL at 20:50

## 2019-01-01 RX ADMIN — LEVOTHYROXINE SODIUM 88 MCG: 88 TABLET ORAL at 05:13

## 2019-01-01 RX ADMIN — ALBUTEROL SULFATE 2.5 MG: 2.5 SOLUTION RESPIRATORY (INHALATION) at 06:14

## 2019-01-01 RX ADMIN — ACETAMINOPHEN 650 MG: 325 TABLET ORAL at 19:06

## 2019-01-01 RX ADMIN — AMLODIPINE BESYLATE 5 MG: 5 TABLET ORAL at 08:12

## 2019-01-01 RX ADMIN — ONDANSETRON 4 MG: 2 INJECTION INTRAMUSCULAR; INTRAVENOUS at 13:34

## 2019-01-01 RX ADMIN — ALPRAZOLAM 0.25 MG: 0.5 TABLET ORAL at 13:07

## 2019-01-01 RX ADMIN — PANTOPRAZOLE SODIUM 40 MG: 40 TABLET, DELAYED RELEASE ORAL at 11:57

## 2019-01-01 RX ADMIN — ENOXAPARIN SODIUM 80 MG: 80 INJECTION SUBCUTANEOUS at 16:51

## 2019-01-01 RX ADMIN — SUCRALFATE 1 G: 1 TABLET ORAL at 17:51

## 2019-01-01 RX ADMIN — GUAIFENESIN AND DEXTROMETHORPHAN 15 ML: 100; 10 SYRUP ORAL at 22:04

## 2019-01-01 RX ADMIN — ALBUTEROL SULFATE 2.5 MG: 2.5 SOLUTION RESPIRATORY (INHALATION) at 11:29

## 2019-01-01 RX ADMIN — BUMETANIDE 1 MG: 1 TABLET ORAL at 21:36

## 2019-01-01 RX ADMIN — METOPROLOL TARTRATE 25 MG: 25 TABLET ORAL at 20:38

## 2019-01-01 RX ADMIN — HEPARIN SODIUM 5000 UNITS: 5000 INJECTION INTRAVENOUS; SUBCUTANEOUS at 15:06

## 2019-01-01 RX ADMIN — CLOPIDOGREL BISULFATE 75 MG: 75 TABLET ORAL at 10:04

## 2019-01-01 RX ADMIN — ALPRAZOLAM 0.25 MG: 0.5 TABLET ORAL at 20:38

## 2019-01-01 RX ADMIN — RANOLAZINE 500 MG: 500 TABLET, FILM COATED, EXTENDED RELEASE ORAL at 20:23

## 2019-01-01 RX ADMIN — ALPRAZOLAM 0.25 MG: 0.5 TABLET ORAL at 08:11

## 2019-01-01 RX ADMIN — ALBUTEROL SULFATE 2.5 MG: 2.5 SOLUTION RESPIRATORY (INHALATION) at 16:43

## 2019-01-01 RX ADMIN — PANTOPRAZOLE SODIUM 40 MG: 40 TABLET, DELAYED RELEASE ORAL at 09:14

## 2019-01-01 RX ADMIN — MEROPENEM 1 G: 1 INJECTION, POWDER, FOR SOLUTION INTRAVENOUS at 19:04

## 2019-01-01 RX ADMIN — SUCRALFATE 1 G: 1 TABLET ORAL at 12:37

## 2019-01-01 RX ADMIN — SUCRALFATE 1 G: 1 TABLET ORAL at 04:27

## 2019-01-01 RX ADMIN — SUCRALFATE 1 G: 1 TABLET ORAL at 09:20

## 2019-01-01 RX ADMIN — Medication 2 PUFF: at 22:23

## 2019-01-01 RX ADMIN — BENZONATATE 100 MG: 100 CAPSULE ORAL at 17:54

## 2019-01-01 RX ADMIN — AMLODIPINE BESYLATE 5 MG: 5 TABLET ORAL at 08:04

## 2019-01-01 RX ADMIN — PANTOPRAZOLE SODIUM 40 MG: 40 TABLET, DELAYED RELEASE ORAL at 19:08

## 2019-01-01 RX ADMIN — GUAIFENESIN AND DEXTROMETHORPHAN 10 ML: 100; 10 SYRUP ORAL at 21:23

## 2019-01-01 RX ADMIN — DOCUSATE SODIUM 100 MG: 100 CAPSULE, LIQUID FILLED ORAL at 10:13

## 2019-01-01 RX ADMIN — POTASSIUM CHLORIDE 10 MEQ: 20 TABLET, EXTENDED RELEASE ORAL at 19:56

## 2019-01-01 RX ADMIN — GUAIFENESIN AND DEXTROMETHORPHAN 15 ML: 100; 10 SYRUP ORAL at 00:22

## 2019-01-01 RX ADMIN — ALBUTEROL SULFATE 2.5 MG: 2.5 SOLUTION RESPIRATORY (INHALATION) at 08:30

## 2019-01-01 RX ADMIN — ISOSORBIDE MONONITRATE 30 MG: 30 TABLET ORAL at 09:13

## 2019-01-01 RX ADMIN — POLYETHYLENE GLYCOL 3350 17 G: 17 POWDER, FOR SOLUTION ORAL at 07:57

## 2019-01-01 RX ADMIN — ISOSORBIDE MONONITRATE 30 MG: 30 TABLET ORAL at 08:13

## 2019-01-01 RX ADMIN — ALBUTEROL SULFATE 2.5 MG: 2.5 SOLUTION RESPIRATORY (INHALATION) at 13:03

## 2019-01-01 RX ADMIN — CLOPIDOGREL BISULFATE 75 MG: 75 TABLET ORAL at 08:37

## 2019-01-01 RX ADMIN — ALBUTEROL SULFATE 2.5 MG: 2.5 SOLUTION RESPIRATORY (INHALATION) at 08:46

## 2019-01-01 RX ADMIN — POTASSIUM CHLORIDE 10 MEQ: 20 TABLET, EXTENDED RELEASE ORAL at 22:22

## 2019-01-01 RX ADMIN — ENOXAPARIN SODIUM 80 MG: 80 INJECTION SUBCUTANEOUS at 16:32

## 2019-01-01 RX ADMIN — Medication 500 MG: at 08:21

## 2019-01-01 RX ADMIN — VITAMIN D, TAB 1000IU (100/BT) 1000 UNITS: 25 TAB at 08:14

## 2019-01-01 RX ADMIN — BENZONATATE 100 MG: 100 CAPSULE ORAL at 20:38

## 2019-01-01 RX ADMIN — LEVOTHYROXINE SODIUM 88 MCG: 88 TABLET ORAL at 05:16

## 2019-01-01 RX ADMIN — Medication 500 MG: at 08:52

## 2019-01-01 RX ADMIN — SUCRALFATE 1 G: 1 TABLET ORAL at 10:45

## 2019-01-01 RX ADMIN — ALBUTEROL SULFATE 2.5 MG: 2.5 SOLUTION RESPIRATORY (INHALATION) at 17:35

## 2019-01-01 RX ADMIN — DOXYCYCLINE HYCLATE 100 MG: 100 TABLET, COATED ORAL at 11:58

## 2019-01-01 RX ADMIN — IPRATROPIUM BROMIDE AND ALBUTEROL SULFATE 1 AMPULE: .5; 3 SOLUTION RESPIRATORY (INHALATION) at 12:54

## 2019-01-01 RX ADMIN — PANTOPRAZOLE SODIUM 40 MG: 40 TABLET, DELAYED RELEASE ORAL at 05:16

## 2019-01-01 RX ADMIN — SODIUM CHLORIDE TAB 1 GM 2 G: 1 TAB at 11:26

## 2019-01-01 RX ADMIN — VITAMIN D, TAB 1000IU (100/BT) 1000 UNITS: 25 TAB at 10:40

## 2019-01-01 RX ADMIN — ISOSORBIDE MONONITRATE 30 MG: 30 TABLET ORAL at 08:07

## 2019-01-01 RX ADMIN — ALPRAZOLAM 0.25 MG: 0.5 TABLET ORAL at 15:04

## 2019-01-01 RX ADMIN — SUCRALFATE 1 G: 1 TABLET ORAL at 17:03

## 2019-01-01 RX ADMIN — SODIUM CHLORIDE 500 ML: 9 INJECTION, SOLUTION INTRAVENOUS at 19:02

## 2019-01-01 RX ADMIN — ISOSORBIDE MONONITRATE 60 MG: 60 TABLET ORAL at 08:37

## 2019-01-01 RX ADMIN — POLYETHYLENE GLYCOL 3350 17 G: 17 POWDER, FOR SOLUTION ORAL at 10:38

## 2019-01-01 RX ADMIN — SUCRALFATE 1 G: 1 TABLET ORAL at 20:38

## 2019-01-01 RX ADMIN — LEVOTHYROXINE SODIUM 88 MCG: 88 TABLET ORAL at 10:59

## 2019-01-01 RX ADMIN — CLOPIDOGREL BISULFATE 75 MG: 75 TABLET ORAL at 11:57

## 2019-01-01 RX ADMIN — VITAMIN D, TAB 1000IU (100/BT) 1000 UNITS: 25 TAB at 08:03

## 2019-01-01 RX ADMIN — POTASSIUM CHLORIDE 10 MEQ: 20 TABLET, EXTENDED RELEASE ORAL at 20:47

## 2019-01-01 RX ADMIN — Medication 500 MG: at 10:39

## 2019-01-01 RX ADMIN — ISOSORBIDE MONONITRATE 30 MG: 30 TABLET ORAL at 20:44

## 2019-01-01 RX ADMIN — DOXYCYCLINE HYCLATE 100 MG: 100 TABLET, COATED ORAL at 15:54

## 2019-01-01 RX ADMIN — GUAIFENESIN AND DEXTROMETHORPHAN 15 ML: 100; 10 SYRUP ORAL at 23:10

## 2019-01-01 RX ADMIN — BUMETANIDE 2 MG: 1 TABLET ORAL at 10:39

## 2019-01-01 RX ADMIN — RANOLAZINE 500 MG: 500 TABLET, FILM COATED, EXTENDED RELEASE ORAL at 22:06

## 2019-01-01 RX ADMIN — VITAMIN D, TAB 1000IU (100/BT) 1000 UNITS: 25 TAB at 07:57

## 2019-01-01 RX ADMIN — ACETAMINOPHEN 650 MG: 325 TABLET ORAL at 13:32

## 2019-01-01 RX ADMIN — ALBUTEROL SULFATE 2.5 MG: 2.5 SOLUTION RESPIRATORY (INHALATION) at 17:11

## 2019-01-01 RX ADMIN — SUCRALFATE 1 G: 1 TABLET ORAL at 16:50

## 2019-01-01 RX ADMIN — ALBUTEROL SULFATE 2.5 MG: 2.5 SOLUTION RESPIRATORY (INHALATION) at 21:02

## 2019-01-01 RX ADMIN — IPRATROPIUM BROMIDE AND ALBUTEROL SULFATE 1 AMPULE: .5; 3 SOLUTION RESPIRATORY (INHALATION) at 09:46

## 2019-01-01 RX ADMIN — DOXYCYCLINE HYCLATE 100 MG: 100 TABLET, COATED ORAL at 21:37

## 2019-01-01 ASSESSMENT — PAIN SCALES - GENERAL
PAINLEVEL_OUTOF10: 0
PAINLEVEL_OUTOF10: 5
PAINLEVEL_OUTOF10: 0
PAINLEVEL_OUTOF10: 4
PAINLEVEL_OUTOF10: 0
PAINLEVEL_OUTOF10: 3
PAINLEVEL_OUTOF10: 0
PAINLEVEL_OUTOF10: 7
PAINLEVEL_OUTOF10: 0
PAINLEVEL_OUTOF10: 5
PAINLEVEL_OUTOF10: 0
PAINLEVEL_OUTOF10: 4
PAINLEVEL_OUTOF10: 0
PAINLEVEL_OUTOF10: 2
PAINLEVEL_OUTOF10: 0
PAINLEVEL_OUTOF10: 5
PAINLEVEL_OUTOF10: 4
PAINLEVEL_OUTOF10: 4
PAINLEVEL_OUTOF10: 5
PAINLEVEL_OUTOF10: 0
PAINLEVEL_OUTOF10: 3
PAINLEVEL_OUTOF10: 0
PAINLEVEL_OUTOF10: 2
PAINLEVEL_OUTOF10: 0
PAINLEVEL_OUTOF10: 3
PAINLEVEL_OUTOF10: 3
PAINLEVEL_OUTOF10: 2
PAINLEVEL_OUTOF10: 0
PAINLEVEL_OUTOF10: 7
PAINLEVEL_OUTOF10: 5
PAINLEVEL_OUTOF10: 9
PAINLEVEL_OUTOF10: 0
PAINLEVEL_OUTOF10: 3
PAINLEVEL_OUTOF10: 0
PAINLEVEL_OUTOF10: 4
PAINLEVEL_OUTOF10: 5
PAINLEVEL_OUTOF10: 0
PAINLEVEL_OUTOF10: 8
PAINLEVEL_OUTOF10: 0
PAINLEVEL_OUTOF10: 5
PAINLEVEL_OUTOF10: 1
PAINLEVEL_OUTOF10: 2
PAINLEVEL_OUTOF10: 0
PAINLEVEL_OUTOF10: 5
PAINLEVEL_OUTOF10: 0
PAINLEVEL_OUTOF10: 1
PAINLEVEL_OUTOF10: 1
PAINLEVEL_OUTOF10: 5

## 2019-01-01 ASSESSMENT — PAIN DESCRIPTION - PROGRESSION
CLINICAL_PROGRESSION: NOT CHANGED
CLINICAL_PROGRESSION: RAPIDLY IMPROVING
CLINICAL_PROGRESSION: NOT CHANGED

## 2019-01-01 ASSESSMENT — PAIN DESCRIPTION - FREQUENCY
FREQUENCY: INTERMITTENT
FREQUENCY: CONTINUOUS
FREQUENCY: INTERMITTENT
FREQUENCY: INTERMITTENT
FREQUENCY: CONTINUOUS
FREQUENCY: INTERMITTENT

## 2019-01-01 ASSESSMENT — PAIN DESCRIPTION - ONSET
ONSET: ON-GOING

## 2019-01-01 ASSESSMENT — PAIN DESCRIPTION - DESCRIPTORS
DESCRIPTORS: ACHING
DESCRIPTORS: ACHING
DESCRIPTORS: HEAVINESS
DESCRIPTORS: HEAVINESS
DESCRIPTORS: JABBING
DESCRIPTORS: HEAVINESS
DESCRIPTORS: DISCOMFORT
DESCRIPTORS: ACHING;SHARP
DESCRIPTORS: HEAVINESS
DESCRIPTORS: HEAVINESS
DESCRIPTORS: ACHING
DESCRIPTORS: DISCOMFORT

## 2019-01-01 ASSESSMENT — PAIN DESCRIPTION - LOCATION
LOCATION: SHOULDER
LOCATION: BACK
LOCATION: CHEST;RIB CAGE
LOCATION: BACK
LOCATION: CHEST
LOCATION: CHEST;ABDOMEN
LOCATION: ABDOMEN
LOCATION: ABDOMEN
LOCATION: CHEST
LOCATION: CHEST
LOCATION: CHEST;SHOULDER
LOCATION: ABDOMEN;CHEST
LOCATION: CHEST

## 2019-01-01 ASSESSMENT — ENCOUNTER SYMPTOMS
EYE DISCHARGE: 0
EYE DISCHARGE: 0
VOMITING: 0
EYE DISCHARGE: 0
BLOOD IN STOOL: 1
RHINORRHEA: 0
EYE ITCHING: 0
WHEEZING: 0
NAUSEA: 0
BACK PAIN: 0
CHEST TIGHTNESS: 0
SHORTNESS OF BREATH: 0
ABDOMINAL PAIN: 0
CONSTIPATION: 0
SINUS PAIN: 0
RHINORRHEA: 0
WHEEZING: 0
WHEEZING: 0
RHINORRHEA: 0
ABDOMINAL PAIN: 1
ANAL BLEEDING: 1
NAUSEA: 0
COUGH: 0
COUGH: 1
VOMITING: 0
ABDOMINAL PAIN: 1
RECTAL PAIN: 0
BLOOD IN STOOL: 0
BACK PAIN: 0
DIARRHEA: 0
SHORTNESS OF BREATH: 1
DIARRHEA: 0
DIARRHEA: 0
SORE THROAT: 0
DIARRHEA: 0
ABDOMINAL PAIN: 0
NAUSEA: 0
EYE PAIN: 0
COUGH: 1
SORE THROAT: 0
STRIDOR: 0
SHORTNESS OF BREATH: 1
CHEST TIGHTNESS: 0
CHEST TIGHTNESS: 0
COUGH: 0
DIARRHEA: 0
VOMITING: 0
EYE PAIN: 0
BACK PAIN: 0
NAUSEA: 0
COUGH: 0
SINUS PRESSURE: 0
SHORTNESS OF BREATH: 0
SORE THROAT: 0
NAUSEA: 1
SHORTNESS OF BREATH: 1
EYE PAIN: 0

## 2019-01-01 ASSESSMENT — PAIN DESCRIPTION - ORIENTATION
ORIENTATION: MID
ORIENTATION: MID
ORIENTATION: LEFT
ORIENTATION: POSTERIOR
ORIENTATION: MID;LOWER;UPPER
ORIENTATION: MID;OUTER
ORIENTATION: MID
ORIENTATION: MID
ORIENTATION: LEFT

## 2019-01-01 ASSESSMENT — PAIN DESCRIPTION - PAIN TYPE
TYPE: ACUTE PAIN
TYPE: CHRONIC PAIN
TYPE: ACUTE PAIN
TYPE: CHRONIC PAIN
TYPE: ACUTE PAIN

## 2019-01-01 ASSESSMENT — PAIN DESCRIPTION - DIRECTION
RADIATING_TOWARDS: MIDDLE CHEST
RADIATING_TOWARDS: LEFT SHOULDER

## 2019-01-01 ASSESSMENT — PAIN - FUNCTIONAL ASSESSMENT
PAIN_FUNCTIONAL_ASSESSMENT: ACTIVITIES ARE NOT PREVENTED

## 2019-01-09 PROBLEM — R07.9 CHEST PAIN: Status: ACTIVE | Noted: 2019-01-01

## 2019-04-15 PROBLEM — I50.9 CHF (CONGESTIVE HEART FAILURE) (HCC): Status: ACTIVE | Noted: 2019-01-01

## 2019-04-15 PROBLEM — J18.9 PNEUMONIA: Status: ACTIVE | Noted: 2019-01-01

## 2019-04-15 PROBLEM — I50.31 ACUTE CONGESTIVE HEART FAILURE WITH LEFT VENTRICULAR DIASTOLIC DYSFUNCTION (HCC): Status: ACTIVE | Noted: 2019-01-01

## 2019-04-15 NOTE — LETTER
Beneficiary Notification Letter     This [place] Provider is Participating in an Innovative Payment and [address] [place] [address] [place] from Medardo Browne:   Jerica is participating in a Medicare initiative called the [facility] for [address] [facility]. You are receiving this letter because your health care provider has identified you as a patient who is receiving care through this initiative. Health care providers participating in the [facility] [address] [facility], including Jerica, will work with Medicare to improve care for patients. Your Medicare rights have not been changed. You still have all the same Medicare rights and protections, including the right to choose which hospital, doctor, or other health care provider you see. However, because Jerica chose to participate in the [address] [place] [address], all Medicare beneficiaries who meet the eligibility criteria of this initiative will receive care under the initiative. If you do not wish to receive care under the Bundled Payments [facility] [address] [facility], you must choose a health care provider that does not participate in this initiative for your care. Regardless of which health care provider you see, Medicare will continue to cover all of your medically necessary services. Bundled Payments for Care Improvement Advanced aims to help improve your care     The Bundled Payments [facility] [address] [facility] is an innovative Medicare initiative that encourages your doctors, hospitals, and other health care providers to work more closely together so you get better care during and following certain hospital stays.  In this initiative, doctors and hospitals may work closely with certain health care providers and suppliers that help patients recover after discharge from the hospital, including skilled nursing facilities, home health agencies, inpatient rehabilitation facilities, and long term care hospitals. [facility] is working closely with the doctors and other health care providers that care for you during and following your hospital stay and for a period of time after you leave the hospital. By working together, the health care providers are trying to more efficiently provide well-managed, high quality, patient-centered care as you undergo treatment. Hospitals, doctors, and other health care providers that care for you following a hospital stay may receive an additional payment for providing better, more coordinated health care. Medicare will monitor your care to make sure you and others get high quality care. Your feedback is important     Medicare may also ask you to answer a survey about the services and care you received from [facility]. The survey will be mailed to you. Your feedback will improve care for all people with Medicare who receive care from [facility]. Completion of this survey is optional.     Get more information     For more information about the Bundled Payments for [address] [place] [address], you can:    · Visit the CMS BPCI Advanced Website at http://poon-flores.net/ initiatives/bpci-advanced   · Call the [facility] BPCI-A team at (774) 790-6328. · Call 1-800-MEDICARE (1-829.573.9974). TTY users can call 6-172.236.5099     If you have concerns or complaints about your care, talk to your health care provider, or contact your Beneficiary and Family Centered Quality Improvement Organization GEORGETTE [facility]ARC[facility]). To get your CC-QIO's phone number, visit Medicare.gov/contacts or call 1-800-MEDICARE. · To find a different hospital, visit www. hospitalcompare.[facility].gov or call 1-800- MEDICARE (1-619.474.3128). TTY users should call 8-221.304.8687. · To find a different doctor, visit Medicare's Physician Compare website, [employer], or call 1-800-MEDICARE (323 6928). TTY users should call 9-445.307.6521. · To find a different skilled nursing facility, visit [employer] website, https://www.[employer]/, or call 1-800-MEDICARE (1- 715.307.3315). TTY users should call 4-704.128.8823. · To find a different long term care hospital, visit [facility] [place] [address] Compare website, [employer], or call 1-800- MEDICARE (387 4663). TTY users should call 1-508.135.1682. · To find a different inpatient rehabilitation facility, visit [facility] [address] Compare website, www.medicare.gov/ inpatientrehabilitation facilitycompare, or call 1-800-MEDICARE (9-859.292.4665). TTY users should call 4- 369.794.1401. · To find a different home health agency, visit [address] website, www.medicare.gov/homehealthcompare, or call 1-800-MEDICARE (1-328- 231-9276). TTY users should call 1-824.686.8038.

## 2019-04-15 NOTE — ED NOTES
Pt given crackers and jello per request. No other concerns or needs voiced at this time.       Madison Luis RN  04/15/19 9196

## 2019-04-15 NOTE — ED PROVIDER NOTES
[facility]  eMERGENCY dEPARTMENT eNCOUnter          CHIEF COMPLAINT       Chief Complaint   Patient presents with    Cough       Nurses Notes reviewed and I agree except as noted in the HPI. HISTORY OF PRESENT ILLNESS    Orlean Carrel is a 80 y.o. female who presents to the Emergency Department for the evaluation of a productive cough that began three weeks ago. The patient states the constant cough came on gradually and is associated with decreased appetite, muscle weakness, and shortness of breath and is constant. Patient chronically experiences leg and ankle swelling due to congestive heart failure. She denies pain, headache, chest pain, and abdominal discomfort. The patient saw her PCP, Dr. Malvin Ng, last Monday and chest X-ray revealed acute heart failure per patient's daughter. The patient was told to double her diuretic at home with no improvement of her symptoms. She has also tried tessalon pearls and albuterol sulfate inhaler with no symptom relief. The patient denies fever, chill, nausea, vomiting, lightheadedness, dizziness, hemoptysis, and sick contacts. Patient has a history of congestive heart failure, hypertension, GERD, coronary artery disease, and chronic kidney disease for which she is being treated. The HPI was provided by the patient. REVIEW OF SYSTEMS     Review of Systems   Constitutional: Positive for appetite change. Negative for chills, diaphoresis and fever. HENT: Negative for postnasal drip, rhinorrhea and sore throat. Eyes: Negative for discharge, itching and visual disturbance. Respiratory: Positive for cough and shortness of breath. Negative for chest tightness. Cardiovascular: Positive for leg swelling. Negative for chest pain and palpitations. Gastrointestinal: Negative for abdominal pain, blood in stool, diarrhea and nausea. Genitourinary: Negative for dysuria, frequency and urgency. Neurological: Positive for weakness.  Negative for dizziness, light-headedness and headaches. PAST MEDICAL HISTORY    has a past medical history of Blood transfusion reaction, CAD (coronary artery disease), CHF (congestive heart failure) ([facility] [address] CKD (chronic kidney disease) stage 3, GFR 30-59 ml/min ([facility]), GERD (gastroesophageal reflux disease), History of bleeding ulcers, Hypertension, Pneumonia, and Thyroid disease. SURGICAL HISTORY      has a past surgical history that includes Cholecystectomy; Appendectomy; Tonsillectomy; Hysterectomy; Breast surgery (Left, 1959); fracture surgery (Right, 1986); Endoscopy, colon, diagnostic; Colonoscopy; and eye surgery (Bilateral, 2006). CURRENT MEDICATIONS       Current Discharge Medication List      CONTINUE these medications which have NOT CHANGED    Details   metoprolol tartrate (LOPRESSOR) 25 MG tablet Take 25 mg by mouth 2 times daily      aspirin 81 MG chewable tablet Take 1 tablet by mouth daily  Qty: 30 tablet, Refills: 3      isosorbide mononitrate (IMDUR) 30 MG extended release tablet Take 1 tablet by mouth daily  Qty: 30 tablet, Refills: 3      pantoprazole (PROTONIX) 40 MG tablet Take 1 tablet by mouth every morning (before breakfast)  Qty: 30 tablet, Refills: 3      Potassium 99 MG TABS Take 1 tablet by mouth daily       Magnesium 500 MG TABS Take 1 tablet by mouth daily       Cholecalciferol (VITAMIN D3) 1000 units CAPS Take 1 tablet by mouth daily       bumetanide (BUMEX) 2 MG tablet Take 1 tablet by mouth daily  Qty: 30 tablet, Refills: 3      acetaminophen 650 MG TABS Take 650 mg by mouth every 4 hours as needed. Qty: 120 tablet, Refills: 3      albuterol (PROAIR HFA) 108 (90 BASE) MCG/ACT inhaler Inhale 2 puffs into the lungs every 6 hours as needed for Wheezing. Qty: 1 Inhaler, Refills: 3      amLODIPine (NORVASC) 10 MG tablet Take 0.5 tablets by mouth nightly. Qty: 30 tablet, Refills: 3      ALPRAZolam (XANAX) 0.25 MG tablet Take 0.25 mg by mouth 3 times daily as needed for Anxiety. nitroGLYCERIN (NITROSTAT) 0.4 MG SL tablet Place 1 tablet under the tongue every 5 minutes as needed for Chest pain. Qty: 25 tablet, Refills: 0      levothyroxine (SYNTHROID) 100 MCG tablet Take 1 tablet by mouth Daily. Qty: 30 tablet, Refills: 0             ALLERGIES     is allergic to avelox [moxifloxacin]; pcn [penicillins]; prednisone; ranexa [ranolazine]; rocephin [ceftriaxone sodium-lidocaine]; sulfur; zithromax [azithromycin]; and codeine. FAMILY HISTORY     indicated that her mother is . She indicated that her father is . She indicated that her sister is . She indicated that both of her brothers are . family history includes Cancer in her sister; Early Death in her mother; Heart Disease in her brother, father, mother, and sister; Substance Abuse in her brother and brother. SOCIAL HISTORY      reports that she has never smoked. She has never used smokeless tobacco. She reports that she does not drink alcohol or use drugs. PHYSICAL EXAM     INITIAL VITALS:  height is 4' 11\" (1.499 m) and weight is 186 lb (84.4 kg). Her oral temperature is 98 °F (36.7 °C). Her blood pressure is 153/67 (abnormal) and her pulse is 70. Her respiration is 20 and oxygen saturation is 96%. Physical Exam   Constitutional: She is oriented to person, place, and time. She appears well-developed and well-nourished. No distress. HENT:   Head: Normocephalic and atraumatic. Cardiovascular: Normal rate, regular rhythm, normal heart sounds and intact distal pulses. Pulmonary/Chest: Effort normal. No accessory muscle usage or stridor. Tachypnea noted. No respiratory distress. She has no decreased breath sounds. She has rhonchi in the right upper field, the right middle field, the right lower field, the left upper field, the left middle field and the left lower field. She exhibits no tenderness. Musculoskeletal:        Right ankle: She exhibits swelling.         Left ankle: She exhibits swelling. Feet:    Neurological: She is alert and oriented to person, place, and time. Skin: Skin is warm and dry. She is not diaphoretic. Psychiatric: She has a normal mood and affect. Her behavior is normal.        DIFFERENTIAL DIAGNOSIS:   CHF, influenza, pneumonia, COPD    DIAGNOSTIC RESULTS     EKG: All EKG's are interpreted by the Emergency Department Physician who either signs or Co-signs this chart in the absence of a cardiologist.    Normal sinus rhythm with first-degree AV block, right bundle branch block, left anterior fascicular block. T-wave inversion in anterior leads    Ventricular rate 67, NH interval 400 ms, QRS duration 132 ms, corrected  ms. RADIOLOGY: non-plainfilm images(s) such as CT, Ultrasound and MRI are read by the radiologist.    XR CHEST PORTABLE   Final Result   1. Moderate cardiomegaly. Moderate-sized hiatus hernia. 2. Mild retrocardiac atelectasis/pneumonia with a small associated effusion. Questionable minimal infiltrate right infrahilar region. No pulmonary vascular congestion seen. **This report has been created using voice recognition software. It may contain minor errors which are inherent in voice recognition technology. **      Final report electronically signed by Dr. Arnaldo Anderson on 4/15/2019 11:18 AM      XR CHEST PORTABLE    (Results Pending)       LABS:     Labs Reviewed   CBC WITH AUTO DIFFERENTIAL - Abnormal; Notable for the following components:       Result Value    Eosinophils # 0.5 (*)     All other components within normal limits   COMPREHENSIVE METABOLIC PANEL - Abnormal; Notable for the following components:    CREATININE 1.3 (*)     BUN 25 (*)     Chloride 96 (*)     Alb 3.2 (*)     ALT 8 (*)     All other components within normal limits   TSH WITHOUT REFLEX - Abnormal; Notable for the following components:    TSH 0.296 (*)     All other components within normal limits   GLOMERULAR FILTRATION RATE, ESTIMATED - Abnormal; Notable for the following components:    Est, Glom Filt Rate 38 (*)     All other components within normal limits   TSH WITHOUT REFLEX - Abnormal; Notable for the following components:    TSH 0.240 (*)     All other components within normal limits   CULTURE BLOOD #1   CULTURE BLOOD #2   GRAM STAIN   RESPIRATORY CULTURE   RAPID INFLUENZA A/B ANTIGENS   LEGIONELLA ANTIGEN, URINE   STREP PNEUMONIAE ANTIGEN   BRAIN NATRIURETIC PEPTIDE   TROPONIN   PROCALCITONIN   ANION GAP   OSMOLALITY   LACTIC ACID, PLASMA   T4, FREE   TROPONIN       EMERGENCY DEPARTMENT COURSE:   Vitals:    Vitals:    04/15/19 1124 04/15/19 1222 04/15/19 1310 04/15/19 1533   BP: (!) 155/61 (!) 161/66 126/86 (!) 153/67   Pulse: 58 60 64 70   Resp: 21 17 18 20   Temp:    98 °F (36.7 °C)   TempSrc:    Oral   SpO2: 96% 97% 97% 96%   Weight:       Height:    4' 11\" (1.499 m)       11:04 AM: The patient was seen and evaluated. Patient has mild respiratory distress. She is doing well on supplemental oxygen. Appropriate labs and x-ray are ordered. Patient's labs are reassuring. Her white blood cell count of 7.7, H&H are stable. Patient has no elevation in her lactic acid or pro-calcitonin. The chest x-ray read by the radiologist is concerning for mild retrocardiac atelectasis versus pneumonia with a small associated effusion. There is also questionable infiltrate in the right infrahilar region. Dr. Candace Rasheed reports there is no pulmonary vascular congestion seen. There is moderate cardiomegaly. The patient has been treated by her primary care provider by doubling up on her Bumex. The patient has had no improvement. There is no evidence of CHF on chest x-ray. Her BNP is slightly elevated at 1402. Patient has no acute EKG changes and her troponin is less than 0.01. I contacted Dr. Jer Steinberg, the patient's cardiologist.  Jd Curl him of the findings. He asked that the patient be admitted to internal medicine.   He requested Dr. Shantelle Medina internal medicine group to do the admission to the hospital.  I advised the patient of the need for admission and she states that she has no affiliation with other internal medicine doctors. I contacted Lexie Matthew who graciously agreed to admit the patient to the hospital.  I advised him that the patient has an increase in fatigue, weakness, and is requiring oxygen during the day where she previously required at night. It is unclear at this point if this is exacerbation of CHF, pneumonia, or other pathology. The patient has multiple allergies to antibiotics. I discussed this with . He advised that he will evaluate the patient and treat accordingly. No diuretics or antibiotics were given at this point. MDM:  The patient will be admitted for further evaluation and treatment of her dyspnea. She is admitted to Dr Makayla Ledbetter. CRITICAL CARE:   None    CONSULTS:  Dr Get Henning, cardiology  Dr Makayla Ledbetter, Internal Medicine    PROCEDURES:  None    FINAL IMPRESSION      1. Dyspnea and respiratory abnormalities          DISPOSITION/PLAN   Admit    PATIENT REFERRED TO:  Maria Isabel Polk MD  [address] [facility]  [address] [facility]  [phone]            DISCHARGE MEDICATIONS:  Current Discharge Medication List          (Please note that portions of this note were completed with a voice recognition program.  Efforts were made to edit the dictations but occasionally words are mis-transcribed.)    The patient was given an opportunity to see the Emergency Attending. The patient voiced understanding that I was a Mid-LevelProvider and was in agreement with being seen independently by myself.           Aparna Irvin, LACY - CNP  04/15/19 5280

## 2019-04-15 NOTE — H&P
mouth daily 30 tablet 3    pantoprazole (PROTONIX) 40 MG tablet Take 1 tablet by mouth every morning (before breakfast) 30 tablet 3    Potassium 99 MG TABS Take 1 tablet by mouth daily       Magnesium 500 MG TABS Take 1 tablet by mouth daily       Cholecalciferol (VITAMIN D3) 1000 units CAPS Take 1 tablet by mouth daily       bumetanide (BUMEX) 2 MG tablet Take 1 tablet by mouth daily 30 tablet 3    acetaminophen 650 MG TABS Take 650 mg by mouth every 4 hours as needed. 120 tablet 3    albuterol (PROAIR HFA) 108 (90 BASE) MCG/ACT inhaler Inhale 2 puffs into the lungs every 6 hours as needed for Wheezing. 1 Inhaler 3    amLODIPine (NORVASC) 10 MG tablet Take 0.5 tablets by mouth nightly. (Patient taking differently: Take 5 mg by mouth nightly Pt takes in the morning.) 30 tablet 3    ALPRAZolam (XANAX) 0.25 MG tablet Take 0.25 mg by mouth 3 times daily as needed for Anxiety.  nitroGLYCERIN (NITROSTAT) 0.4 MG SL tablet Place 1 tablet under the tongue every 5 minutes as needed for Chest pain. 25 tablet 0    levothyroxine (SYNTHROID) 100 MCG tablet Take 1 tablet by mouth Daily. 30 tablet 0       Allergies: Avelox [moxifloxacin]; Pcn [penicillins]; Prednisone; Ranexa [ranolazine]; Rocephin [ceftriaxone sodium-lidocaine]; Sulfur; Zithromax [azithromycin]; and Codeine    Social History:    reports that she has never smoked. She has never used smokeless tobacco. She reports that she does not drink alcohol or use drugs.     Family History:       Problem Relation Age of Onset    Heart Disease Mother     Early Death Mother     Heart Disease Father     Cancer Sister     Heart Disease Sister     Heart Disease Brother     Substance Abuse Brother     Substance Abuse Brother        Review of systems:    CNS: No seizures, no dizziness, no facial droop,  no paresthesia, numbness or muscle  Weakness, no dysphasia or  Dysphagia,  CARDIOVASCULAR: As per HPI or otherwise negative  RESPIRATORY: No cough wheezing , rhinorrhea, nasal congestion or sore throat  GASTROINTESTINAL SYSTEM: No nausea,  vomiting , diarrhea , constipation, abdominal pain, abdominal swelling , hematochezia or melanotic stools, no weight loss, no heartburn, no dysphagia or odynophagia  GENITOURINARY SYSTEM: No dysuria , hematuria, urinary frequency , incontinence,  flank pains , urgency , genital discharge  SKIN / Brynda Minaya:  No rashes, petechiae, , no open wounds , no soft tissue swelling   MUSCULOSKELETAL: No bone pains, no arthralgia, no joint swelling, no myalgia  HEMATOLOGIC: No easy bruising, no bleeding  OPHTHALMOLOGY: No conjuctival injection, no discharge, no pain, no blurring of    vision, no loss of vision, no diplopia  EAR: No loss of hearing , tinnitus, ear discharge , no ear pain          Vitals:   Vitals:    04/15/19 1222   BP: (!) 161/66   Pulse: 60   Resp: 17   Temp:    SpO2: 97%      BMI: Body mass index is 37.57 kg/m². PHYSICAL EXAMINATION:            General appearance:  Has mild distress, appears stated age and cooperative. HEENT:  Normal cephalic, atraumatic without obvious deformity. Pupils equal, round, and reactive to light. Neck: Supple   Respiratory: diffuse bilateral Wheezes/Rhonchi. Cardiovascular:  Regular rhythm with normal S1/S2 without rubs or gallops. Abdomen: Soft, non-tender, non-distended with normal bowel sounds. Musculoskeletal:  No clubbing, cyanosis  trace edema bilaterally. Skin: Skin color, texture, turgor normal.  No rashes or lesions. Neurologic: Alert and oriented, Neurovascularly intact without any focal motor deficits.         Review of Labs and Diagnostic Testing:    Recent Results (from the past 24 hour(s))   CBC auto differential    Collection Time: 04/15/19 11:08 AM   Result Value Ref Range    WBC 7.7 4.8 - 10.8 thou/mm3    RBC 4.48 4.20 - 5.40 mill/mm3    Hemoglobin 13.3 12.0 - 16.0 gm/dl    Hematocrit 39.9 37.0 - 47.0 %    MCV 89.1 81.0 - 99.0 fL    MCH 29.7 26.0 - 33.0 pg    MCHC 33.3 32.2 - 35.5 gm/dl    RDW-CV 13.0 11.5 - 14.5 %    RDW-SD 42.1 35.0 - 45.0 fL    Platelets 514 730 - 095 thou/mm3    MPV 9.9 9.4 - 12.4 fL    Seg Neutrophils 65.1 %    Lymphocytes 20.5 %    Monocytes 7.8 %    Eosinophils 5.9 %    Basophils 0.4 %    Immature Granulocytes 0.3 %    Segs Absolute 5.0 1.8 - 7.7 thou/mm3    Lymphocytes # 1.6 1.0 - 4.8 thou/mm3    Monocytes # 0.6 0.4 - 1.3 thou/mm3    Eosinophils # 0.5 (H) 0.0 - 0.4 thou/mm3    Basophils # 0.0 0.0 - 0.1 thou/mm3    Immature Grans (Abs) 0.02 0.00 - 0.07 thou/mm3    nRBC 0 /100 wbc   Comprehensive Metabolic Panel    Collection Time: 04/15/19 11:08 AM   Result Value Ref Range    Glucose 104 70 - 108 mg/dL    CREATININE 1.3 (H) 0.4 - 1.2 mg/dL    BUN 25 (H) 7 - 22 mg/dL    Sodium 138 135 - 145 meq/L    Potassium 4.2 3.5 - 5.2 meq/L    Chloride 96 (L) 98 - 111 meq/L    CO2 32 23 - 33 meq/L    Calcium 9.4 8.5 - 10.5 mg/dL    AST 18 5 - 40 U/L    Alkaline Phosphatase 96 38 - 126 U/L    Total Protein 7.4 6.1 - 8.0 g/dL    Alb 3.2 (L) 3.5 - 5.1 g/dL    Total Bilirubin 0.8 0.3 - 1.2 mg/dL    ALT 8 (L) 11 - 66 U/L   Brain Natriuretic Peptide    Collection Time: 04/15/19 11:08 AM   Result Value Ref Range    Pro-BNP 1402.0 0.0 - 1800.0 pg/mL   TSH without Reflex    Collection Time: 04/15/19 11:08 AM   Result Value Ref Range    TSH 0.296 (L) 0.400 - 4.20 uIU/mL   Troponin    Collection Time: 04/15/19 11:08 AM   Result Value Ref Range    Troponin T < 0.010 ng/ml   Procalcitonin    Collection Time: 04/15/19 11:08 AM   Result Value Ref Range    Procalcitonin 0.08 0.01 - 0.09 ng/mL   Anion Gap    Collection Time: 04/15/19 11:08 AM   Result Value Ref Range    Anion Gap 10.0 8.0 - 16.0 meq/L   Glomerular Filtration Rate, Estimated    Collection Time: 04/15/19 11:08 AM   Result Value Ref Range    Est, Glom Filt Rate 38 (A) ml/min/1.73m2   Osmolality    Collection Time: 04/15/19 11:08 AM   Result Value Ref Range    Osmolality Calc 280.4 275.0 - 300 mOsmol/kg   Lactic acid, blood cultures, sputum gram stain c/s, Incentive spirometry , acapella  Short course of steroids,         DVT prophylaxis: [] Lovenox                                 [] SCDs                                 [] SQ Heparin                                 [] Encourage ambulation, low risk for DVT, no chemical or mechanical prophylaxis necessary              [] Already on Anticoagulation                Anticipated Disposition upon discharge: [] Home                                                                         [] Home with Home Health                                                                         [] Vijay Chowdhury                                                                         [] [address] [place] [address],[address]          Electronically signed by Marlin Escalante MD on 4/15/2019 at 1:10 PM

## 2019-04-15 NOTE — ED TRIAGE NOTES
Pt presents to ED c/c cough x 3 weeks with hx of CHF. Pt states she has gone to her pcp and was originally dx with cough d/t virus. She states she went last week and they sent results to Dr. Patsy De La Torre but that he did not call her back. Pt wears home O2 at night of 2L/NC. Pt A&O x 4 and lives at home. Pt SpO2 during triage 89% on room air. Pt placed on 2L/NC.  SpO2 currently 94%

## 2019-04-16 NOTE — PROGRESS NOTES
Dr Angel Jacob paged at [phone]. Messaged left with office staff.  office states that they will page him

## 2019-04-16 NOTE — PROGRESS NOTES
Nutrition Assessment    Type and Reason for Visit: Initial, Consult(Heart failure diet guidelines; diet education)    Nutrition Recommendations: Will send Ensure Enlive once/day as substitute for Boost once/day pta. Nutrition Assessment:   Pt. nutritionally compromised AEB report of poor appetite past 3 weeks. At risk for further nutrition compromise r/t underlying medical condition (CHF, CKD); advanced age. Will send Ensure Enlive once/day as substitute for Boost (drinks once/day pta). Reviewed basics of low sodium diet. Family did state \"she's 80. We are inclined to let her eat what she wants\". Encouraged intake as tolerated at best efforts. Did discuss high sodium foods worsening edema, SOB. Malnutrition Assessment:  · Malnutrition Status: At risk for malnutrition    Nutrition Risk Level:  Moderate    Nutrient Needs:  · Estimated Daily Total Kcal: 3328-8520 kcals (15-18 kcals/kgm wt of 85 kgm)  · Estimated Daily Protein (g): 31-35 gm (0.7-0.8 gm/kgm IBW of 44 kgm)     Nutrition Diagnosis:   · Problem: Inadequate oral intake  · Etiology: related to Insufficient energy/nutrient consumption     Signs and symptoms:  as evidenced by Diet history of poor intake    Objective Information:  · Nutrition-Focused Physical Findings: denies trouble with chewing/swallowing; states typically has edema in legs; 4/16: BUN 23, Cr 1.4  · Wound Type: None(noted)  · Current Nutrition Therapies:  · Oral Diet Orders: Cardiac   · Oral Diet intake: 51-75%  · Oral Nutrition Supplement (ONS) Orders: Standard High Calorie Oral Supplement(once/day)  · ONS intake: (initiated)  · Anthropometric Measures:  · Ht: 4' 11\" (149.9 cm)   · Current Body Wt: 187 lb 11.2 oz (85.1 kg)(4/16, +1 edema)  · Admission Body Wt: 187 lb 11.2 oz (85.1 kg)(4/16, +1 edema)  · Usual Body Wt: (per pt 194-196# with fluid; per EMR: 1/9/19: 186# 8 oz, 1/2015: 194# 3.2 oz)  · % Weight Change:  varies with fluid  · Ideal Body Wt: 98 lb (44.5 kg), · BMI Classification: BMI 35.0 - 39.9 Obese Class II    Nutrition Interventions:   Continue current diet, Start ONS  Continued Inpatient Monitoring, Education Completed, Coordination of Care(4/16 Reviewed basics of low sodium diet to prevent edema, SOB, and encouraged comfort. Will send Ensure Enlive as substitute for Boost that pt drinks pta. Written information, RD name and number given.)    Nutrition Evaluation:   · Evaluation: Goals set   · Goals: Pt. will consume 75% or more of meals during LOS.     · Monitoring: Meal Intake, Supplement Intake, Diet Tolerance, Skin Integrity, Weight, Pertinent Labs, Patient/Family Education, Monitor Bowel Function      Electronically signed by Yaya Rowell RD, LD on 4/16/19 at 11:36 AM    Contact Number: [phone]

## 2019-04-16 NOTE — PROGRESS NOTES
tablet Oral Daily    isosorbide mononitrate  30 mg Oral Daily    Magnesium Oxide  500 mg Oral Daily    metoprolol tartrate  25 mg Oral BID    pantoprazole  40 mg Oral QAM AC    sodium chloride flush  10 mL Intravenous 2 times per day    furosemide  20 mg Intravenous Q8H    heparin (porcine)  5,000 Units Subcutaneous 3 times per day    albuterol  2.5 mg Nebulization Q4H WA    potassium chloride  10 mEq Oral Q8H    meropenem  1 g Intravenous Q8H    doxycycline hyclate  100 mg Oral 2 times per day    levothyroxine  88 mcg Oral Daily    methylPREDNISolone  40 mg Intravenous Q8H     Continuous Infusions:  PRN Meds:acetaminophen, ALPRAZolam, nitroGLYCERIN, sodium chloride flush, magnesium hydroxide, ondansetron        Allergies: Avelox [moxifloxacin]; Pcn [penicillins]; Prednisone; Ranexa [ranolazine]; Rocephin [ceftriaxone sodium-lidocaine]; Sulfur; Zithromax [azithromycin]; and Codeine    OBJECTIVE:    Vitals:   Vitals:    04/16/19 1115   BP: (!) 123/58   Pulse: 83   Resp: 16   Temp: 97.8 °F (36.6 °C)   SpO2: 91%      BMI: Body mass index is 37.91 kg/m². PHYSICAL EXAMINATION:            General appearance:  Has mild distress, appears stated age and cooperative. HEENT:  Normal cephalic, atraumatic without obvious deformity. Pupils equal, round, and reactive to light. Neck: Supple   Respiratory: diffuse bilateral Wheezes/Rhonchi. Cardiovascular:  Regular rhythm with normal S1/S2 without rubs or gallops. Abdomen: Soft, non-tender, non-distended with normal bowel sounds. Musculoskeletal:  No clubbing, cyanosis  trace edema bilaterally. Skin: Skin color, texture, turgor normal.  No rashes or lesions.   Neurologic: Alert and oriented, Neurovascularly intact without any focal motor deficits.           Review of Labs and Diagnostic Testing:    Recent Results (from the past 24 hour(s))   TSH    Collection Time: 04/15/19  3:57 PM   Result Value Ref Range    TSH 0.240 (L) 0.400 - 4.20 uIU/mL   T4, free Collection Time: 04/15/19  3:57 PM   Result Value Ref Range    T4 Free 1.53 0.93 - 1.76 ng/dL   Troponin    Collection Time: 04/15/19  3:57 PM   Result Value Ref Range    Troponin T < 0.010 ng/ml   POCT Glucose    Collection Time: 04/15/19  8:03 PM   Result Value Ref Range    POC Glucose 106 70 - 108 mg/dl   Respiratory Culture    Collection Time: 04/15/19  8:55 PM   Result Value Ref Range    Respiratory Culture Normal kika- preliminary     Gram Stain Result       Quality of sputum specimen: Specimen acceptable. Few segmented neutrophils observed. Rare epithelial cells observed. Many gram positive cocci occurring singly and in pairs. Many gram positive bacilli. Few gram negative bacilli.      Rapid influenza A/B antigens    Collection Time: 04/15/19  9:45 PM   Result Value Ref Range    Flu A Antigen NEGATIVE NEGATIVE    Flu B Antigen NEGATIVE NEGATIVE   Basic Metabolic Panel    Collection Time: 04/16/19  6:33 AM   Result Value Ref Range    Sodium 141 135 - 145 meq/L    Potassium 4.0 3.5 - 5.2 meq/L    Chloride 98 98 - 111 meq/L    CO2 30 23 - 33 meq/L    Glucose 178 (H) 70 - 108 mg/dL    BUN 23 (H) 7 - 22 mg/dL    CREATININE 1.4 (H) 0.4 - 1.2 mg/dL    Calcium 9.6 8.5 - 10.5 mg/dL   Magnesium    Collection Time: 04/16/19  6:33 AM   Result Value Ref Range    Magnesium 2.2 1.6 - 2.4 mg/dL   Lipid panel - fasting    Collection Time: 04/16/19  6:33 AM   Result Value Ref Range    Cholesterol, Total 181 100 - 199 mg/dL    Triglycerides 93 0 - 199 mg/dL    HDL 51 mg/dL    LDL Calculated 111 mg/dL   CBC auto differential    Collection Time: 04/16/19  6:33 AM   Result Value Ref Range    WBC 5.3 4.8 - 10.8 thou/mm3    RBC 4.73 4.20 - 5.40 mill/mm3    Hemoglobin 13.8 12.0 - 16.0 gm/dl    Hematocrit 41.7 37.0 - 47.0 %    MCV 88.2 81.0 - 99.0 fL    MCH 29.2 26.0 - 33.0 pg    MCHC 33.1 32.2 - 35.5 gm/dl    RDW-CV 12.7 11.5 - 14.5 %    RDW-SD 41.1 35.0 - 45.0 fL    Platelets 202 361 - 845 thou/mm3    MPV 9.9 9.4 - 12.4 fL Seg Neutrophils 91.8 %    Lymphocytes 7.0 %    Monocytes 0.4 %    Eosinophils 0.2 %    Basophils 0.2 %    Immature Granulocytes 0.4 %    Segs Absolute 4.9 1.8 - 7.7 thou/mm3    Lymphocytes # 0.4 (L) 1.0 - 4.8 thou/mm3    Monocytes # 0.0 (L) 0.4 - 1.3 thou/mm3    Eosinophils # 0.0 0.0 - 0.4 thou/mm3    Basophils # 0.0 0.0 - 0.1 thou/mm3    Immature Grans (Abs) 0.02 0.00 - 0.07 thou/mm3    nRBC 0 /100 wbc   Anion Gap    Collection Time: 04/16/19  6:33 AM   Result Value Ref Range    Anion Gap 13.0 8.0 - 16.0 meq/L   Glomerular Filtration Rate, Estimated    Collection Time: 04/16/19  6:33 AM   Result Value Ref Range    Est, Glom Filt Rate 35 (A) ml/min/1.73m2       Radiology:     Xr Chest Portable    Result Date: 4/16/2019  PROCEDURE: XR CHEST PORTABLE CLINICAL INFORMATION: f/u CHF. COMPARISON: Chest x-ray dated 4/15/2019 TECHNIQUE: AP Portable upright chest xray FINDINGS: Lungs/pleura: There is mildly worsened left lower lobe atelectasis or pneumonia. Right lung is clear. No pleural effusion. No pneumothorax. Heart: There is stable mild cardiomegaly. Mediastinum/irma: No change in the large hiatal hernia. The irma are unremarkable. Skeleton: No acute bone or joint abnormality. Bones are osteopenic. Lines/tubes/devices: none     Mildly worsened left lower lobe pneumonia versus atelectasis. **This report has been created using voice recognition software. It may contain minor errors which are inherent in voice recognition technology. ** Final report electronically signed by Dr. Uma Goznales on 4/16/2019 5:19 AM    Xr Chest Portable    Result Date: 4/15/2019  PROCEDURE: XR CHEST PORTABLE CLINICAL INFORMATION: cough, short of breath, hx CHF COMPARISON: 11/17/2017 TECHNIQUE: A single mobile view of the chest was obtained. 1. Moderate cardiomegaly. Moderate-sized hiatus hernia. 2. Mild retrocardiac atelectasis/pneumonia with a small associated effusion. Questionable minimal infiltrate right infrahilar region.  No pulmonary vascular congestion seen. **This report has been created using voice recognition software. It may contain minor errors which are inherent in voice recognition technology. ** Final report electronically signed by Dr. Hortencia Zamora on 4/15/2019 11:18 AM    ECHO: 1/10/19  Summary   Normal left ventricle size and systolic function. Ejection fraction was   estimated at -50   55-%. There were no regional left ventricular wall motion abnormalities   and wall thickness was within normal limits.   The aortic valve was trileaflet with increase thickness and cuspal   separation. DOPPLER: Transaortic velocity was within the normal range with   no evidence of aortic stenosis. There was mild aortic regurgitation.   The mitral valve structure was calcified with normal leaflet separation.   DOPPLER: The transmitral velocity was within the normal range with no   evidence for mitral stenosis. There was mild mitral regurgitation.              ASSESMENT:      Active Problems:    CHF (congestive heart failure) (HCC)    Pneumonia    Acute congestive heart failure with left ventricular diastolic dysfunction (HCC)  Resolved Problems:    * No resolved hospital problems. *      PLAN:  Cautious diuresis, reduce lasix to q12h  hrs last ECHO from 3 months ago as noted above,  trend troponin,   monitor weight, fluid restrict, O2 per protocol ; Serial CXR,   await Cardiology consult. continue on bronchodilators, O2, broad spectrum antibiotics to cover community acquired organisms,   Follow-up legionella and strep pneumonia urinary antigens, blood cultures, sputum gram stain c/s, Incentive spirometry , acapella  Short course of steroids, guiafenesin, ? Radiologic lag - pulmonary consult.    rapid influenza screen was negative.     Code status discussed -  LIMITED    Discussed with patient , son and daughter    DVT prophylaxis: [] Lovenox                                 [] SCDs                                 [x] SQ Heparin [] Encourage ambulation, low risk for DVT, no chemical or mechanical prophylaxis necessary              [] Already on Anticoagulation                Anticipated Disposition upon discharge: [x] Home                                                                         [] Home with Home Health                                                                         [] [facility]                                                                         [] [address] [place] [address],[address]          Electronically signed by Beka Rincon MD on 4/16/2019 at 12:27 PM

## 2019-04-16 NOTE — PLAN OF CARE
Problem: Falls - Risk of:  Goal: Will remain free from falls  Description  Will remain free from falls  Outcome: Ongoing  Note:   Patient free from falls. Bed in low locked position, side rails up x2. Call light and personal belongings within reach. Bed alarm on. Problem: Falls - Risk of:  Goal: Absence of physical injury  Description  Absence of physical injury  Outcome: Ongoing  Note:   Patient free from accidental injury. Bed in low locked position, side rails up x2. Call light and personal belongings within reach. Bed alarm on. Problem: HEMODYNAMIC STATUS  Goal: Patient has stable vital signs and fluid balance  Outcome: Ongoing  Note:   Patient will remain hemodynamically stable. Blood pressure stable and within normal range. Problem: FLUID AND ELECTROLYTE IMBALANCE  Goal: Fluid and electrolyte balance are achieved/maintained  Outcome: Ongoing  Note:   Patient's I&O's being monitored, MM intact, +1 edema in lower legs, lasix administered. INT        Problem: ACTIVITY INTOLERANCE/IMPAIRED MOBILITY  Goal: Mobility/activity is maintained at optimum level for patient  Outcome: Ongoing  Note:   Patient on bedrest with bathroom privileges. Front wheel walker used when ambulating. Care plan reviewed with patient. No questions or concerns at this time.

## 2019-04-16 NOTE — CONSULTS
[facility]    Patient - Raiza Jackson   MRN -  [id number]   [facility]   - 1926      Date of Admission -  4/15/2019 10:21 AM  Date of evaluation -  2019  Room - --SHERRIE Friend MD Primary Care Physician - Sanna Chiang MD   Chief Complaint/Reason for Consult   Frequent cough, ? Worsening Pneumonia and CHF  Active Hospital Problem List      Active Hospital Problems    Diagnosis Date Noted    CHF (congestive heart failure) ([facility] [I50.9] 04/15/2019    Pneumonia [J18.9] 04/15/2019    Acute congestive heart failure with left ventricular diastolic dysfunction ([facility] [I50.31] 04/15/2019     HPI   Raiza Jackson is a 80 y.o. female admitted for CHF exacerbation/PNA. The patient is a 80 y.o. female who presented with worsening of shortness of breath for the last 3weeks with associated cough. Her current illness started as upper respiratory tract infection with cough, cold and sputum  production which is white in color . Her symptoms are associated with  fatigue, decreased appetite. Patient mentions chronic LE edema that comes and goes but is not new. Patient has required the use of her 2L NC O2 at rest over the past 3 weeks. PMHX of CHF, HTN, GERD, CAD, CKD    Patient denies any problems living at home on her own. Denies any dysphagia, reflux, regurgitation, heartburn. Denies fever, chills, headache, chest pain, abd pain, nausea, vomiting,  problems, dizziness, lightheadedness, hemoptysis, sick contacts. The patient saw her PCP, Dr. Natalie Mcdowell, last Monday and chest X-ray revealed acute heart failure per patient's daughter. The patient was told to double her diuretic at home with no improvement of her symptoms. She has also tried tessalon pearls and symbicort inhaler with no symptom relief. Patient denies smoking.   States she has a significant second hand smoking (gastroesophageal reflux disease)     History of bleeding ulcers     Hypertension     Pneumonia     Thyroid disease       Past Surgical History           Procedure Laterality Date    APPENDECTOMY      BREAST SURGERY Left 1959    cyst removal     CHOLECYSTECTOMY      COLONOSCOPY      ENDOSCOPY, COLON, DIAGNOSTIC      EYE SURGERY Bilateral 2006    cataract removal    FRACTURE SURGERY Right 1986    R arm fx    HYSTERECTOMY      TONSILLECTOMY       Diet    DIET CARDIAC; Dietary Nutrition Supplements: Standard High Calorie Oral Supplement  Allergies    Avelox [moxifloxacin]; Pcn [penicillins]; Prednisone; Ranexa [ranolazine]; Rocephin [ceftriaxone sodium-lidocaine]; Sulfur; Zithromax [azithromycin]; and Codeine  Social History     Social History     Socioeconomic History    Marital status:       Spouse name: Not on file    Number of children: Not on file    Years of education: Not on file    Highest education level: Not on file   Occupational History    Not on file   Social Needs    Financial resource strain: Not on file    Food insecurity:     Worry: Not on file     Inability: Not on file    Transportation needs:     Medical: Not on file     Non-medical: Not on file   Tobacco Use    Smoking status: Never Smoker    Smokeless tobacco: Never Used   Substance and Sexual Activity    Alcohol use: No    Drug use: No    Sexual activity: Never   Lifestyle    Physical activity:     Days per week: Not on file     Minutes per session: Not on file    Stress: Not on file   Relationships    Social connections:     Talks on phone: Not on file     Gets together: Not on file     Attends Mandaen service: Not on file     Active member of club or organization: Not on file     Attends meetings of clubs or organizations: Not on file     Relationship status: Not on file    Intimate partner violence:     Fear of current or ex partner: Not on file     Emotionally abused: Not on file     Physically abused: Not on file     Forced sexual activity: Not on file   Other Topics Concern    Not on file   Social History Narrative    Not on file     Family History          Problem Relation Age of Onset    Heart Disease Mother     Early Death Mother     Heart Disease Father     Cancer Sister     Heart Disease Sister     Heart Disease Brother     Substance Abuse Brother     Substance Abuse Brother      Sleep History    n/a,  ROS    General/Constitutional: No recent loss of weight. Decreased appetite. No fever or chills. +Fatigue  +muscle aches  HENT: Negative. Eyes: Negative. Upper respiratory tract: No nasal stuffiness or post nasal drip. Lower respiratory tract/ lungs: + cough + white/clear sputum production. No hemoptysis. Cardiovascular: No palpitations, chest pain. LE edema  Gastrointestinal: No nausea or vomiting. Neurological: No focal neurological weakness. Extremities: No tenderness. Musculoskeletal: no complaints  Genitourinary: No complaints. Hematological: Negative. Denies easy buising  Skin: No itching.   Meds    Current Medications    furosemide  20 mg Intravenous BID    potassium chloride  10 mEq Oral BID    amLODIPine  5 mg Oral Daily    aspirin  81 mg Oral Daily    vitamin D  1 tablet Oral Daily    isosorbide mononitrate  30 mg Oral Daily    Magnesium Oxide  500 mg Oral Daily    metoprolol tartrate  25 mg Oral BID    pantoprazole  40 mg Oral QAM AC    sodium chloride flush  10 mL Intravenous 2 times per day    heparin (porcine)  5,000 Units Subcutaneous 3 times per day    albuterol  2.5 mg Nebulization Q4H WA    meropenem  1 g Intravenous Q8H    doxycycline hyclate  100 mg Oral 2 times per day    levothyroxine  88 mcg Oral Daily    methylPREDNISolone  40 mg Intravenous Q8H     guaiFENesin-dextromethorphan, acetaminophen, ALPRAZolam, nitroGLYCERIN, sodium chloride flush, magnesium hydroxide, ondansetron  IV Drips/Infusions    Vitals    Vitals    height is 4' 11\" (1.499 m) and weight is 187 lb 11.2 oz (85.1 kg). Her oral temperature is 97.8 °F (36.6 °C). Her blood pressure is 123/58 (abnormal) and her pulse is 83. Her respiration is 16 and oxygen saturation is 91%. O2 Flow Rate (L/min): 2 L/min  I/O    Intake/Output Summary (Last 24 hours) at 4/16/2019 1441  Last data filed at 4/16/2019 0738  Gross per 24 hour   Intake 170 ml   Output 1075 ml   Net -905 ml     Patient Vitals for the past 96 hrs (Last 3 readings):   Weight   04/16/19 0538 187 lb 11.2 oz (85.1 kg)   04/15/19 1024 186 lb (84.4 kg)     Exam   Constitutional: Patient appears moderately built and moderately nourished. Head: Normocephalic and atraumatic. Mouth/Throat: Oropharynx is clear and moist.  No oral thrush. Eyes: Conjunctivae are normal. Pupils are equal, round, and reactive to light. No scleral icterus. Neck: Neck supple. No JVD or tracheal deviation present. Cardiovascular: Regular rate, regular rhythm, S1 and S2 with no murmur. No peripheral edema  Pulmonary/Chest: Normal effort with clear breath sounds. No stridor. No respiratory distress. No rales bilaterally. Bilateral occasional expiratory wheezing. Abdominal: Soft. Bowel sounds audible. No distension or tenderness to palp  Musculoskeletal: Moves all extremities  Lymphadenopathy:  No cervical adenopathy. Neurological: Patient is alert and oriented to person, place, and time. Skin: Skin is warm and dry.     Labs   ABG  Lab Results   Component Value Date    PH 7.40 11/14/2015    PO2 102 11/14/2015    PCO2 47 11/14/2015    HCO3 29 11/14/2015    O2SAT 98 11/14/2015     No results found for: IFIO2, MODE, SETTIDVOL, SETPEEP  CBC  Recent Labs     04/15/19  1108 04/16/19  0633   WBC 7.7 5.3   RBC 4.48 4.73   HGB 13.3 13.8   HCT 39.9 41.7   MCV 89.1 88.2   MCH 29.7 29.2   MCHC 33.3 33.1    207   MPV 9.9 9.9      BMP  Recent Labs     04/15/19  1108 04/16/19  0633    141   K 4.2 4.0   CL 96* 98   CO2 32 30   BUN 25* 23*   CREATININE 1.3* 1.4*   GLUCOSE 104 178*   MG  --  2.2   CALCIUM 9.4 9.6     LFT  Recent Labs     04/15/19  1108   AST 18   ALT 8*   BILITOT 0.8   ALKPHOS 96     TROP  Lab Results   Component Value Date    TROPONINT < 0.010 04/15/2019    TROPONINT < 0.010 04/15/2019    TROPONINT < 0.010 01/10/2019     BNP  Lab Results   Component Value Date    PROBNP 1402.0 04/15/2019    PROBNP 7128.0 11/17/2017    PROBNP 649.9 11/15/2015     D-Dimer  Lab Results   Component Value Date    DDIMER 1767.82 11/16/2015     Lactic Acid  Recent Labs     04/15/19  1211   LACTA 0.9     INR  No results for input(s): INR, PROTIME in the last 72 hours. PTT  No results for input(s): APTT in the last 72 hours. Glucose  Recent Labs     04/15/19  2003   POCGLU 106     UA No results for input(s): Babara Kevin, COLORU, CLARITYU, MUCUS, PROTEINU, BLOODU, RBCUA, WBCUA, BACTERIA, NITRU, GLUCOSEU, BILIRUBINUR, UROBILINOGEN, KETUA, LABCAST, LABCASTTY, AMORPHOS in the last 72 hours. Invalid input(s): CRYSTALS. PFTs     Echo    Transthoracic Echocardiography Report (TTE)  Radha Escalante MD 1/10/2019     Summary   Normal left ventricle size and systolic function. Ejection fraction was   estimated at -50   55-%. There were no regional left ventricular wall motion abnormalities   and wall thickness was within normal limits.   The aortic valve was trileaflet with increase thickness and cuspal   separation. DOPPLER: Transaortic velocity was within the normal range with   no evidence of aortic stenosis. There was mild aortic regurgitation.   The mitral valve structure was calcified with normal leaflet separation.   DOPPLER: The transmitral velocity was within the normal range with no   evidence for mitral stenosis. There was mild mitral regurgitation.   Cultures    Procalcitonin  Lab Results   Component Value Date    PROCAL 0.08 04/15/2019    PROCAL < 0.05 11/15/2015       Respiratory Culture:   Respiratory Culture [[id number]] Collected: 04/15/19 2055   Order Status: evaluation on DC    Diuresis if indicated; doesn't appear to be fluid overloaded    Thank you for the consult and allowing us to participate in the care of your patient. Case discussed with nurse and patient/family. Questions and concerns addressed. Meds and Orders reviewed. Electronically signed by     Sanjuanita Foy on 4/16/2019 at 2:41 PM    Addendum by Dr. Hakeem Portillo MD:  I have seen and examined the patient independently. Face to face evaluation and examination was performed. The above evaluation and note has been reviewed. Labs and radiographs were reviewed. I Have discussed with Dr. Sanjuanita Foy, Medical student about this patient in detail. The above assessment and plan has been reviewed. Please see my modifications mentioned below. My modifications:    Chest Xray done today I.e 4/16/19:      Plan:  -Stop steroids.  -Flonase 50mcg/INH 2sprays each nostril daily. She  was informed about adverse effects of Flonase. She was demonstrated in my office how to use Flonase. She verbalizes understanding.  -Speech path eval to r/o silent aspiration. -CT sinuses. -CT chest with out contrast. ? Hiatal hernial causing left lower lobe density. -Aspiration precautions.  -Will schedule Taylor Burgess for nocturnal polysomnogram (Sleep study) with baseline protocol at [facility] sleep lab after discharge from the current hospitalization. Patient to follow with Ms. Rashid Rufino. Lexi Claudio CNP/ my ( Dr. Darline Harris sleep clinic at [address], [address] for Pulmonary disease in 1 week after the sleep study to go over the study reports and further management options.        Tarik Oliveira MD 4/16/2019 6:01 PM

## 2019-04-16 NOTE — CARE COORDINATION
19, 7:29 AM      Vamsi Puga       Admitted from: ER 4/15/2019/ [address] 283 day: 1   Location: - Reason for admit: Acute congestive heart failure with [facility] ventricular diastolic [facility] [I50.31] Status: Inpt  Admit order signed?: yes  PMH:  has a past medical history of Blood transfusion reaction, CAD (coronary artery disease), CHF (congestive heart failure) ([facility] CKD (chronic kidney disease) stage 3, GFR 30-59 ml/min ([facility] GERD (gastroesophageal reflux disease), History of bleeding ulcers, Hypertension, Pneumonia, and Thyroid disease. Procedure: No  Pertinent abnormal Imagin19 CXR     Mildly worsened left lower lobe pneumonia versus atelectasis. Medications:  Scheduled Meds:   amLODIPine  5 mg Oral Daily    aspirin  81 mg Oral Daily    vitamin D  1 tablet Oral Daily    isosorbide mononitrate  30 mg Oral Daily    Magnesium Oxide  500 mg Oral Daily    metoprolol tartrate  25 mg Oral BID    pantoprazole  40 mg Oral QAM AC    sodium chloride flush  10 mL Intravenous 2 times per day    furosemide  20 mg Intravenous Q8H    heparin (porcine)  5,000 Units Subcutaneous 3 times per day    albuterol  2.5 mg Nebulization Q4H WA    potassium chloride  10 mEq Oral Q8H    meropenem  1 g Intravenous Q8H    doxycycline hyclate  100 mg Oral 2 times per day    levothyroxine  88 mcg Oral Daily    methylPREDNISolone  40 mg Intravenous Q8H     Continuous Infusions:   Pertinent Info/Orders/Treatment Plan:  Telemetry. Acapella. Solumedrol. Doxycycline po and IV Merem. IV Lasix. Dietary consult. Nebulizers. GFR 35. Creat 1.4. Diet: DIET CARDIAC;   Smoking status:  reports that she has never smoked.  She has never used smokeless tobacco.   PCP: Clarine Goldmann, MD  Readmission: No  Readmission Risk Score: 19%    Discharge Planning  Current Residence:  Private Residence  Living Arrangements:  Alone   Support Systems:  Children, Family Members  Current Services PTA:

## 2019-04-16 NOTE — PLAN OF CARE
Problem: Impaired respiratory status  Goal: Clear lung sounds  Outcome: Ongoing   Breath sounds are diminished with rhonchi and expiratory wheezes. Continue with treatments to help improve breath sounds.

## 2019-04-16 NOTE — CARE COORDINATION
DISCHARGE BARRIERS  19, 10:41 AM    Reason for Referral: \"Desires HH. [facility]\"  Mental Status: Patient is alert and oriented  Decision Making: Patient makes own decisions  Family/Social/Home Environment: Spoke with patient, assessment completed. Patient lives alone in a one story home. She has a walk in shower with a seat and grab bars. She pays someone to do heavy cleaning once per month. She does her own cooking, light cleaning, and light laundry and drives limitedly. Patients son and daughter-in-law live across the street and 3 daughters are out of state. Patien tis agreeable to [place] nursing services. Current Services: none  Current Equipment:walker and cane  Payment Source:Medicare and [employer]  Concerns or Barriers to Discharge: none  Collabrative List of ECF/HH were provided: decline, has used [employer] I the past and is her preference now. Teach Back Method used with patient regarding care plan and discharge planning  Patient verbalize understanding of the plan of care and contribute to goal setting. Anticipated Needs/Discharge Plan: Patient plans to return home alone with new [address] [facility] services.     Electronically signed by Heron Apgar, LSW on 2019 at 10:41 AM

## 2019-04-17 PROBLEM — K44.9 LARGE HIATAL HERNIA: Status: ACTIVE | Noted: 2019-01-01

## 2019-04-17 NOTE — PROGRESS NOTES
[facility]  SPEECH THERAPY  STRZ MED SURG 8A  Bedside Swallowing Evaluation      SLP Individual Minutes  Time In: 8421  Time Out: 8612  Minutes: 18  Timed Code Treatment Minutes: 0 Minutes       Date: 2019  Patient Name: Andres Perry      CSN: [id number]   : 1926  (80 y.o.)  Gender: female   Referring Physician:  Dr. Josselin Stone  Diagnosis: shortness of breath; pneumonia; CHF   Secondary Diagnosis:  Dysphagia   History of Present Illness/Injury: Pt presents with the above diagnosis; see physician H&P for further history. Concern for aspiration secondary to admitting diagnosis; ST to complete BSE to assess swallow function and determine least restrictive PO diet.    Past Medical History:   Diagnosis Date    Blood transfusion reaction     CAD (coronary artery disease)     [facility]congest[facility]     CKD (chronic kidney disease) stage 3, GFR 30-59 ml/min ([facility]) 2015    GERD (gastroesophageal reflux disease)     History of bleeding ulcers     Hypertension     Large hiatal hernia 2019    Pneumonia     Thyroid disease        Pain:  None reported     Current Diet: General with thin liquids    Respiratory Status: [] Independent [x] Nasal Cannula [] Oxygen Mask      [] Tracheostomy [] Other:     [] Ventilator/Settings:    Behavioral Observation: [x] Alert [x] Oriented [] Confused [] Lethargic      [] Dysarthric [] Limited Direction Following [] Agitated      [] Other:    ORAL MECHANISM EVALUATION:         Comments:  Facial / Labial [x]WFL [] Impaired []DNT    Lingual [x]WFL [] Impaired []DNT    Dentition []WFL [x] Impaired []DNT Sparse upper dentition; reports no dentures   Velum [x]WFL [] Impaired []DNT    Vocal Quality [x]WFL [] Impaired []DNT    Sensation []WFL [] Impaired [x]DNT    Cough []WFL [] Impaired [x]DNT    Other: []WFL [] Impaired []DNT    Other: []WFL [] Impaired []DNT        PATIENT WAS EVALUATED USING:  Ice chips, thin liquid via cup and straw, puree and hard solid     ORAL PHASE: [] WFL  [x] Impaired   [] Loss of bolus from lips [] Impaired AP movement [] Pocketing Left   [] Pocketing Right  [] Lingual Pumping  [x] Impaired Mastication secondary to sparse upper dentition   [] Reduced Bolus Formation [] Impaired Oral Initiation    [] OTHER:    PHARYNGEAL PHASE: [x] WFL: Pharyngeal phase appears WFLs, but cannot completely rule out pharyngeal phase deficits from a bedside swallow evaluation alone. [] Impaired   [] Delayed Swallow  [] Decreased Hyolaryngeal Elevation [] Audible Swallow   [] Suspected Pharyngeal Residue due to spontaneous multiple swallow. [] OTHER:    SIGNS AND SYMPTOMS OF LARYNGEAL PENETRATION / ASPIRATION:  [x] NO sign/symptoms of aspiration evident with this evaluation, but cannot rule out silent aspiration. [] Throat Clear  [] Immediate Cough [] Delayed Cough [] Wet Vocal Quality  [] Change in Pulmonary Status  [] OTHER:    IMPRESSIONS: Pt presents with apparently intact swallow function. Pt with chronic, deep cough at baseline, as reported by NORMA Allen. Pt with cough present prior to, throughout and upon conclusion of PO intake. Mildly impaired mastication of hard solids secondary to sparse upper dentition; pt requiring increased time for adequate mastication of solid. Pt with adequate A-P transfer, timely initiation of swallow reflex and good hyolaryngeal elevation. No concern for aspiration. Daughter and pt provided education re: modified diet levels (dental soft) secondary to pt impaired dentition. Pt and daughter receptive to education, though decided to continue on regular diet as pt is aware of food she is and is not able to eat successfully. Recommend continued regular diet with thin liquids. ST services no longer warranted due to pt apparently intact swallow mechanism.  In the event of medical decline, ST to complete instrumental assessment to assess integrity of laryngeal swallow mechanism. RECOMMENDATIONS:     Modified Barium Swallow: [] Is indicated to further assess    [x] Is NOT indicated at this time; Will recommend as        appropriate. DIET RECOMMENDATIONS:  General with thin liquids    EDUCATION:   Learner: [x]Patient [] Significant other [x] Son/Daughter [] Parent     [] Other:   Education: [x] Reviewed results and recommendations of this evaluation     [x] Reviewed diet and strategies     [x] Reviewed signs, symptoms and risk of aspiration     [] Demonstrated how to thick liquid appropriately. [x] Reviewed goals and Plan of Care     [] OTHER:   Method: [x] Discussion [x] Demonstration [] Hand-out     [] OTHER:   Evaluation of Education:     [x] Verbalizes understanding [] Demonstrates with assistance     [x] Demonstrates without assistance []Needs further instruction     [] No evidence of learning  [] Family not present        PLAN / TREATMENT RECOMMENDATIONS:  [x] No further speech therapy services indicated.            JENNIFER Araujo. - Student Intern

## 2019-04-17 NOTE — PROGRESS NOTES
[facility] THERAPY MISSED TREATMENT NOTE  STRZ MED SURG 8A      Date: 2019  Patient Name: Aleks Puga        MRN: [id number]    : 1926  (80 y.o.)    REASON FOR MISSED TREATMENT:  ST attempted to complete BSE this date. Upon arrival to pt room, pt off floor for completion of CT scan. Will attempt again later this date as schedule permits or tomorrow, .     JENNIFER Dubois. - Student Intern

## 2019-04-17 NOTE — PROGRESS NOTES
Patient has been doing acapella on own.  Good technique observed turned over to patient to do on own

## 2019-04-17 NOTE — PLAN OF CARE
Problem: Falls - Risk of:  Goal: Will remain free from falls  Description  Will remain free from falls  Outcome: Ongoing  Note:   Patient  free from falls this shift. Up with assistance and walker. Skid proof footwear on while up. Bed alarm on. Patient alert and oriented     Problem: Falls - Risk of:  Goal: Absence of physical injury  Description  Absence of physical injury  Outcome: Ongoing  Note:   Patient free from physical injury this shift. Bed in lowest position. Bedside table, call light, and personal possessions within reach     Problem: Impaired respiratory status  Goal: Clear lung sounds  Outcome: Ongoing  Note:   Lung sounds are diminished and have some rhonchi     Problem: HEMODYNAMIC STATUS  Goal: Patient has stable vital signs and fluid balance  Outcome: Ongoing  Note:   Patient is taking in oral fluids and voiding adequate amounts of urine     Problem: FLUID AND ELECTROLYTE IMBALANCE  Goal: Fluid and electrolyte balance are achieved/maintained  Outcome: Ongoing  Note:   Patient's electrolytes are within normal limits currently     Problem: ACTIVITY INTOLERANCE/IMPAIRED MOBILITY  Goal: Mobility/activity is maintained at optimum level for patient  Outcome: Ongoing  Note:   Patient is able to move well with one assistance and walker     Problem: DISCHARGE BARRIERS  Goal: Patient's continuum of care needs are met  Outcome: Ongoing  Note:   Patient to be discharged to home with help of family and possibly home health     Problem: Nutrition  Goal: Optimal nutrition therapy  Outcome: Ongoing  Note:   Patient is taking in foods and fluids without difficulty   Care plan reviewed with patient. Patient verbalize understanding of the plan of care and contribute to goal setting.

## 2019-04-17 NOTE — PLAN OF CARE
Problem: Falls - Risk of:  Goal: Will remain free from falls  Description  Will remain free from falls  4/17/2019 1154 by Bert Amaya RN  Outcome: Met This Shift  Note:   Patient is awake and alert and oriented to person, place and time. Utilizing bed and chair alarms. Side rails are up x3. Patient is oout of bed with front wheeled walker and assist x1. Patient is weak. PT/OT ordered. No falls noted. Hourly rounding complete     Problem: Impaired respiratory status  Goal: Clear lung sounds  4/17/2019 1154 by Bert Amaya RN  Outcome: Met This Shift     Problem: Falls - Risk of:  Goal: Absence of physical injury  Description  Absence of physical injury  4/17/2019 0220 by Andree Cruz RN  Outcome: Ongoing  Note:   Patient free from physical injury this shift. Bed in lowest position. Bedside table, call light, and personal possessions within reach     Problem: ACTIVITY INTOLERANCE/IMPAIRED MOBILITY  Goal: Mobility/activity is maintained at optimum level for patient  4/17/2019 1154 by Bert Amaya RN  Outcome: Ongoing  Note:   Patient states has fallen at home prior to being admitted to hospital. Patient states did get dizzy when fell. Son states she has a front wheeled walker at home and has just gotten her a four wheeled walker with seat     Problem: DISCHARGE BARRIERS  Goal: Patient's continuum of care needs are met  4/17/2019 1154 by Bert Amaya RN  Outcome: Ongoing  Note:   Social service consulted for home needs. Son in room. No thoughts of whereabouts at discharge     Problem: Nutrition  Goal: Optimal nutrition therapy  4/17/2019 1154 by Bert Amaya RN  Outcome: Ongoing  Note:   Patient is receiving a carb counting diet. Patient and family states has not been eating or drinking at home. Megace started. Dietary consult.  Patient to sit in chair for meals

## 2019-04-17 NOTE — PROGRESS NOTES
has a significant second hand smoking history via . Had PFT with Dr. Taye Loya   Lives at home close to family but requires no help in ADLs. Usually requires 2L NC at night and exertion at home. She is having shortness of breath: Yes  Onset: gradual   Duration:3weeks. Diurnal variation:  None. Current functional capacity on level ground: Very limited on level ground. She can climb steps: None. Flights of steps she can climb: No  She admits to orthopnea. She admits to paroxysmal nocturnal dyspnea. She is having cough: Yes  Duration of cough: for 3 weeks  Her cough is associated with sputum production: Yes   The sputum color: white  Hemoptysis:No  Diurnal variation: None. Relieving factors: No  Aggravating factors: No.    She denies any symptoms suggestive of aspiration. No symptoms of GERD. Sleeping habits:  Time to go to bed: 11:00         PM  Time to wake up: 6:00        AM    Sleep History:  Pt with history of: She is currently living alone.   Morning headache:Yes   Dryness of mouth in the morning:Yes  Hx of snoring:No  Witnessed apneas:No  Excessive day time sleepiness:No. See below for Clifton score  Hypnogogic Hallucinations:NO  Hypnopompic Hallucinations:NO  Symptoms suggestive of Restless leg syndrome:NO  History of Seizures:NO  Sleep Walking:NO  Sleep Talking:NO  Sleep paralysis: NO  Cataplexy: NO      Clifton Sleepiness Score:   Sitting and readin  Watching TV:1  Sitting inactive in a public place:0  Being a passenger in a motor vehicle for an hour or more:1  Lying down in the afternoon:1  Sitting and talking to someone:0  Sitting quietly after lunch (no alcohol):1  Stopped for a few minutes in traffic while drivin  Total Score:4    Mallampati airway Class:IV  Neck Circumference:18.0 Inches      Past 24 hrs    -Had long coughing spell this afternoon, no cough during exam  -Reports no CP  -Cough mostly non-productive small expectoration after extended time  All there Active member of club or organization: Not on file     Attends meetings of clubs or organizations: Not on file     Relationship status: Not on file    Intimate partner violence:     Fear of current or ex partner: Not on file     Emotionally abused: Not on file     Physically abused: Not on file     Forced sexual activity: Not on file   Other Topics Concern    Not on file   Social History Narrative    Not on file     Family History          Problem Relation Age of Onset    Heart Disease Mother     Early Death Mother     Heart Disease Father     Cancer Sister     Heart Disease Sister     Heart Disease Brother     Substance Abuse Brother     Substance Abuse Brother      Sleep History    Refused PSG in the past with Dr. Debbie Shultz. Meds    Current Medications    pantoprazole  40 mg Oral BID AC    furosemide  20 mg Intravenous BID    potassium chloride  10 mEq Oral BID    fluticasone  2 spray Each Nare Daily    amLODIPine  5 mg Oral Daily    aspirin  81 mg Oral Daily    vitamin D  1 tablet Oral Daily    isosorbide mononitrate  30 mg Oral Daily    Magnesium Oxide  500 mg Oral Daily    metoprolol tartrate  25 mg Oral BID    sodium chloride flush  10 mL Intravenous 2 times per day    heparin (porcine)  5,000 Units Subcutaneous 3 times per day    albuterol  2.5 mg Nebulization Q4H WA    meropenem  1 g Intravenous Q8H    doxycycline hyclate  100 mg Oral 2 times per day    levothyroxine  88 mcg Oral Daily     benzonatate, guaiFENesin-dextromethorphan, acetaminophen, ALPRAZolam, nitroGLYCERIN, sodium chloride flush, magnesium hydroxide, ondansetron  IV Drips/Infusions    Vitals    Vitals    height is 4' 11\" (1.499 m) and weight is 187 lb 11.2 oz (85.1 kg). Her oral temperature is 97.8 °F (36.6 °C). Her blood pressure is 147/67 (abnormal) and her pulse is 79. Her respiration is 18 and oxygen saturation is 95%.      O2 Flow Rate (L/min): 2 L/min  I/O    Intake/Output Summary (Last 24 hours) at 4/17/2019 1558  Last data filed at 4/17/2019 1434  Gross per 24 hour   Intake 1220 ml   Output 300 ml   Net 920 ml     Patient Vitals for the past 96 hrs (Last 3 readings):   Weight   04/16/19 0538 187 lb 11.2 oz (85.1 kg)   04/15/19 1024 186 lb (84.4 kg)     Exam   Physical Exam   Constitutional: No distress on O2 per NC. Elderly female noted truncal obesity. Head: Normocephalic and atraumatic. Eyes: Conjunctivae are normal. Pupils are equal, round. No scleral icterus. Neck: Neck supple. No tracheal deviation present. Cardiovascular: S1 and S2 with no murmur. No peripheral edema  Pulmonary/Chest: Normal effort with bilateral air entry, faint intermittent expiratory wheeze. No stridor. No respiratory distress. Patient exhibits no tenderness. Abdominal: Soft. Bowel sounds audible. No distension or tenderness to palp. Musculoskeletal: Moves all extremities  Neurological: Patient is alert and oriented to person, place, and time. Skin: Warm and dry.      Labs   ABG  Lab Results   Component Value Date    PH 7.40 11/14/2015    PO2 102 11/14/2015    PCO2 47 11/14/2015    HCO3 29 11/14/2015    O2SAT 98 11/14/2015     No results found for: IFIO2, MODE, SETTIDVOL, SETPEEP  CBC  Recent Labs     04/15/19  1108 04/16/19  0633   WBC 7.7 5.3   RBC 4.48 4.73   HGB 13.3 13.8   HCT 39.9 41.7   MCV 89.1 88.2   MCH 29.7 29.2   MCHC 33.3 33.1    207   MPV 9.9 9.9      BMP  Recent Labs     04/15/19  1108 04/16/19  0633 04/17/19  0407    141 138   K 4.2 4.0 3.9   CL 96* 98 97*   CO2 32 30 29   BUN 25* 23* 31*   CREATININE 1.3* 1.4* 1.4*   GLUCOSE 104 178* 149*   MG  --  2.2 2.3   CALCIUM 9.4 9.6 9.4     LFT  Recent Labs     04/15/19  1108   AST 18   ALT 8*   BILITOT 0.8   ALKPHOS 96     TROP  Lab Results   Component Value Date    TROPONINT < 0.010 04/15/2019    TROPONINT < 0.010 04/15/2019    TROPONINT < 0.010 01/10/2019     BNP  Lab Results   Component Value Date    PROBNP 6076.0 04/17/2019    PROBNP 1402.0 04/15/2019    PROBNP 7128.0 11/17/2017     D-Dimer  Lab Results   Component Value Date    DDIMER 1767.82 11/16/2015     Lactic Acid  Recent Labs     04/15/19  1211   LACTA 0.9     INR  No results for input(s): INR, PROTIME in the last 72 hours. PTT  No results for input(s): APTT in the last 72 hours. Glucose  Recent Labs     04/15/19  2003   POCGLU 106     UA No results for input(s): [place], COLORU, CLARITYU, MUCUS, PROTEINU, BLOODU, RBCUA, WBCUA, BACTERIA, NITRU, GLUCOSEU, BILIRUBINUR, UROBILINOGEN, KETUA, LABCAST, LABCASTTY, AMORPHOS in the last 72 hours. Invalid input(s): CRYSTALS. PFTs                 Echo    Transthoracic Echocardiography Report (TTE)  Radha Cortez MD 1/10/2019     Summary   Normal left ventricle size and systolic function. Ejection fraction was   estimated at -50   55-%. There were no regional left ventricular wall motion abnormalities   and wall thickness was within normal limits.   The aortic valve was trileaflet with increase thickness and cuspal   separation. DOPPLER: Transaortic velocity was within the normal range with   no evidence of aortic stenosis. There was mild aortic regurgitation.   The mitral valve structure was calcified with normal leaflet separation.   DOPPLER: The transmitral velocity was within the normal range with no   evidence for mitral stenosis. There was mild mitral regurgitation. Cultures    Procalcitonin  Lab Results   Component Value Date    PROCAL 0.08 04/15/2019    PROCAL < 0.05 11/15/2015       Respiratory Culture:   Respiratory Culture [[id number]] Collected: 04/15/19 2055   Order Status: Completed Specimen: Sputum Expectorated Updated: 04/16/19 7986    Respiratory Culture Normal kika- preliminary    Gram Stain Result Quality of sputum specimen: Specimen acceptable. Few segmented neutrophils observed. Rare epithelial cells observed. Many gram positive cocci occurring singly and in pairs. Many gram positive bacilli.    Few gram negative bacilli. Narrative:       Blood culture 1: no growth preliminary   Blood Culture 2:  no growth preliminary   Legionella Ag: pending  Strep Ag: pending  Influenza A/B: negative    Radiology    CXR    4/16/19:  PROCEDURE: XR CHEST PORTABLE  Impression       Mildly worsened left lower lobe pneumonia versus atelectasis. 4/15/19:  PROCEDURE: XR CHEST PORTABLE  Impression   1. Moderate cardiomegaly. Moderate-sized hiatus hernia. 2. Mild retrocardiac atelectasis/pneumonia with a small associated effusion. Questionable minimal infiltrate right infrahilar region. No pulmonary vascular congestion seen. CT Scans    CT CHEST WO CONTRAST   4/17/2019   FINDINGS:   The central airway is patent. There is a large hiatal hernia. The remaining visualized upper abdominal structures are within normal limits. There is mild cardiomegaly. There is no pericardial effusion. There are coronary artery calcifications. There is aneurysmal dilatation of the ascending aorta measuring up to 4.4 cm. There are atherosclerotic calcifications throughout the aorta. There is mild dilatation of the main pulmonary artery measuring 3.4 cm, a finding which can be seen with pulmonary arterial hypertension. There are no pathologically enlarged mediastinal, axillary or hilar lymph nodes. There are opacities at the left lung base which are thought to represent an 8 pneumonic consolidation. There is no evidence for honeycombing. There is no significant bronchiectasis. There is generalized osteopenia. There are no acute fractures. Impression:  1. Opacities at the left lung base thought to be on the basis of a pneumonic consolidation. 2. Dilatation of the main pulmonary artery which can be seen with pulmonary arterial hypertension. 3. Aneurysmal dilatation of the ascending aorta measuring 4.4 cm. 4. Cardiomegaly. 5. Large hiatal hernia.            CT sinuses  4/17/2019   FINDINGS:   Frontal sinuses: Normally aerated and pneumatized. The frontal ethmoidal recesses are patent. Ethmoid air cells: Normally aerated and pneumatized. The lamina papyracea are intact. The cribriform plates and fovea ethmoidalis are relatively symmetric. Sphenoid sinus: Normally aerated and pneumatized. Mucosal coaptation causes obstruction of the right sphenoethmoidal recess and narrowing of the left sphenoethmoidal recess. Maxillary sinuses: Normally aerated and pneumatized. The ostiomeatal units are patent. Nasal cavity: The nasal septum is deviated towards the right. The nasal turbinates are within appropriate limits. No polyps or masses are noted. The mastoid air cells and middle ear cavities are normally aerated. There are no suspicious findings in the imaged aspects of the brain parenchyma and facial soft tissues. Note is made of extensive atherosclerotic calcifications involving the bilateral petrous, cavernous and clinoid internal carotid arteries. There is absence of the native lenses and senile calcifications within the bilateral orbits. Impression:  No evidence to suggest acute sinusitis. Additional findings are as further described in the body of the report. (See actual reports for details)      Assessment   Chronic cough due to allergic rhinitis Vs sinusitis Vs GERD Vs Asthma less likely with poor response to steroids and nebs  -Allergic rhinitis-   Abnormal chest Xray- ? Hiatal hernia Vs atelectasis. Pnemonia less likely with normal serum Pro calcitonin level. -? Silent aspiration-SLP eval no evidence of aspiration  -Chronic diastolic CHF I.e HFpEF.  -Unspecified sleep apnea. She was advised to have a sleep study by Dr. Annabelle Vega in the past. How ever she never had sleep study so far. -CAD (coronary artery disease). -CKD (chronic kidney disease) stage 3, GFR 30-59 ml/min ([facility] [address]   -GERD (gastroesophageal reflux disease)    Recommendations   -Stable on 2L NC, Afebrile, No new complaints  -SLP eval and recommendations appreciated  -Prominent hiatal hernia GI consult  -Abx as per primary: Meropenem + Doxycycline  -Albuterol nebulizer  -Robitussin DM for cough  -Tessalon pearls PRN for cough    Case discussed with nurse and patient/family. Questions and concerns addressed. Meds and Orders reviewed. Electronically signed by   Risa Ganser, APRN - CNP on 4/17/2019 at 3:58 PM    Addendum by Dr. Ruth Simmonds, MD:  I have seen and examined the patient independently. Face to face evaluation and examination was performed. The above evaluation and note has been reviewed. Labs and radiographs were reviewed. I Have discussed with Mr. Jorge Muniz CNP about this patient in detail. The above assessment and plan has been reviewed. Please see my modifications mentioned below. My modifications:  Improving cough. Will stop Meropenam- All cultures were negative. Aspiration precautions  GI consult . Need optimization of CHF by primary service.       Deidre Mulligan MD 4/17/2019 6:11 PM

## 2019-04-17 NOTE — PROGRESS NOTES
INTERNAL MEDICINE SPECIALTIES  Progress Note For Dr Franky Lee       Patient:  Hernando Gayle  YOB: 1926  Date of Service: 4/17/2019  MRN: [id number]   Acct:  [de-identified]   Primary Care Physician: Chelsie Sneed MD    SUBJECTIVE: still coughing quite a bit despite robitussin, has allergy to codiene    Home Medications:   No current facility-administered medications on file prior to encounter. Current Outpatient Medications on File Prior to Encounter   Medication Sig Dispense Refill    metoprolol tartrate (LOPRESSOR) 25 MG tablet Take 25 mg by mouth 2 times daily      aspirin 81 MG chewable tablet Take 1 tablet by mouth daily 30 tablet 3    isosorbide mononitrate (IMDUR) 30 MG extended release tablet Take 1 tablet by mouth daily 30 tablet 3    pantoprazole (PROTONIX) 40 MG tablet Take 1 tablet by mouth every morning (before breakfast) 30 tablet 3    Potassium 99 MG TABS Take 1 tablet by mouth daily       Magnesium 500 MG TABS Take 1 tablet by mouth daily       Cholecalciferol (VITAMIN D3) 1000 units CAPS Take 1 tablet by mouth daily       bumetanide (BUMEX) 2 MG tablet Take 1 tablet by mouth daily 30 tablet 3    acetaminophen 650 MG TABS Take 650 mg by mouth every 4 hours as needed. 120 tablet 3    albuterol (PROAIR HFA) 108 (90 BASE) MCG/ACT inhaler Inhale 2 puffs into the lungs every 6 hours as needed for Wheezing. 1 Inhaler 3    amLODIPine (NORVASC) 10 MG tablet Take 0.5 tablets by mouth nightly. (Patient taking differently: Take 5 mg by mouth nightly Pt takes in the morning.) 30 tablet 3    ALPRAZolam (XANAX) 0.25 MG tablet Take 0.25 mg by mouth 3 times daily as needed for Anxiety.  nitroGLYCERIN (NITROSTAT) 0.4 MG SL tablet Place 1 tablet under the tongue every 5 minutes as needed for Chest pain. 25 tablet 0    levothyroxine (SYNTHROID) 100 MCG tablet Take 1 tablet by mouth Daily.  30 tablet 0         Scheduled Meds:   furosemide  20 mg Intravenous BID    potassium chloride  10 mEq Oral BID    fluticasone  2 spray Each Nare Daily    amLODIPine  5 mg Oral Daily    aspirin  81 mg Oral Daily    vitamin D  1 tablet Oral Daily    isosorbide mononitrate  30 mg Oral Daily    Magnesium Oxide  500 mg Oral Daily    metoprolol tartrate  25 mg Oral BID    pantoprazole  40 mg Oral QAM AC    sodium chloride flush  10 mL Intravenous 2 times per day    heparin (porcine)  5,000 Units Subcutaneous 3 times per day    albuterol  2.5 mg Nebulization Q4H WA    meropenem  1 g Intravenous Q8H    doxycycline hyclate  100 mg Oral 2 times per day    levothyroxine  88 mcg Oral Daily     Continuous Infusions:  PRN Meds:guaiFENesin-dextromethorphan, acetaminophen, ALPRAZolam, nitroGLYCERIN, sodium chloride flush, magnesium hydroxide, ondansetron        Allergies: Avelox [moxifloxacin]; Pcn [penicillins]; Prednisone; Ranexa [ranolazine]; Rocephin [ceftriaxone sodium-lidocaine]; Sulfur; Zithromax [azithromycin]; and Codeine    OBJECTIVE:    Vitals:   Vitals:    04/17/19 0000   BP: (!) 142/71   Pulse: 81   Resp: 18   Temp: 97.7 °F (36.5 °C)   SpO2: 97%      BMI: Body mass index is 37.91 kg/m². PHYSICAL EXAMINATION:            General appearance: Appears stated age and cooperative. HEENT:  Normal cephalic, atraumatic without obvious deformity. Pupils equal, round, and reactive to light. Neck: Supple   Respiratory:much less Wheezes/Rhonchi. Cardiovascular:  Regular rhythm with normal S1/S2 without rubs or gallops. Abdomen: Soft, non-tender, non-distended with normal bowel sounds. Musculoskeletal:  No clubbing, cyanosis  trace edema bilaterally. Skin: Skin color, texture, turgor normal.  No rashes or lesions.   Neurologic: Alert and oriented, Neurovascularly intact without any focal motor deficits.           Review of Labs and Diagnostic Testing:    Recent Results (from the past 24 hour(s))   Basic Metabolic Panel    Collection Time: 04/17/19  4:07 AM   Result Value Ref Range Sodium 138 135 - 145 meq/L    Potassium 3.9 3.5 - 5.2 meq/L    Chloride 97 (L) 98 - 111 meq/L    CO2 29 23 - 33 meq/L    Glucose 149 (H) 70 - 108 mg/dL    BUN 31 (H) 7 - 22 mg/dL    CREATININE 1.4 (H) 0.4 - 1.2 mg/dL    Calcium 9.4 8.5 - 10.5 mg/dL   Magnesium    Collection Time: 04/17/19  4:07 AM   Result Value Ref Range    Magnesium 2.3 1.6 - 2.4 mg/dL   Brain Natriuretic Peptide    Collection Time: 04/17/19  4:07 AM   Result Value Ref Range    Pro-BNP 6076.0 (H) 0.0 - 1800.0 pg/mL   Anion Gap    Collection Time: 04/17/19  4:07 AM   Result Value Ref Range    Anion Gap 12.0 8.0 - 16.0 meq/L   Glomerular Filtration Rate, Estimated    Collection Time: 04/17/19  4:07 AM   Result Value Ref Range    Est, Glom Filt Rate 35 (A) ml/min/1.73m2       Radiology:     Xr Chest Portable    Result Date: 4/16/2019  PROCEDURE: XR CHEST PORTABLE CLINICAL INFORMATION: f/u CHF. COMPARISON: Chest x-ray dated 4/15/2019 TECHNIQUE: AP Portable upright chest xray FINDINGS: Lungs/pleura: There is mildly worsened left lower lobe atelectasis or pneumonia. Right lung is clear. No pleural effusion. No pneumothorax. Heart: There is stable mild cardiomegaly. Mediastinum/irma: No change in the large hiatal hernia. The irma are unremarkable. Skeleton: No acute bone or joint abnormality. Bones are osteopenic. Lines/tubes/devices: none     Mildly worsened left lower lobe pneumonia versus atelectasis. **This report has been created using voice recognition software. It may contain minor errors which are inherent in voice recognition technology. ** Final report electronically signed by Dr. Radames Meza on 4/16/2019 5:19 AM    Xr Chest Portable    Result Date: 4/15/2019  PROCEDURE: XR CHEST PORTABLE CLINICAL INFORMATION: cough, short of breath, hx CHF COMPARISON: 11/17/2017 TECHNIQUE: A single mobile view of the chest was obtained. 1. Moderate cardiomegaly. Moderate-sized hiatus hernia.  2. Mild retrocardiac atelectasis/pneumonia with a small

## 2019-04-18 NOTE — PROGRESS NOTES
Spoke with Dr. Abby Sofia via telephone. Noted new onset of +2 right swelling on the right side of patient's right lower leg. No discoloration or calf tenderness present. Patient also appears to have increase of generalized edema. Reported this to physician with new orders received for Doppler of right lower extremity & Lasix 20mg IV once.

## 2019-04-18 NOTE — PROGRESS NOTES
INTERNAL MEDICINE SPECIALTIES  Progress Note For Dr Caitie Araya       Patient:  Nathalia Gill  YOB: 1926  Date of Service: 4/18/2019  MRN: [id number]   Acct:  [de-identified]   Primary Care Physician: Hernandez Pickering MD    SUBJECTIVE: Coughing is much better    Home Medications:   No current facility-administered medications on file prior to encounter. Current Outpatient Medications on File Prior to Encounter   Medication Sig Dispense Refill    metoprolol tartrate (LOPRESSOR) 25 MG tablet Take 25 mg by mouth 2 times daily      aspirin 81 MG chewable tablet Take 1 tablet by mouth daily 30 tablet 3    isosorbide mononitrate (IMDUR) 30 MG extended release tablet Take 1 tablet by mouth daily 30 tablet 3    pantoprazole (PROTONIX) 40 MG tablet Take 1 tablet by mouth every morning (before breakfast) 30 tablet 3    Potassium 99 MG TABS Take 1 tablet by mouth daily       Magnesium 500 MG TABS Take 1 tablet by mouth daily       Cholecalciferol (VITAMIN D3) 1000 units CAPS Take 1 tablet by mouth daily       bumetanide (BUMEX) 2 MG tablet Take 1 tablet by mouth daily 30 tablet 3    acetaminophen 650 MG TABS Take 650 mg by mouth every 4 hours as needed. 120 tablet 3    albuterol (PROAIR HFA) 108 (90 BASE) MCG/ACT inhaler Inhale 2 puffs into the lungs every 6 hours as needed for Wheezing. 1 Inhaler 3    amLODIPine (NORVASC) 10 MG tablet Take 0.5 tablets by mouth nightly. (Patient taking differently: Take 5 mg by mouth nightly Pt takes in the morning.) 30 tablet 3    ALPRAZolam (XANAX) 0.25 MG tablet Take 0.25 mg by mouth 3 times daily as needed for Anxiety.  nitroGLYCERIN (NITROSTAT) 0.4 MG SL tablet Place 1 tablet under the tongue every 5 minutes as needed for Chest pain. 25 tablet 0    levothyroxine (SYNTHROID) 100 MCG tablet Take 1 tablet by mouth Daily.  30 tablet 0         Scheduled Meds:   pantoprazole  40 mg Oral BID AC    furosemide  20 mg Intravenous BID    potassium chloride  10 mEq Oral BID    fluticasone  2 spray Each Nare Daily    amLODIPine  5 mg Oral Daily    aspirin  81 mg Oral Daily    vitamin D  1 tablet Oral Daily    isosorbide mononitrate  30 mg Oral Daily    Magnesium Oxide  500 mg Oral Daily    metoprolol tartrate  25 mg Oral BID    sodium chloride flush  10 mL Intravenous 2 times per day    heparin (porcine)  5,000 Units Subcutaneous 3 times per day    albuterol  2.5 mg Nebulization Q4H WA    doxycycline hyclate  100 mg Oral 2 times per day    levothyroxine  88 mcg Oral Daily     Continuous Infusions:  PRN Meds:benzonatate, guaiFENesin-dextromethorphan, acetaminophen, ALPRAZolam, nitroGLYCERIN, sodium chloride flush, magnesium hydroxide, ondansetron        Allergies: Avelox [moxifloxacin]; Pcn [penicillins]; Prednisone; Ranexa [ranolazine]; Rocephin [ceftriaxone sodium-lidocaine]; Sulfur; Zithromax [azithromycin]; and Codeine    OBJECTIVE:    Vitals:   Vitals:    04/18/19 0351   BP: (!) 158/86   Pulse: 72   Resp: 19   Temp: 97.5 °F (36.4 °C)   SpO2: 96%      BMI: Body mass index is 37.37 kg/m². PHYSICAL EXAMINATION:            General appearance: Appears stated age and cooperative. HEENT:  Normal cephalic, atraumatic without obvious deformity. Pupils equal, round, and reactive to light. Neck: Supple   Respiratory: Minimal wheeze left upper posterior lung   Cardiovascular:  Regular rhythm with normal S1/S2 without rubs or gallops. Abdomen: Soft, non-tender, non-distended with normal bowel sounds. Musculoskeletal:  No clubbing, cyanosis  trace edema bilaterally. Skin: Skin color, texture, turgor normal.  No rashes or lesions.   Neurologic: Alert and oriented, Neurovascularly intact without any focal motor deficits.           Review of Labs and Diagnostic Testing:    Recent Results (from the past 24 hour(s))   Basic Metabolic Panel    Collection Time: 04/18/19  4:33 AM   Result Value Ref Range    Sodium 140 135 - 145 meq/L    Potassium 3.7 3.5 - 5.2 meq/L Chloride 93 (L) 98 - 111 meq/L    CO2 34 (H) 23 - 33 meq/L    Glucose 101 70 - 108 mg/dL    BUN 36 (H) 7 - 22 mg/dL    CREATININE 1.4 (H) 0.4 - 1.2 mg/dL    Calcium 9.5 8.5 - 10.5 mg/dL   Magnesium    Collection Time: 04/18/19  4:33 AM   Result Value Ref Range    Magnesium 2.3 1.6 - 2.4 mg/dL   Anion Gap    Collection Time: 04/18/19  4:33 AM   Result Value Ref Range    Anion Gap 13.0 8.0 - 16.0 meq/L   Glomerular Filtration Rate, Estimated    Collection Time: 04/18/19  4:33 AM   Result Value Ref Range    Est, Glom Filt Rate 35 (A) ml/min/1.73m2       Radiology:     Xr Chest Portable    Result Date: 4/16/2019  PROCEDURE: XR CHEST PORTABLE CLINICAL INFORMATION: f/u CHF. COMPARISON: Chest x-ray dated 4/15/2019 TECHNIQUE: AP Portable upright chest xray FINDINGS: Lungs/pleura: There is mildly worsened left lower lobe atelectasis or pneumonia. Right lung is clear. No pleural effusion. No pneumothorax. Heart: There is stable mild cardiomegaly. Mediastinum/irma: No change in the large hiatal hernia. The irma are unremarkable. Skeleton: No acute bone or joint abnormality. Bones are osteopenic. Lines/tubes/devices: none     Mildly worsened left lower lobe pneumonia versus atelectasis. **This report has been created using voice recognition software. It may contain minor errors which are inherent in voice recognition technology. ** Final report electronically signed by Dr. Adria Singletary on 4/16/2019 5:19 AM    Xr Chest Portable    Result Date: 4/15/2019  PROCEDURE: XR CHEST PORTABLE CLINICAL INFORMATION: cough, short of breath, hx CHF COMPARISON: 11/17/2017 TECHNIQUE: A single mobile view of the chest was obtained. 1. Moderate cardiomegaly. Moderate-sized hiatus hernia. 2. Mild retrocardiac atelectasis/pneumonia with a small associated effusion. Questionable minimal infiltrate right infrahilar region. No pulmonary vascular congestion seen. **This report has been created using voice recognition software.   It may contain minor errors which are inherent in voice recognition technology. ** Final report electronically signed by Dr. Pretty Huerta on 4/15/2019 11:18 AM    ECHO: 1/10/19  Summary   Normal left ventricle size and systolic function. Ejection fraction was   estimated at -50   55-%. There were no regional left ventricular wall motion abnormalities   and wall thickness was within normal limits.   The aortic valve was trileaflet with increase thickness and cuspal   separation. DOPPLER: Transaortic velocity was within the normal range with   no evidence of aortic stenosis. There was mild aortic regurgitation.   The mitral valve structure was calcified with normal leaflet separation.   DOPPLER: The transmitral velocity was within the normal range with no   evidence for mitral stenosis. There was mild mitral regurgitation.      CHEST CT     Impression       1. Opacities at the left lung base thought to be on the basis of a pneumonic consolidation. 2. Dilatation of the main pulmonary artery which can be seen with pulmonary arterial hypertension. 3. Aneurysmal dilatation of the ascending aorta measuring 4.4 cm. 4. Cardiomegaly. 5. Large hiatal hernia.         CT SINUSES:     Impression       No evidence to suggest acute sinusitis. Additional findings are as further described in the body of the report. ASSESMENT:      Active Problems:    CHF (congestive heart failure) (HCC)    Pneumonia    Acute congestive heart failure with left ventricular diastolic dysfunction (HCC)    Large hiatal hernia    Chronic cough    Abnormal chest x-ray    Sleep apnea  Resolved Problems:    * No resolved hospital problems. *      PLAN:  No aspiration per Speech therapist evaluation .  Continue on bronchodilators, O2, broad spectrum antibiotics to cover community acquired organisms, steroids f/u on legionella and strep pneumonia urinary antigens, blood cultures, sputum gram stain c/s,  rapid influenza screen was negative,  Maintain onIncentive spirometry , acapella   guiafenesin,  Appreciate pulmonary consult. Added tessalon perles  CT chest and sinuses as noted above, if no improvement discuss possible bronchoscopy     Cautious diuresis, transition lasix tooral bumex ,last ECHO from 3 months ago as noted above,  trended troponin negative,   monitor weight, O2 per protocol ;Cardiology consult  pending. CXR in a.m , PT/OT,home health and home O2 eval prior to d/c.     Code status discussed -  LIMITED        DVT prophylaxis: [] Lovenox                                 [] SCDs                                 [x] SQ Heparin                                 [] Encourage ambulation, low risk for DVT, no chemical or mechanical prophylaxis necessary              [] Already on Anticoagulation                Anticipated Disposition upon discharge: [x] Home                                                                         [] Home with Home Health                                                                         [] [facility]                                                                         [] [address]          Electronically signed by Nuzhat Lopez MD on 4/18/2019 at 7:46 AM

## 2019-04-18 NOTE — PROGRESS NOTES
Aravind Khoury 60  INPATIENT OCCUPATIONAL THERAPY  [facility] MED SURG 8A  EVALUATION    Time:  Time In: 1107  Time Out: 1133  Timed Code Treatment Minutes: 16 Minutes  Minutes: 26          Date: 2019  Patient Name: Jan Chino,   Gender: female      MRN: [id number]  : 1926  (80 y.o.)  Referring Practitioner: Dr. Arely Watson  Diagnosis: Acute CHF with L ventricular diastolic dysfunction  Additional Pertinent Hx: 80 y.o. female who presents to the Emergency Department for the evaluation of a productive cough that began three weeks ago. The patient states the constant cough came on gradually and is associated with decreased appetite, muscle weakness, and shortness of breath and is constant. Patient chronically experiences leg and ankle swelling due to congestive heart failure. She denies pain, headache, chest pain, and abdominal discomfort. The patient saw her PCP, Dr. Kade Arora, last Monday and chest X-ray revealed acute heart failure per patient's daughter. The patient was told to double her diuretic at home with no improvement of her symptoms. She has also tried tessalon pearls and albuterol sulfate inhaler with no symptom relief. The patient denies fever, chill, nausea, vomiting, lightheadedness, dizziness, hemoptysis, and sick contacts. Patient has a history of congestive heart failure, hypertension, GERD, coronary artery disease, and chronic kidney disease for which she is being treated.      Restrictions/Precautions:  General Precautions          Past Medical History:   Diagnosis Date    Blood transfusion reaction     CAD (coronary artery disease)     CHF [facility]congestive heart failure) ([facility])     CKD (chronic kidney disease) stage 3, GFR 30-59 ml/min ([facility]) 2015    GERD (gastroesophageal reflux disease)     History of bleeding ulcers     Hypertension     Large hiatal hernia 2019    Pneumonia     Thyroid disease      Past Surgical History:   Procedure Laterality Date    APPENDECTOMY  BREAST SURGERY Left 1959    cyst removal     CHOLECYSTECTOMY      COLONOSCOPY      ENDOSCOPY, COLON, DIAGNOSTIC      EYE SURGERY Bilateral 2006    cataract removal    FRACTURE SURGERY Right 1986    R arm fx    HYSTERECTOMY      TONSILLECTOMY             Subjective  Chart Reviewed: Yes(full med chart)  Patient assessed for rehabilitation services?: Yes  Family / Caregiver Present: Yes    Subjective: Very pleasant, motivated, eager to go home, reports feeling stronger  Comments: RN ok'd OT    General:  Overall Orientation Status: Within Functional Limits    Vision: Within Functional Limits    Hearing: Within functional limits         Pain:  Pain Assessment  Patient Currently in Pain: Denies       Social/Functional History:  Lives With: Alone  Home Layout: One level  Home Equipment: Rolling walker, 4 wheeled walker, Alert Button, Lift chair, Oxygen(PM use only for oxygen)     Bathroom Shower/Tub: Walk-in shower  Bathroom Toilet: Handicap height  Bathroom Equipment: Built-in shower seat(has a RW positioned in walkin shower to assist with mobility)  Bathroom Accessibility: Accessible       ADL Assistance: [address] [place] [address] Avenue: Independent(heavy housework and heavy laundry done by family/houskeeper)       Ambulation Assistance: Independent(recently started using rollator d/t weakness)  Transfer Assistance: Independent    Active : Yes(limited driving, family assists with MD appts)  Leisure & Hobbies: play cards       Objective        Overall Cognitive Status: WFL         Sensation  Overall Sensation Status: WFL               LUE AROM (degrees)  LUE AROM : WFL          RUE AROM (degrees)  RUE AROM : WFL       LUE Strength  LUE Strength Comment: generalized weakness, grossly 4-/5                RUE Strength  RUE Strength Comment: generalized weakness, grossly 4-/5        ADL  Grooming: Modified independent (hand hygiene)  Toileting: Modified independent           Transfers  Sit to stand: Modified independent  Stand to sit: Modified independent  Toilet Transfers  Toilet - Technique: Ambulating  Equipment Used: Standard toilet  Toilet Transfer: Modified independent    Balance  Sitting Balance: Modified independent   Standing Balance: Modified independent      Time: 1.5 mins hand hygiene, static stand 2 mins     Functional Mobility  Functional - Mobility Device: Rolling Walker  Activity: To/from bathroom; Other(onto nursing unit at [facility])  Assist Level: Modified independent   Functional Mobility Comments: fair pace, steady, no LOB or SOB           Activity Tolerance:  Activity Tolerance: Patient Tolerated treatment well  Activity Tolerance: Good tolerance, no SOB    Treatment Initiated:  Pt completed ADLs, as depicted above and functional mobility at [facility] with no safety concerns, LOB or SOB. Pt and family education provided RE: Home Health therapies and deny needs at this time, also verblzied good understanding to home safety education and ECT. Assessment:  Assessment: Pt presents to OT complete functional mobility and ADL transfers at [facility] with no cues required for safety. Pt demonstrated toileting and grooming tasks at [facility]. Pt currently does not warrant further skilled OT intervention at this time and anticipate pt safe to d/c home with family assist as needed. Please re-consult should there be a change in pt status  Prognosis: Good  No Skilled OT: Safe to return home    Clinical Decision Making: Clinical Decision making was of Low Complexity as the result of analysis of data from a problem focused assessment, a consideration of a limited number of treatment options, no significant comorbidities affecting the plan of care and no modification or assistance required to complete the evaluation.     Discharge Recommendations:  Discharge Recommendations: Home with assist PRN    Patient Education:  Patient Education: Role of OT, d/c planning, POC, ECT, home safety and fall prevention  Barriers to Learning: none    Equipment Recommendations:  Equipment Needed: No    Safety:  Safety Devices in place: Yes  Type of devices: Left in chair, Call light within reach, Chair alarm in place, Nurse notified, All fall risk precautions in place(family present)    Plan:  Times per week: NA    Goals:  Patient goals : To go home independently, denies need for [facility] therapies    Short term goals  Time Frame for Short term goals: NA     Evaluation Complexity: Based on the findings of patient history, examination, clinical presentation, and decision making during this evaluation, this patient is of low complexity.

## 2019-04-18 NOTE — PROGRESS NOTES
Patient stable throughout night shift. Abdomen rounded & soft with active bowel sounds. Passing flatus. Had small, formed bowel movement. Continues to have Hiatal Hernia which patient states she does not want surgical intervention on. No nausea or vomiting. Good oral intake. Patient continues to have +1 generalized edema. Upon initial assessment noticed new onset of +2 pitting edema to the anterior/medial side of right lower extremity. Skin appeared normal in color, but tight & shiny. Denied pain in area. Called Dr. Lilian Diaz and notified her of findings. New order received for 1 time does of Lasix 20mg IV. Given. Patient voided excellent amount through shift and her generalized swelling and right lower extremity swelling decreased. She still does have some swelling in the RLE, however it is now a +1 instead of a +2. Venous Doppler of RLE was negative for clot. Bases of lungs clear posteriorly, however I&E wheezes heard intermittently in bilateral upper lungs. Continues to receive scheduled breathing TX. Took x 1 Tessalon for congested constant cough. Cough continues to be productive and chronic in nature. On oral Doxycycline. Dr. Joy Fernández of Pulmonolgy in this AM to see patient. Stated he would see patient in 2-3 months outpatient with repeat chest x-ray and that patient is to have script at discharge to make sure she has completed a full 7-8 days of the Doxycycline. Alert & oriented x 4. Appropriate safety awareness. Up x 1 with walker. No falls occurred this shift. Patient stable.

## 2019-04-18 NOTE — CONSULTS
[address] CONSULT      Patient: Anju Cross  : 1926  Acct#: [de-identified]  Consult seen for:   Anju Cross is a 80 y.o. , female with large hiatal hernia, cough        ASSESSMENT:     1. CHF exacerbation; reason for admission  2. Large hiatal hernia   3. Chronic Cough; believed to be secondary to reflux vs allergic rhinitis (clear sputum per pt)   4. Pneumonia (per H/P)  5. Shortness of breath with pulmonary edema noted last week  6. GERD  7. Oxygen NC at HS per patient and is wearing now due to sob complaints/unable to be weaned  8. HTN  9. CAD  10. CKD        PLAN:   1. Continue Protonix bid 40 mg-she denied heartburn, dysphagia or acid coming up in mouth   2. For HH, sit upright after eating and eat smaller meals  3. If hiatal hernia is a concern, then she would need OP surgical referral for Linx with DR Whitley (she may be too high risk for procedure, but she could discuss with him). Pt said that she does not want surgery. 4. Last EGD : done for epigastric pain, by DR Viviana Villarreal. Minimal distal esophagitis, gastritis, large hiatal hernia and few erosions in stomach. To be on Protonix 40 mg bid and Carafate. She was on Plavix, so no bx obtained  5. Can add Carafate since patient was on before after above EGD  6. Patient is high risk for EGD/endoscopy due to age and co morbidities   7. Pulmonary following  8. She can follow up as OP with [facility] in 3-4 weeks if needed (due to age, she may not be best at getting to appt); DR Viviana Villarreal or [facility] CNP  9. Gi signing off. Allayne Freya HISTORY OF PRESENT ILLNESS     patient sitting in bed eating toast and oatmeal. She is already sitting up at about 90 degree angle. She has no heartburn, no dysphagia and no complaints of reflux. SHe is aware that she has a large hiatal hernia and is not interested in surgery even if she was a surgical candidate.  She is wearing oxygen nasal cannula and said that she wears it at night 04/18/19  0433   MG 2.3     Phosphorus:No results for input(s): PHOS in the last 72 hours. Troponin: No results for input(s): CKTOTAL, CKMB, TROPONINI in the last 72 hours. PT/INR:   No results for input(s): PROTIME, INR in the last 72 hours. REVIEW OF SYSTEMS:    GENERAL:  No fever, chills or weight loss. EYES:  No  blurred vision, double vision  CARDIOVASCULAR:  No chest pain or palpitations. RESPIRATORY:  + dyspnea pta but on O2 now, + cough. Clear sputum   GI: GERD, HH   MUSCULOSKELETAL:  No new painful or swollen joints or myalgias. :   No dysuria or hematuria. SKIN:  No rashes or jaundice. NEUROLOGIC:   No headaches or  seizures  PSYCH:  No anxiety or depression. ENDOCRINE:   No polyuria or polydipsia. BLOOD:  No anemia, bleeding disorder, blood or blood product transfusion. PHYSICAL EXAMINATION:      Vitals:    04/18/19 0351   BP: (!) 158/86   Pulse: 72   Resp: 19   Temp: 97.5 °F (36.4 °C)   SpO2: 96%     GEN: Well nourished, well developed. BMI 37  LUNGS:  Clear but diminished to auscultation bilaterally. Chest rises equally on inspiration. O2 NC  CARDIOVASCULAR:  Regular rate and rhythm without murmurs, rubs or gallops. ABDOMEN:  Soft, moderately larger, nontender and nondistended with normal bowel sounds. EYES: LITA. Extraoccular motions intact bilaterally. ENT:  Ears symmetric, Neck supple, trachea midline, moist mucous membranes     EXTREMITIES:  No cyanosis or rash   DERM:  No rash or jaundice. NEURO:  Alert and oriented x4. Patient moves upper extremities without any limitations  PSYCHIATRIC: calm, pleasant. Very nice    HOME MEDICATIONS      Prior to Admission medications    Medication Sig Start Date End Date Taking?  Authorizing Provider   metoprolol tartrate (LOPRESSOR) 25 MG tablet Take 25 mg by mouth 2 times daily   Yes Historical Provider, MD   aspirin 81 MG chewable tablet Take 1 tablet by mouth daily 1/12/19  Yes Kia Wells MD   isosorbide mononitrate (IMDUR) 30 MG extended release tablet Take 1 tablet by mouth daily 1/12/19  Yes Tesha Johnson MD   pantoprazole (PROTONIX) 40 MG tablet Take 1 tablet by mouth every morning (before breakfast) 1/12/19  Yes Tesha Johnson MD   Potassium 99 MG TABS Take 1 tablet by mouth daily    Yes Historical Provider, MD   Magnesium 500 MG TABS Take 1 tablet by mouth daily    Yes Historical Provider, MD   Cholecalciferol (VITAMIN D3) 1000 units CAPS Take 1 tablet by mouth daily    Yes Historical Provider, MD   bumetanide (BUMEX) 2 MG tablet Take 1 tablet by mouth daily 11/18/15  Yes Nj Cardona MD   acetaminophen 650 MG TABS Take 650 mg by mouth every 4 hours as needed. 1/13/15  Yes Farzana Peters MD   albuterol (PROAIR HFA) 108 (90 BASE) MCG/ACT inhaler Inhale 2 puffs into the lungs every 6 hours as needed for Wheezing. 1/13/15  Yes Farzana Peters MD   amLODIPine (NORVASC) 10 MG tablet Take 0.5 tablets by mouth nightly. Patient taking differently: Take 5 mg by mouth nightly Pt takes in the morning. 6/7/14  Yes Tesha Johnson MD   ALPRAZolam Lucina Karuna) 0.25 MG tablet Take 0.25 mg by mouth 3 times daily as needed for Anxiety. Yes Historical Provider, MD   nitroGLYCERIN (NITROSTAT) 0.4 MG SL tablet Place 1 tablet under the tongue every 5 minutes as needed for Chest pain. 11/2/13  Yes Toyin Harper MD   levothyroxine (SYNTHROID) 100 MCG tablet Take 1 tablet by mouth Daily.  11/2/13  Yes Toyin Harper MD       MEDICATIONS    Scheduled Meds:   pantoprazole  40 mg Oral BID AC    furosemide  20 mg Intravenous BID    potassium chloride  10 mEq Oral BID    fluticasone  2 spray Each Nare Daily    amLODIPine  5 mg Oral Daily    aspirin  81 mg Oral Daily    vitamin D  1 tablet Oral Daily    isosorbide mononitrate  30 mg Oral Daily    Magnesium Oxide  500 mg Oral Daily    metoprolol tartrate  25 mg Oral BID    sodium chloride flush  10 mL Intravenous 2 times per day    heparin (porcine)  5,000 Units Subcutaneous 3 times per day    albuterol  2.5 mg Nebulization Q4H WA    doxycycline hyclate  100 mg Oral 2 times per day    levothyroxine  88 mcg Oral Daily     Continuous Infusions:  PRN Meds:.benzonatate, guaiFENesin-dextromethorphan, acetaminophen, ALPRAZolam, nitroGLYCERIN, sodium chloride flush, magnesium hydroxide, ondansetron    ALLERGIES   is allergic to avelox [moxifloxacin]; pcn [penicillins]; prednisone; ranexa [ranolazine]; rocephin [ceftriaxone sodium-lidocaine]; sulfur; zithromax [azithromycin]; and codeine. SOCIAL HISTORY    Social History  Social History     Socioeconomic History    Marital status:      Spouse name: None    Number of children: None    Years of education: None    Highest education level: None   Occupational History    None   Social Needs    Financial resource strain: None    Food insecurity:     Worry: None     Inability: None    Transportation needs:     Medical: None     Non-medical: None   Tobacco Use    Smoking status: Never Smoker    Smokeless tobacco: Never Used   Substance and Sexual Activity    Alcohol use: No    Drug use: No    Sexual activity: Never   Lifestyle    Physical activity:     Days per week: None     Minutes per session: None    Stress: None   Relationships    Social connections:     Talks on phone: None     Gets together: None     Attends Baptism service: None     Active member of club or organization: None     Attends meetings of clubs or organizations: None     Relationship status: None    Intimate partner violence:     Fear of current or ex partner: None     Emotionally abused: None     Physically abused: None     Forced sexual activity: None   Other Topics Concern    None   Social History Narrative    None       FAMILY HISTORY    family history includes Cancer in her sister; Early Death in her mother; Heart Disease in her brother, father, mother, and sister; Substance Abuse in her brother and brother.          REVIEW OF DIAGNOSTIC TESTING

## 2019-04-18 NOTE — PROGRESS NOTES
and symbicort inhaler with no symptom relief. Patient denies smoking. States she has a significant second hand smoking history via . Had PFT with Dr. Mauricio Sorensen 2013  Lives at home close to family but requires no help in ADLs. Usually requires 2L NC at night and exertion at home. She is having shortness of breath: Yes  Onset: gradual   Duration:3weeks. Diurnal variation:  None. Current functional capacity on level ground: Very limited on level ground. She can climb steps: None. Flights of steps she can climb: No  She admits to orthopnea. She admits to paroxysmal nocturnal dyspnea. She is having cough: Yes  Duration of cough: for 3 weeks  Her cough is associated with sputum production: Yes   The sputum color: white  Hemoptysis:No  Diurnal variation: None. Relieving factors: No  Aggravating factors: No.    She denies any symptoms suggestive of aspiration. No symptoms of GERD. Sleeping habits:  Time to go to bed: 11:00         PM  Time to wake up: 6:00        AM    Sleep History:  Pt with history of: She is currently living alone.   Morning headache:Yes   Dryness of mouth in the morning:Yes  Hx of snoring:No  Witnessed apneas:No  Excessive day time sleepiness:No. See below for Henning score  Hypnogogic Hallucinations:NO  Hypnopompic Hallucinations:NO  Symptoms suggestive of Restless leg syndrome:NO  History of Seizures:NO  Sleep Walking:NO  Sleep Talking:NO  Sleep paralysis: NO  Cataplexy: NO      Henning Sleepiness Score:   Sitting and readin  Watching TV:1  Sitting inactive in a public place:0  Being a passenger in a motor vehicle for an hour or more:1  Lying down in the afternoon:1  Sitting and talking to someone:0  Sitting quietly after lunch (no alcohol):1  Stopped for a few minutes in traffic while drivin  Total Score:4    Mallampati airway Class:IV  Neck Circumference:18.0 Inches      Past 24 hrs    -still has productive cough  -denies increasing SOB, chest pain, fever or chills  -Stable on 2LPM  -(+) BLE edema    All there systems reviewed    Past Medical History         Diagnosis Date    Blood transfusion reaction     CAD (coronary artery disease)     [facility]     CKD (chronic kidney disease) stage 3, GFR 30-59 ml/min ([facility]) 1/2/2015    GERD (gastroesophageal reflux disease)     History of bleeding ulcers     Hypertension     Large hiatal hernia 4/17/2019    Pneumonia     Thyroid disease       Past Surgical History           Procedure Laterality Date    APPENDECTOMY      BREAST SURGERY Left 1959    cyst removal     CHOLECYSTECTOMY      COLONOSCOPY      ENDOSCOPY, COLON, DIAGNOSTIC      EYE SURGERY Bilateral 2006    cataract removal    FRACTURE SURGERY Right 1986    R arm fx    HYSTERECTOMY      TONSILLECTOMY       Diet    DIET CARDIAC; Dietary Nutrition Supplements: Standard High Calorie Oral Supplement  Allergies    Avelox [moxifloxacin]; Pcn [penicillins]; Prednisone; Ranexa [ranolazine]; Rocephin [ceftriaxone sodium-lidocaine]; Sulfur; Zithromax [azithromycin]; and Codeine  Social History     Social History     Socioeconomic History    Marital status:       Spouse name: Not on file    Number of children: Not on file    Years of education: Not on file    Highest education level: Not on file   Occupational History    Not on file   Social Needs    Financial resource strain: Not on file    Food insecurity:     Worry: Not on file     Inability: Not on file    Transportation needs:     Medical: Not on file     Non-medical: Not on file   Tobacco Use    Smoking status: Never Smoker    Smokeless tobacco: Never Used   Substance and Sexual Activity    Alcohol use: No    Drug use: No    Sexual activity: Never   Lifestyle    Physical activity:     Days per week: Not on file     Minutes per session: Not on file    Stress: Not on file   Relationships    Social connections:     Talks on phone: Not on file     Gets together: L/min  I/O    Intake/Output Summary (Last 24 hours) at 4/18/2019 1446  Last data filed at 4/18/2019 0930  Gross per 24 hour   Intake 510 ml   Output 800 ml   Net -290 ml     Patient Vitals for the past 96 hrs (Last 3 readings):   Weight   04/18/19 0342 185 lb (83.9 kg)   04/16/19 0538 187 lb 11.2 oz (85.1 kg)   04/15/19 1024 186 lb (84.4 kg)     Exam   Physical Exam   Constitutional: No distress on O2 2LPM per NC. Patient appears moderately built and moderately nourished. Neck: Neck supple. No tracheal deviation present. Cardiovascular: HRRR. S1 and S2 with no murmur. No peripheral edema  Pulmonary/Chest: RRR. Normal effort with bilateral air entry, clear breath sounds. No stridor. No respiratory distress. Patient exhibits no tenderness. Abdominal: Soft. Bowel sounds audible. No distension or tenderness to palp. Musculoskeletal: Moves all extremities  Neurological: Patient is alert and oriented to person, place, and time. Skin: Warm and dry. No cyanosis. Labs   ABG  Lab Results   Component Value Date    PH 7.40 11/14/2015    PO2 102 11/14/2015    PCO2 47 11/14/2015    HCO3 29 11/14/2015    O2SAT 98 11/14/2015     No results found for: IFIO2, MODE, SETTIDVOL, SETPEEP  CBC  Recent Labs     04/16/19  0633   WBC 5.3   RBC 4.73   HGB 13.8   HCT 41.7   MCV 88.2   MCH 29.2   MCHC 33.1      MPV 9.9      BMP  Recent Labs     04/16/19  0633 04/17/19  0407 04/18/19  0433    138 140   K 4.0 3.9 3.7   CL 98 97* 93*   CO2 30 29 34*   BUN 23* 31* 36*   CREATININE 1.4* 1.4* 1.4*   GLUCOSE 178* 149* 101   MG 2.2 2.3 2.3   CALCIUM 9.6 9.4 9.5     LFT  No results for input(s): AST, ALT, ALB, BILITOT, ALKPHOS, LIPASE in the last 72 hours. Invalid input(s):   AMYLASE  TROP  Lab Results   Component Value Date    TROPONINT < 0.010 04/15/2019    TROPONINT < 0.010 04/15/2019    TROPONINT < 0.010 01/10/2019     BNP  Lab Results   Component Value Date    PROBNP 6076.0 04/17/2019    PROBNP 1402.0 04/15/2019 PROBNP 7128.0 11/17/2017     D-Dimer  Lab Results   Component Value Date    DDIMER 1767.82 11/16/2015     Lactic Acid  No results for input(s): LACTA in the last 72 hours. INR  No results for input(s): INR, PROTIME in the last 72 hours. PTT  No results for input(s): APTT in the last 72 hours. Glucose  Recent Labs     04/15/19  2003   POCGLU 106     UA No results for input(s): Elinore Golder, COLORU, CLARITYU, MUCUS, PROTEINU, BLOODU, RBCUA, WBCUA, BACTERIA, NITRU, GLUCOSEU, BILIRUBINUR, UROBILINOGEN, KETUA, LABCAST, LABCASTTY, AMORPHOS in the last 72 hours. Invalid input(s): CRYSTALS. PFTs                 Echo    Transthoracic Echocardiography Report (TTE)  Radha Duarte MD 1/10/2019     Summary   Normal left ventricle size and systolic function. Ejection fraction was   estimated at -50   55-%. There were no regional left ventricular wall motion abnormalities   and wall thickness was within normal limits.   The aortic valve was trileaflet with increase thickness and cuspal   separation. DOPPLER: Transaortic velocity was within the normal range with   no evidence of aortic stenosis. There was mild aortic regurgitation.   The mitral valve structure was calcified with normal leaflet separation.   DOPPLER: The transmitral velocity was within the normal range with no   evidence for mitral stenosis. There was mild mitral regurgitation. Cultures    Procalcitonin  Lab Results   Component Value Date    PROCAL 0.08 04/15/2019    PROCAL < 0.05 11/15/2015       Respiratory Culture:   Respiratory Culture [[id number]] Collected: 04/15/19 2055   Order Status: Completed Specimen: Sputum Expectorated Updated: 04/16/19 8817    Respiratory Culture Normal kika- preliminary    Gram Stain Result Quality of sputum specimen: Specimen acceptable. Few segmented neutrophils observed. Rare epithelial cells observed. Many gram positive cocci occurring singly and in pairs. Many gram positive bacilli.    Few gram negative bacilli. Narrative:       Blood culture 1: no growth preliminary   Blood Culture 2:  no growth preliminary   Legionella Ag: pending  Influenza A/B: negative    Strep pneumoniae: (-)      Radiology    CXR    4/16/19:  PROCEDURE: XR CHEST PORTABLE  Impression       Mildly worsened left lower lobe pneumonia versus atelectasis. 4/15/19:  PROCEDURE: XR CHEST PORTABLE  Impression   1. Moderate cardiomegaly. Moderate-sized hiatus hernia. 2. Mild retrocardiac atelectasis/pneumonia with a small associated effusion. Questionable minimal infiltrate right infrahilar region. No pulmonary vascular congestion seen. CT Scans    CT CHEST WO CONTRAST   4/17/2019   FINDINGS:   The central airway is patent. There is a large hiatal hernia. The remaining visualized upper abdominal structures are within normal limits. There is mild cardiomegaly. There is no pericardial effusion. There are coronary artery calcifications. There is aneurysmal dilatation of the ascending aorta measuring up to 4.4 cm. There are atherosclerotic calcifications throughout the aorta. There is mild dilatation of the main pulmonary artery measuring 3.4 cm, a finding which can be seen with pulmonary arterial hypertension. There are no pathologically enlarged mediastinal, axillary or hilar lymph nodes. There are opacities at the left lung base which are thought to represent an 8 pneumonic consolidation. There is no evidence for honeycombing. There is no significant bronchiectasis. There is generalized osteopenia. There are no acute fractures. Impression:  1. Opacities at the left lung base thought to be on the basis of a pneumonic consolidation. 2. Dilatation of the main pulmonary artery which can be seen with pulmonary arterial hypertension. 3. Aneurysmal dilatation of the ascending aorta measuring 4.4 cm. 4. Cardiomegaly. 5. Large hiatal hernia.            CT sinuses  4/17/2019   FINDINGS:   Frontal sinuses: Normally aerated and pneumatized. The frontal ethmoidal recesses are patent. Ethmoid air cells: Normally aerated and pneumatized. The lamina papyracea are intact. The cribriform plates and fovea ethmoidalis are relatively symmetric. Sphenoid sinus: Normally aerated and pneumatized. Mucosal coaptation causes obstruction of the right sphenoethmoidal recess and narrowing of the left sphenoethmoidal recess. Maxillary sinuses: Normally aerated and pneumatized. The ostiomeatal units are patent. Nasal cavity: The nasal septum is deviated towards the right. The nasal turbinates are within appropriate limits. No polyps or masses are noted. The mastoid air cells and middle ear cavities are normally aerated. There are no suspicious findings in the imaged aspects of the brain parenchyma and facial soft tissues. Note is made of extensive atherosclerotic calcifications involving the bilateral petrous, cavernous and clinoid internal carotid arteries. There is absence of the native lenses and senile calcifications within the bilateral orbits. Impression:  No evidence to suggest acute sinusitis. Additional findings are as further described in the body of the report. (See actual reports for details)    4/17/19  RLE venous doppler (-) DVT  Assessment   Chronic cough due to allergic rhinitis Vs sinusitis Vs GERD Vs Asthma less likely with poor response to steroids and nebs- improving  -Allergic rhinitis-   Abnormal chest Xray- ? Hiatal hernia Vs atelectasis. Pnemonia less likely with normal serum Pro calcitonin level. -? Silent aspiration-SLP eval no evidence of aspiration  -Chronic diastolic CHF I.e HFpEF.  -Unspecified sleep apnea. She was advised to have a sleep study by Dr. Gilbert Manley in the past. How ever she never had sleep study so far. -CAD (coronary artery disease). -CKD (chronic kidney disease) stage 3, GFR 30-59 ml/min ([facility] [address]   -GERD (gastroesophageal reflux disease)  -DNR x4    Recommendations   -Monitor spO2 to keep > 90%  -Wean O2 as tolerated keeping > 90%  -Home O2 eval before DC   -ATB per primary  -GI input appreciated  -Albuterol nebulizer  -Robitussin DM cough  -Tessalon pearls PRN for cough  -Pulmonary will s/o   -F/U at [facility] in 3 months with Vera Romo CNP with CXR- 2 view before visit    Case discussed with nurse and patient/family. Questions and concerns addressed. Meds and Orders reviewed. Electronically signed by   LACY Lagunas CNP on 4/18/2019 at 2:46 PM    Addendum by Dr. Fili Haro MD:  I have seen and examined the patient independently. Face to face evaluation and examination was performed. The above evaluation and note has been reviewed. Labs and radiographs were reviewed. I Have discussed with Ms. DA Lagunas CNP about this patient in detail. The above assessment and plan has been reviewed. Please see my modifications mentioned below. My modifications:  Feels better. Improving shortness of breath. Follow up as above.     Jairo Blunt MD 4/18/2019 7:01 PM

## 2019-04-18 NOTE — FLOWSHEET NOTE
[facility]  PHYSICAL THERAPY MISSED TREATMENT NOTE  ACUTE CARE    Date: 2019  Patient Name: Feliberto Jama        MRN: [id number]   : 1926  (80 y.o.)  Gender: female   Referring Practitioner: Dr. Sophia Chi  Diagnosis: Acute CHF with L ventricular diastolic dysfunction         REASON FOR MISSED TREATMENT:  Per OT, patient is mod I with mobility and ADL completion, and thus does not have any skilled therapy needs. Ledy Duque, PT, DPT

## 2019-04-18 NOTE — PLAN OF CARE
Keep lungs clear throughout  Problem: Impaired respiratory status  Goal: Clear lung sounds  8/23/8921 5749 by Kelsea Mary RCP  Outcome: Ongoing  4/17/2019 1154 by Carly Carlos RN  Outcome: Met This Shift

## 2019-04-18 NOTE — CARE COORDINATION
Discharge Planning Update:          Lasix stopped and transitioned to oral Bumex, daily weights, creatinine same 1.4, BNP 6076, afebrile, pulmonary consult, added Amelie Acharya, has home oxygen which she wears at HS only, remain on oxygen cont. 2L/min this am, consulted PT and OT, plans home [facility]

## 2019-04-19 NOTE — FLOWSHEET NOTE
MD informed that patient is having chest pain. States that at home she would take an nitro for this pain. EKG is done. New orders are to give patient nitro 0.4 mg PO PRN q5min for chest pain, troponin [id number] for 3 times. Consult cardiology and a portable chest xray.       04/19/19 1421   Provider Notification   Reason for Communication Evaluate   Provider Name Dr. Annalee Tsang   Provider Notification Physician   Method of Communication Call   Response See orders   Notification Time [phone]

## 2019-04-19 NOTE — PLAN OF CARE
Problem: Impaired respiratory status  Goal: Clear lung sounds  4/19/2019 1953 by Ronald Montgomery RCP  Outcome: Met This Shift  Note:   Breath sounds clear post tx.  Albuterol ongoing to maintain clear lung sounds

## 2019-04-19 NOTE — FLOWSHEET NOTE
Dr. Cem Castillo informed that patient positive troponin of 0.060. New orders are to discontinue heparin and order lovenox 1mg/kg [id number].      04/19/19 5096   Provider Notification   Reason for Communication Critical Value (comment)   Provider Name Dr. Cem Castillo   Provider Notification Physician   Method of Communication Call   Response See orders   Notification Time [id number]

## 2019-04-19 NOTE — PLAN OF CARE
Problem: Falls - Risk of:  Goal: Will remain free from falls  Description  Will remain free from falls  Outcome: Ongoing  Note:   Patient free of falls this shift. Patient on falling star program. Fall band intact. RN visually checks on patient with hourly rounds. Patient tolerates ambulation each shift. Problem: Falls - Risk of:  Goal: Absence of physical injury  Description  Absence of physical injury  Outcome: Ongoing  Note:   Patient free from injury this shift. Problem: HEMODYNAMIC STATUS  Goal: Patient has stable vital signs and fluid balance  Outcome: Ongoing  Note:   Blood pressure WNL. Vitals monitored and recorded. Patient hemodynamically stable. Cardiac monitor in place with strips being read every four hours. Problem: FLUID AND ELECTROLYTE IMBALANCE  Goal: Fluid and electrolyte balance are achieved/maintained  Outcome: Ongoing  Note:   I & O's are monitored every shift and labs are monitored daily. Problem: ACTIVITY INTOLERANCE/IMPAIRED MOBILITY  Goal: Mobility/activity is maintained at optimum level for patient  Outcome: Ongoing  Note:   Patient tolerates ambulation with minimal assistance this shift. Problem: DISCHARGE BARRIERS  Goal: Patient's continuum of care needs are met  Outcome: Ongoing  Note:   Patient plans to return home with home health upon discharge. Problem: Nutrition  Goal: Optimal nutrition therapy  Outcome: Ongoing  Note:   Patient encouraged to consume % of meals.

## 2019-04-19 NOTE — PLAN OF CARE
Problem: Impaired respiratory status  Goal: Clear lung sounds  4/18/2019 2059 by Aamir Villegas RCP  Outcome: Ongoing  Note:   Pt has diminished lung sounds t/o  Pt is on 2 lpm via nc  Will continue with therapy as ordered to improve aeration.

## 2019-04-19 NOTE — PLAN OF CARE
Problem: Impaired respiratory status  Goal: Clear lung sounds  4/19/2019 0732 by Gwen Wheeler RCP  Outcome: Ongoing   Continue therapy to improve lung sounds.

## 2019-04-19 NOTE — CARE COORDINATION
4/19/19  1:46 PM    Chest pain with ambulation. Nitro prn ordered. Cardiology consulted. Dr. Oriana Ospina in. Follow troponin. PO Bumex. PO Doxycycline. Plans return home alone at discharge with new home health.

## 2019-04-19 NOTE — CONSULTS
has no change in her weight. The patient had a history of breast  surgery in the past, history of hysterectomy, and history of  cholecystectomy. SOCIAL HISTORY:  She denies smoking or alcohol abuse. CODE STATUS:  Limited x4. ALLERGIES:  She is allergic to PENICILLIN and CEPHALOSPORIN. PHYSICAL EXAMINATION:  VITAL SIGNS:  Showed her blood pressure is 115/62. She was in sinus  rhythm. She was afebrile. Respiratory rate was 18. NEUROLOGIC:  The patient has no focal neurological deficit. NECK:  She has no JVD. The patient has poor air exchange, both lung  fields, diffuse crepitations. CARDIOVASCULAR:  She has 2/6 systolic murmur and 1/6 diastolic murmur. ABDOMEN:  Soft. GENITAL/RECTAL:  Deferred. EXTREMITIES:  Lower limbs: There is no edema. LABORATORY DATA:  The patient's BUN is 51, creatinine 1.4, sodium 137. Her BNP was elevated at 6000. The patient's hemoglobin is 13.8,  hematocrit was 41. DIAGNOSTIC STUDIES:  The x-ray of the chest showed right lower lobe  pneumonia. IMPRESSION:  1. The patient with shortness of breath. The patient has pneumonia,  treated with antibiotics per the Medical Service. 2.  Acute exacerbation of diastolic congestive heart failure, acute on  top of the chronic. The patient is receiving IV diuretics. 3.  History of respiratory failure and she has been utilizing home O2.  4.  History of moderately severe pulmonary hypertension. 5.  History of valvular heart disease, mitral regurg, AI.  6.  History of coronary artery disease. 7.  Angina pectoris. 8.  The patient has no codes. At this point, we will increase her Imdur dosage and the patient to take  nitroglycerin p.r.n. The patient will continue _____ management she is  receiving right now. The patient will be treated medically. She is not  a candidate for any invasive procedure at this point as she is a no-code  status.     Thank you very much for allowing us to share in the management of this  patient. We will followup with you. Courtney Spicer M.D.    D: 04/19/2019 13:05:18       T: 04/19/2019 14:39:05     [email or web address]  Job#: [id number]     Doc#: [id number]    CC:  Radha Borden M.D.        Referring Service

## 2019-04-19 NOTE — PLAN OF CARE
Care plan reviewed with patient. Patient verbalize understanding of the plan of care and contribute to goal setting. Problem: Falls - Risk of:  Goal: Will remain free from falls  Description  Will remain free from falls  Outcome: Ongoing  Note:   Patient absent of falls this shift. fallling star program in place, armband in place, bed in lowest position with bed alarm on and visual hourly rounding completed. Problem: HEMODYNAMIC STATUS  Goal: Patient has stable vital signs and fluid balance  Outcome: Ongoing  Note:   Vitals assessed frequently. Blood pressure and heart rate are within normal limits. Will continue to monitor vital signs and labs. Will consult MD for abnormal results. Problem: FLUID AND ELECTROLYTE IMBALANCE  Goal: Fluid and electrolyte balance are achieved/maintained  Outcome: Ongoing  Note:   Vital signs are stable at this time. Will continue to monitor labs and intake/output and assess for signs of dehydration. Problem: ACTIVITY INTOLERANCE/IMPAIRED MOBILITY  Goal: Mobility/activity is maintained at optimum level for patient  Outcome: Ongoing  Note:    Patient uses walker when ambulating. Will continue to encourage ambulation, range of motion exercises and educate about managing a safe environment. Problem: Nutrition  Goal: Optimal nutrition therapy  Outcome: Ongoing  Note:   Patient complains of decreased appetite. Encouraged to drink ensure if not eating food.  Will continue to monitor

## 2019-04-19 NOTE — PROGRESS NOTES
INTERNAL MEDICINE SPECIALTIES  Progress Note For Dr Aurora Lu       Patient:  Blanca Barker  YOB: 1926  Date of Service: 4/19/2019  MRN: [id number]   Acct:  [de-identified]   Primary Care Physician: Juan José Lebron MD    SUBJECTIVE: Had chest pain with left shoulder discomfort    Home Medications:   No current facility-administered medications on file prior to encounter. Current Outpatient Medications on File Prior to Encounter   Medication Sig Dispense Refill    metoprolol tartrate (LOPRESSOR) 25 MG tablet Take 25 mg by mouth 2 times daily      aspirin 81 MG chewable tablet Take 1 tablet by mouth daily 30 tablet 3    isosorbide mononitrate (IMDUR) 30 MG extended release tablet Take 1 tablet by mouth daily 30 tablet 3    pantoprazole (PROTONIX) 40 MG tablet Take 1 tablet by mouth every morning (before breakfast) 30 tablet 3    Potassium 99 MG TABS Take 1 tablet by mouth daily       Magnesium 500 MG TABS Take 1 tablet by mouth daily       Cholecalciferol (VITAMIN D3) 1000 units CAPS Take 1 tablet by mouth daily       bumetanide (BUMEX) 2 MG tablet Take 1 tablet by mouth daily 30 tablet 3    acetaminophen 650 MG TABS Take 650 mg by mouth every 4 hours as needed. 120 tablet 3    albuterol (PROAIR HFA) 108 (90 BASE) MCG/ACT inhaler Inhale 2 puffs into the lungs every 6 hours as needed for Wheezing. 1 Inhaler 3    amLODIPine (NORVASC) 10 MG tablet Take 0.5 tablets by mouth nightly. (Patient taking differently: Take 5 mg by mouth nightly Pt takes in the morning.) 30 tablet 3    ALPRAZolam (XANAX) 0.25 MG tablet Take 0.25 mg by mouth 3 times daily as needed for Anxiety.  nitroGLYCERIN (NITROSTAT) 0.4 MG SL tablet Place 1 tablet under the tongue every 5 minutes as needed for Chest pain. 25 tablet 0    levothyroxine (SYNTHROID) 100 MCG tablet Take 1 tablet by mouth Daily.  30 tablet 0         Scheduled Meds:   sucralfate  1 g Oral 4x Daily AC & HS    bumetanide  1 mg Oral BID    CHEST PORTABLE CLINICAL INFORMATION: cough, short of breath, hx CHF COMPARISON: 11/17/2017 TECHNIQUE: A single mobile view of the chest was obtained. 1. Moderate cardiomegaly. Moderate-sized hiatus hernia. 2. Mild retrocardiac atelectasis/pneumonia with a small associated effusion. Questionable minimal infiltrate right infrahilar region. No pulmonary vascular congestion seen. **This report has been created using voice recognition software. It may contain minor errors which are inherent in voice recognition technology. ** Final report electronically signed by Dr. Leroy Johnson on 4/15/2019 11:18 AM    ECHO: 1/10/19  Summary   Normal left ventricle size and systolic function. Ejection fraction was   estimated at -50   55-%. There were no regional left ventricular wall motion abnormalities   and wall thickness was within normal limits.   The aortic valve was trileaflet with increase thickness and cuspal   separation. DOPPLER: Transaortic velocity was within the normal range with   no evidence of aortic stenosis. There was mild aortic regurgitation.   The mitral valve structure was calcified with normal leaflet separation.   DOPPLER: The transmitral velocity was within the normal range with no   evidence for mitral stenosis. There was mild mitral regurgitation.      CHEST CT     Impression       1. Opacities at the left lung base thought to be on the basis of a pneumonic consolidation. 2. Dilatation of the main pulmonary artery which can be seen with pulmonary arterial hypertension. 3. Aneurysmal dilatation of the ascending aorta measuring 4.4 cm. 4. Cardiomegaly. 5. Large hiatal hernia.         CT SINUSES:     Impression       No evidence to suggest acute sinusitis. Additional findings are as further described in the body of the report. Legionella Urinary Ag 04/15/2019 10:46 PM [facility]   Negative      Results for Ramila Pluck (MRN [id number]) as of 4/19/2019 12:17   Ref.  Range 4/15/2019 22:46   Strep pneumo Ag, Ur Latest Ref Range: Negative  Negative         ASSESMENT:      Active Problems:    CHF (congestive heart failure) (HCC)    Pneumonia    Acute congestive heart failure with left ventricular diastolic dysfunction (HCC)    Large hiatal hernia    Chronic cough    Abnormal chest x-ray    Sleep apnea  Resolved Problems:    * No resolved hospital problems. *      PLAN:  Start nitro for chest pain. Happens at home and responds to nitro. EKG is done. New orders are to give patient nitro 0.4 mg PO PRN q5min for chest pain, troponin f0ynnls for 3 times. Consult cardiology ( Dr Dilcia Cash) and a portable chest xray. No aspiration per Speech therapist evaluation . Continue on bronchodilators, O2, broad spectrum antibiotics to cover community acquired organisms, steroids,  legionella and strep pneumonia urinary antigens were negative, f/u blood cultures, sputum gram stain c/s,  rapid influenza screen was negative,  Maintain onIncentive spirometry , acapella   guiafenesin,  Appreciate pulmonary consult. Added tessalon perles  CT chest and sinuses as noted above, if no improvement discuss possible bronchoscopy     Cautious diuresis, transition lasix to oral bumex ,last ECHO from 3 months ago as noted above,  trended troponin negative,   monitor weight, O2 per protocol ;Cardiology consult  pending. F/u CXR  , PT/OT,home health and home O2 eval prior to d/c.     Code status discussed -  LIMITED        DVT prophylaxis: [] Lovenox                                 [] SCDs                                 [x] SQ Heparin                                 [] Encourage ambulation, low risk for DVT, no chemical or mechanical prophylaxis necessary              [] Already on Anticoagulation                Anticipated Disposition upon discharge: [x] Home                                                                         [] Home with Home Health                                                                         [] Skilled

## 2019-04-20 PROBLEM — I21.4 NSTEMI (NON-ST ELEVATED MYOCARDIAL INFARCTION) (HCC): Status: ACTIVE | Noted: 2019-01-01

## 2019-04-20 NOTE — PLAN OF CARE
Problem: Falls - Risk of:  Goal: Will remain free from falls  Description  Will remain free from falls  Outcome: Ongoing  Note:   Patient free of falls this shift. Patient on falling star program. Fall band intact. RN visually checks on patient with hourly rounds. Patient tolerates ambulation each shift.        Problem: Falls - Risk of:  Goal: Absence of physical injury  Description  Absence of physical injury  Outcome: Ongoing  Note:   Patient free from injury this shift. Problem: HEMODYNAMIC STATUS  Goal: Patient has stable vital signs and fluid balance  Outcome: Ongoing  Note:   Vitals monitored and recorded. Patient hemodynamically stable. Cardiac monitor in place with strips being read every four hours.        Problem: FLUID AND ELECTROLYTE IMBALANCE  Goal: Fluid and electrolyte balance are achieved/maintained  Outcome: Ongoing  Note:   I & O's are monitored every shift and labs are monitored daily. Problem: ACTIVITY INTOLERANCE/IMPAIRED MOBILITY  Goal: Mobility/activity is maintained at optimum level for patient  Outcome: Ongoing  Note:   Patient tolerates ambulation with minimal assistance this shift. Problem: DISCHARGE BARRIERS  Goal: Patient's continuum of care needs are met  Outcome: Ongoing  Note:   Patient plans to return home with home health upon discharge. Problem: Nutrition  Goal: Optimal nutrition therapy  Outcome: Ongoing  Note:   Patient encouraged to consume % of meals. Care plan reviewed with patient. Patient verbalizes understanding of the plan of care and contribute to goal setting.

## 2019-04-20 NOTE — PROGRESS NOTES
INTERNAL MEDICINE SPECIALTIES  Progress Note For Dr Jose Akbar       Patient:  Gail Fonseca  YOB: 1926  Date of Service: 4/20/2019  MRN: [id number]   Acct:  [de-identified]   Primary Care Physician: Evelio Rouse MD    SUBJECTIVE: feels so much better    Home Medications:   No current facility-administered medications on file prior to encounter. Current Outpatient Medications on File Prior to Encounter   Medication Sig Dispense Refill    metoprolol tartrate (LOPRESSOR) 25 MG tablet Take 25 mg by mouth 2 times daily      aspirin 81 MG chewable tablet Take 1 tablet by mouth daily 30 tablet 3    isosorbide mononitrate (IMDUR) 30 MG extended release tablet Take 1 tablet by mouth daily 30 tablet 3    pantoprazole (PROTONIX) 40 MG tablet Take 1 tablet by mouth every morning (before breakfast) 30 tablet 3    Potassium 99 MG TABS Take 1 tablet by mouth daily       Magnesium 500 MG TABS Take 1 tablet by mouth daily       Cholecalciferol (VITAMIN D3) 1000 units CAPS Take 1 tablet by mouth daily       bumetanide (BUMEX) 2 MG tablet Take 1 tablet by mouth daily 30 tablet 3    acetaminophen 650 MG TABS Take 650 mg by mouth every 4 hours as needed. 120 tablet 3    albuterol (PROAIR HFA) 108 (90 BASE) MCG/ACT inhaler Inhale 2 puffs into the lungs every 6 hours as needed for Wheezing. 1 Inhaler 3    amLODIPine (NORVASC) 10 MG tablet Take 0.5 tablets by mouth nightly. (Patient taking differently: Take 5 mg by mouth nightly Pt takes in the morning.) 30 tablet 3    ALPRAZolam (XANAX) 0.25 MG tablet Take 0.25 mg by mouth 3 times daily as needed for Anxiety.  nitroGLYCERIN (NITROSTAT) 0.4 MG SL tablet Place 1 tablet under the tongue every 5 minutes as needed for Chest pain. 25 tablet 0    levothyroxine (SYNTHROID) 100 MCG tablet Take 1 tablet by mouth Daily.  30 tablet 0         Scheduled Meds:   [START ON 4/21/2019] bumetanide  1 mg Oral Daily    clopidogrel  75 mg Oral Daily    atorvastatin 10 mg Oral Nightly    isosorbide mononitrate  30 mg Oral 2 times per day    enoxaparin  1 mg/kg Subcutaneous Q12H    sucralfate  1 g Oral 4x Daily AC & HS    pantoprazole  40 mg Oral BID AC    potassium chloride  10 mEq Oral BID    fluticasone  2 spray Each Nare Daily    amLODIPine  5 mg Oral Daily    aspirin  81 mg Oral Daily    vitamin D  1 tablet Oral Daily    Magnesium Oxide  500 mg Oral Daily    metoprolol tartrate  25 mg Oral BID    sodium chloride flush  10 mL Intravenous 2 times per day    albuterol  2.5 mg Nebulization Q4H WA    doxycycline hyclate  100 mg Oral 2 times per day    levothyroxine  88 mcg Oral Daily     Continuous Infusions:  PRN Meds:benzonatate, guaiFENesin-dextromethorphan, acetaminophen, ALPRAZolam, nitroGLYCERIN, sodium chloride flush, magnesium hydroxide, ondansetron        Allergies: Avelox [moxifloxacin]; Pcn [penicillins]; Prednisone; Ranexa [ranolazine]; Rocephin [ceftriaxone sodium-lidocaine]; Sulfur; Zithromax [azithromycin]; and Codeine    OBJECTIVE:    Vitals:   Vitals:    04/20/19 0830   BP:    Pulse:    Resp: 18   Temp:    SpO2: 96%      BMI: Body mass index is 36.88 kg/m². PHYSICAL EXAMINATION:            General appearance: Appears stated age and cooperative. HEENT:  Normal cephalic, atraumatic without obvious deformity. Pupils equal, round, and reactive to light. Neck: Supple   Respiratory: diminished air entry bases  Cardiovascular:  Regular rhythm with normal S1/S2 without rubs or gallops. Abdomen: Soft, non-tender, non-distended with normal bowel sounds. Musculoskeletal:  No clubbing, cyanosis  trace edema bilaterally. Skin: Skin color, texture, turgor normal.  No rashes or lesions.   Neurologic: Alert and oriented, Neurovascularly intact without any focal motor deficits.           Review of Labs and Diagnostic Testing:    Recent Results (from the past 24 hour(s))   Troponin    Collection Time: 04/19/19 12:34 PM   Result Value Ref Range Troponin T 0.060 (A) ng/ml   SPECIMEN REJECTION    Collection Time: 04/19/19  4:49 PM   Result Value Ref Range    Rejected Test TROPT     Reason for Rejection see below    Troponin    Collection Time: 04/19/19  5:35 PM   Result Value Ref Range    Troponin T 0.062 (A) ng/ml   SPECIMEN REJECTION    Collection Time: 04/19/19  8:50 PM   Result Value Ref Range    Rejected Test TROPT     Reason for Rejection see below    Troponin    Collection Time: 04/19/19  9:26 PM   Result Value Ref Range    Troponin T 0.071 (A) ng/ml   Basic Metabolic Panel    Collection Time: 04/20/19  4:43 AM   Result Value Ref Range    Sodium 137 135 - 145 meq/L    Potassium 4.4 3.5 - 5.2 meq/L    Chloride 92 (L) 98 - 111 meq/L    CO2 31 23 - 33 meq/L    Glucose 106 70 - 108 mg/dL    BUN 40 (H) 7 - 22 mg/dL    CREATININE 1.5 (H) 0.4 - 1.2 mg/dL    Calcium 9.5 8.5 - 10.5 mg/dL   Magnesium    Collection Time: 04/20/19  4:43 AM   Result Value Ref Range    Magnesium 2.6 (H) 1.6 - 2.4 mg/dL   Anion Gap    Collection Time: 04/20/19  4:43 AM   Result Value Ref Range    Anion Gap 14.0 8.0 - 16.0 meq/L   Glomerular Filtration Rate, Estimated    Collection Time: 04/20/19  4:43 AM   Result Value Ref Range    Est, Glom Filt Rate 32 (A) ml/min/1.73m2   Troponin    Collection Time: 04/20/19  9:30 AM   Result Value Ref Range    Troponin T 0.115 (A) ng/ml       Radiology:     Xr Chest Portable    Result Date: 4/16/2019  PROCEDURE: XR CHEST PORTABLE CLINICAL INFORMATION: f/u CHF. COMPARISON: Chest x-ray dated 4/15/2019 TECHNIQUE: AP Portable upright chest xray FINDINGS: Lungs/pleura: There is mildly worsened left lower lobe atelectasis or pneumonia. Right lung is clear. No pleural effusion. No pneumothorax. Heart: There is stable mild cardiomegaly. Mediastinum/irma: No change in the large hiatal hernia. The irma are unremarkable. Skeleton: No acute bone or joint abnormality. Bones are osteopenic.  Lines/tubes/devices: none     Mildly worsened left lower lobe CT SINUSES:     Impression       No evidence to suggest acute sinusitis. Additional findings are as further described in the body of the report. Legionella Urinary Ag 04/15/2019 10:46 PM [facility]   Negative      Results for Mirlande Haynes (MRN [id number]) as of 4/19/2019 12:17   Ref. Range 4/15/2019 22:46   Strep pneumo Ag, Ur Latest Ref Range: Negative  Negative         ASSESMENT:      Active Problems:    CHF (congestive heart failure) (HCC)    Pneumonia    Acute congestive heart failure with left ventricular diastolic dysfunction (HCC)    Large hiatal hernia    Chronic cough    Abnormal chest x-ray    Sleep apnea    NSTEMI (non-ST elevated myocardial infarction) ([facility] [address]  Resolved Problems:    * No resolved hospital problems. *      PLAN:  Likely had NSTEMI based on EKG symptoms and elevated troponin, optimize anti ischemic regimen. Seen by cardiology ( Dr Maynor Merino) The patient  Is to be treated medically. She is not a candidate for any invasive procedure at this point       No aspiration per Speech therapist evaluation . Continue on bronchodilators, O2, broad spectrum antibiotics to cover community acquired organisms, steroids,  legionella and strep pneumonia urinary antigens were negative, f/u blood cultures, sputum gram stain c/s,  rapid influenza screen was negative,  Maintain onIncentive spirometry , acapella   guiafenesin,  Appreciate pulmonary consult. Added tessalon perles   CXR improving     Transitioned iv  lasix to oral bumex ,last ECHO from 3 months ago as noted above, ,   monitor weight, O2 per protocol ;Cardiology consult  Pending. Reduce bumex due to worsening creatinine    F/u CXR  , PT/OT,home health and home O2 eval prior to d/c.     Code status discussed -  LIMITED        DVT prophylaxis: [] Lovenox                                 [] SCDs                                 [x] SQ Heparin                                 [] Encourage ambulation, low risk for DVT, no chemical or mechanical prophylaxis necessary              [] Already on Anticoagulation                Anticipated Disposition upon discharge: [x] Home                                                                         [] Home with Home Health                                                                         [] [facility]                                                                         [] [address]          Electronically signed by Greg Durham MD on 4/20/2019 at 10:56 AM

## 2019-04-20 NOTE — PLAN OF CARE
Problem: Falls - Risk of:  Goal: Will remain free from falls  Description  Will remain free from falls  Outcome: Ongoing  Note:   Patient is free from falls. Patient is using call light appropriately at this time. Problem: ACTIVITY INTOLERANCE/IMPAIRED MOBILITY  Goal: Mobility/activity is maintained at optimum level for patient  Outcome: Ongoing     Problem: DISCHARGE BARRIERS  Goal: Patient's continuum of care needs are met  Outcome: Ongoing  Note:   Pt plans to return home with c at d.c.     Problem: Nutrition  Goal: Optimal nutrition therapy  Outcome: Ongoing  Note:   Patient is tolerating a cardiac diet at this time. Care plan reviewed with patient. Patient verbalize understanding of the plan of care and contribute to goal setting.

## 2019-04-21 NOTE — PLAN OF CARE
Keep lungs clear of any wheezes  Problem: Impaired respiratory status  Goal: Clear lung sounds  5/04/1544 2203 by Demetris Muse RCP  Outcome: Ongoing  4/20/2019 0901 by Cheryle Broody, RN  Outcome: Ongoing

## 2019-04-21 NOTE — PROGRESS NOTES
INTERNAL MEDICINE SPECIALTIES  Progress Note For Dr Eli Stein       Patient:  Raiza Jackson  YOB: 1926  Date of Service: 4/21/2019  MRN: [id number]   Acct:  [de-identified]   Primary Care Physician: Sanna Chiang MD    SUBJECTIVE: overall doing better    Home Medications:   No current facility-administered medications on file prior to encounter. Current Outpatient Medications on File Prior to Encounter   Medication Sig Dispense Refill    metoprolol tartrate (LOPRESSOR) 25 MG tablet Take 25 mg by mouth 2 times daily      aspirin 81 MG chewable tablet Take 1 tablet by mouth daily 30 tablet 3    isosorbide mononitrate (IMDUR) 30 MG extended release tablet Take 1 tablet by mouth daily 30 tablet 3    pantoprazole (PROTONIX) 40 MG tablet Take 1 tablet by mouth every morning (before breakfast) 30 tablet 3    Potassium 99 MG TABS Take 1 tablet by mouth daily       Magnesium 500 MG TABS Take 1 tablet by mouth daily       Cholecalciferol (VITAMIN D3) 1000 units CAPS Take 1 tablet by mouth daily       bumetanide (BUMEX) 2 MG tablet Take 1 tablet by mouth daily 30 tablet 3    acetaminophen 650 MG TABS Take 650 mg by mouth every 4 hours as needed. 120 tablet 3    albuterol (PROAIR HFA) 108 (90 BASE) MCG/ACT inhaler Inhale 2 puffs into the lungs every 6 hours as needed for Wheezing. 1 Inhaler 3    amLODIPine (NORVASC) 10 MG tablet Take 0.5 tablets by mouth nightly. (Patient taking differently: Take 5 mg by mouth nightly Pt takes in the morning.) 30 tablet 3    ALPRAZolam (XANAX) 0.25 MG tablet Take 0.25 mg by mouth 3 times daily as needed for Anxiety.  nitroGLYCERIN (NITROSTAT) 0.4 MG SL tablet Place 1 tablet under the tongue every 5 minutes as needed for Chest pain. 25 tablet 0    levothyroxine (SYNTHROID) 100 MCG tablet Take 1 tablet by mouth Daily.  30 tablet 0         Scheduled Meds:   bumetanide  1 mg Oral Daily    clopidogrel  75 mg Oral Daily    atorvastatin  10 mg Oral Nightly antigens were negative, f/u blood cultures, sputum gram stain c/s,  rapid influenza screen was negative,  Maintain onIncentive spirometry , acapella   guiafenesin,  Appreciate pulmonary consult. Added tessalon perles   CXR improving     Transitioned iv  lasix to oral bumex ,last ECHO from 3 months ago as noted above, ,   monitor weight, O2 per protocol ;Cardiology consult  Pending. Reduce bumex due to worsening creatinine    F/u CXR  , PT/OT,home health and home O2 eval prior to d/c.     Code status discussed -  LIMITED        DVT prophylaxis: [] Lovenox                                 [] SCDs                                 [x] SQ Heparin                                 [] Encourage ambulation, low risk for DVT, no chemical or mechanical prophylaxis necessary              [] Already on Anticoagulation                Anticipated Disposition upon discharge: [x] Home                                                                         [] Home with Home Health                                                                         [] [facility]                                                                         [] [address]          Electronically signed by Kelsie Ortega MD on 4/21/2019 at 9:19 AM

## 2019-04-21 NOTE — PLAN OF CARE
Problem: Impaired respiratory status  Goal: Clear lung sounds  4/21/2019 0853 by Jade Hsieh RCP  Outcome: Ongoing   Improve breath sounds, increase aeration and decrease WOB.

## 2019-04-21 NOTE — PLAN OF CARE
Problem: Falls - Risk of:  Goal: Will remain free from falls  Description  Will remain free from falls  Outcome: Ongoing  Note:   No falls this shift. Falling star program and alarms in use. Patient uses call light appropriately. Problem: Falls - Risk of:  Goal: Absence of physical injury  Description  Absence of physical injury  Outcome: Ongoing  Note:   Patient free from falls. No injuries noted. Problem: Impaired respiratory status  Goal: Clear lung sounds  4/21/2019 1210 by Sylvain Roque RN  Outcome: Ongoing  Note:   Lung sounds clear but diminished. Problem: HEMODYNAMIC STATUS  Goal: Patient has stable vital signs and fluid balance  Outcome: Ongoing  Note:   Vital signs stable. Telemetry monitoring continues. Patient denies cheat pain when asked. Problem: FLUID AND ELECTROLYTE IMBALANCE  Goal: Fluid and electrolyte balance are achieved/maintained  Outcome: Ongoing  Note:   Electrolyte levels not checked this AM. Patient voiding good amounts. BUN/creat continue to be elevated. Problem: ACTIVITY INTOLERANCE/IMPAIRED MOBILITY  Goal: Mobility/activity is maintained at optimum level for patient  Outcome: Ongoing  Note:   Patient ambulating with assistance. Encouraging patient to walk in halls with staff. Problem: DISCHARGE BARRIERS  Goal: Patient's continuum of care needs are met  Outcome: Ongoing  Note:   Patient planning to be discharged home with home health. Patient denies need for further discharge planning. Problem: Nutrition  Goal: Optimal nutrition therapy  Outcome: Ongoing  Note:   Patient eats most meals. Care plan reviewed with patient. Patient verbalizes understanding of the plan of care and contributes to goal setting.

## 2019-04-22 PROBLEM — J96.21 ACUTE AND CHRONIC RESPIRATORY FAILURE WITH HYPOXIA (HCC): Status: ACTIVE | Noted: 2019-01-01

## 2019-04-22 PROBLEM — N18.30 CKD (CHRONIC KIDNEY DISEASE) STAGE 3, GFR 30-59 ML/MIN (HCC): Status: ACTIVE | Noted: 2019-01-01

## 2019-04-22 NOTE — PROGRESS NOTES
Patient received pamphlet about cardiac intervention, mended hearts program, cardiac rehab and the hours of operations, and Nutritional information regarding cardiac diet.

## 2019-04-22 NOTE — CARE COORDINATION
4/22/19, 10:30 AM      Nicolas Dean day: 7  Location: [id number]/027-A Reason for admit: Acute congestive heart failure with left ventricular diastolic dysfunction ([facility] [address] [I50.31]   Treatment Plan of Care: Internal med following. Cardiology consult: (+) troponin-not a candidate for invasive procedure. IV lasix to PO bumex-dose decreased r/t worsening creatinine. Cr 1.6 today (1.3 on 4/19). Repeat BMP tomorrow. PT/OT. 93% on 1L, home O2 eval at discharge. PCP: Emilio Walls MD  Readmission Risk Score: 20%  Discharge Plan: Plans to return home with [facility].

## 2019-04-22 NOTE — CARE COORDINATION
4/22/19, 1:48 PM    Discharge plan discussed by  and . Discharge plan reviewed with patient/ family. Patient/ family verbalize understanding of discharge plan and are in agreement with plan. Understanding was demonstrated using the teach back method. Received call from nurse re: discharge later today, has increased O2 needs. Patient plans home with [facility], called report. HH order/F2F and new O2 requirements faxed to agency. Patient reports her home O2 is from [place] [facility], only uses at night. [place] with Laura Miguel [place] [facility] [place] and deliver portable tank to use at discharge. Nurse has contacted physician for order and statement of medical necessity.         IMM Letter  IMM Letter given to Patient/Family/Significant other/Guardian/POA/by[de-identified] DIANNA  IMM Letter date given[de-identified] 04/22/19  IMM Letter time given[de-identified] 3312

## 2019-04-22 NOTE — PROGRESS NOTES
Admit Date: 4/15/2019  Hospital day 2    Subjective:     Patient ESTUARDOE CHEST PAIN . Medication side effects: none    Scheduled Meds:  Continuous Infusions:  PRN Meds:    Review of Systems  Pertinent items are noted in HPI. Objective:     No data found. I/O last 3 completed shifts: In: 56 [P.O.:620; I.V.:10]  Out: -     NO CHANGE     ECG: normal sinus rhythm, no blocks or conduction defects, no ischemic changes. Data Review:   CBC:  Lab Results   Component Value Date    WBC 5.3 04/16/2019    RBC 4.73 04/16/2019    HGB 13.8 04/16/2019    HCT 41.7 04/16/2019    MCV 88.2 04/16/2019    MCH 29.2 04/16/2019    MCHC 33.1 04/16/2019    RDW 13.8 11/18/2017     04/16/2019    MPV 9.9 04/16/2019     BMP  Lab Results   Component Value Date     04/22/2019    K 4.2 04/22/2019    CL 93 04/22/2019    CO2 35 04/22/2019    BUN 39 04/22/2019    CREATININE 1.6 04/22/2019    CALCIUM 9.2 04/22/2019      COAG PROFILE:  Lab Results   Component Value Date    APTT 77.1 11/14/2013       Assessment:     Active Problems:    CHF (congestive heart failure) ([facility])    Pneumonia    Acute congestive heart failure with left ventricular diastolic dysfunction ([facility])    Large hiatal hernia    Chronic cough    Abnormal chest x-ray    Sleep apnea    NSTEMI (non-ST elevated myocardial infarction) ([facility])    CKD (chronic kidney disease) stage 3, GFR 30-59 ml/min ([facility])    Acute and chronic respiratory failure with hypoxia ([facility])  Resolved Problems:    * No resolved hospital problems.  *      Plan:   PATIENT TO FEEL BETTER    WILL CONTINUE CONSERVATIVE RX    NO PLANS FOR INVASIVE PROCEDURES    CONTINUE IV ANTIBIOTICS       INCREASE ACTIVITY

## 2019-04-22 NOTE — PLAN OF CARE
Problem: Falls - Risk of:  Goal: Will remain free from falls  Description  Will remain free from falls  4/22/2019 0124 by Efrain Ferrer RN  Outcome: Ongoing  Note:   Patient absent of falls this shift, fall band intact, bed alarm set, falling star magnet in place. 4/21/2019 1210 by Nelly Vasquez RN  Outcome: Ongoing  Note:   No falls this shift. Falling star program and alarms in use. Patient uses call light appropriately. Goal: Absence of physical injury  Description  Absence of physical injury  4/22/2019 0124 by Efrain Ferrer RN  Outcome: Ongoing  4/21/2019 1210 by Nelly Vasquez RN  Outcome: Ongoing  Note:   Patient free from falls. No injuries noted. Problem: Impaired respiratory status  Goal: Clear lung sounds  4/22/2019 0124 by Efrain Ferrer RN  Outcome: Ongoing  Note:   Patient has adequate oxygenation this shift. Patient has 2 liters this shift. PRN breathing treatments given as needed. 4/21/2019 1210 by Nelly Vasquez RN  Outcome: Ongoing  Note:   Lung sounds clear but diminished. Problem: HEMODYNAMIC STATUS  Goal: Patient has stable vital signs and fluid balance  4/22/2019 0124 by Efrain Ferrer RN  Outcome: Ongoing  Note:   Patient is hemodynamically stable, blood pressure is within normal parameters, heart sounds within normal limits. 4/21/2019 1210 by Nelly Vasquez RN  Outcome: Ongoing  Note:   Vital signs stable. Telemetry monitoring continues. Patient denies cheat pain when asked. Problem: FLUID AND ELECTROLYTE IMBALANCE  Goal: Fluid and electrolyte balance are achieved/maintained  4/22/2019 0124 by Efrain Ferrer RN  Outcome: Ongoing  Note:   Patient electrolytes within normal parameters. Electrolytes being replaced per protocol. 4/21/2019 1210 by Nelly Vasquez RN  Outcome: Ongoing  Note:   Electrolyte levels not checked this AM. Patient voiding good amounts. BUN/creat continue to be elevated.      Problem: ACTIVITY INTOLERANCE/IMPAIRED MOBILITY  Goal: Mobility/activity is maintained at optimum level for patient  4/22/2019 0124 by Deanna Tang RN  Outcome: Ongoing  Note:   Patient up with x1 assistance to the bathroom, tolerating well. Patient uses walker as an assistance. 4/21/2019 1210 by Lima Vega RN  Outcome: Ongoing  Note:   Patient ambulating with assistance. Encouraging patient to walk in halls with staff. Problem: DISCHARGE BARRIERS  Goal: Patient's continuum of care needs are met  4/22/2019 0124 by Deanna Tang RN  Outcome: Ongoing  Note:   Patient actively participates in their care. 4/21/2019 1210 by Lima Vega RN  Outcome: Ongoing  Note:   Patient planning to be discharged home with home health. Patient denies need for further discharge planning. Problem: Nutrition  Goal: Optimal nutrition therapy  4/22/2019 0124 by Deanna Tang RN  Outcome: Ongoing  Note:   Patient tolerating diet. No nausea noted, accurate I&Os noted. 4/21/2019 1210 by Lima Vega RN  Outcome: Ongoing  Note:   Patient eats most meals. Care plan reviewed with patient. Patient verbalize understanding of the plan of care and contribute to goal setting.

## 2019-04-22 NOTE — DISCHARGE INSTR - DIET

## 2019-04-22 NOTE — PROGRESS NOTES
INTERNAL MEDICINE SPECIALTIES  Progress Note For Dr Falguni Alvarez       Patient:  Ryanne Live  YOB: 1926  Date of Service: 4/22/2019  MRN: [id number]   Acct:  [de-identified]   Primary Care Physician: Rossi Solano MD    SUBJECTIVE: Noticed a small blood stain in groin, ambulating , no symptoms    Home Medications:   No current facility-administered medications on file prior to encounter. Current Outpatient Medications on File Prior to Encounter   Medication Sig Dispense Refill    metoprolol tartrate (LOPRESSOR) 25 MG tablet Take 25 mg by mouth 2 times daily      aspirin 81 MG chewable tablet Take 1 tablet by mouth daily 30 tablet 3    isosorbide mononitrate (IMDUR) 30 MG extended release tablet Take 1 tablet by mouth daily 30 tablet 3    pantoprazole (PROTONIX) 40 MG tablet Take 1 tablet by mouth every morning (before breakfast) 30 tablet 3    Potassium 99 MG TABS Take 1 tablet by mouth daily       Magnesium 500 MG TABS Take 1 tablet by mouth daily       Cholecalciferol (VITAMIN D3) 1000 units CAPS Take 1 tablet by mouth daily       bumetanide (BUMEX) 2 MG tablet Take 1 tablet by mouth daily 30 tablet 3    acetaminophen 650 MG TABS Take 650 mg by mouth every 4 hours as needed. 120 tablet 3    albuterol (PROAIR HFA) 108 (90 BASE) MCG/ACT inhaler Inhale 2 puffs into the lungs every 6 hours as needed for Wheezing. 1 Inhaler 3    amLODIPine (NORVASC) 10 MG tablet Take 0.5 tablets by mouth nightly. (Patient taking differently: Take 5 mg by mouth nightly Pt takes in the morning.) 30 tablet 3    ALPRAZolam (XANAX) 0.25 MG tablet Take 0.25 mg by mouth 3 times daily as needed for Anxiety.  nitroGLYCERIN (NITROSTAT) 0.4 MG SL tablet Place 1 tablet under the tongue every 5 minutes as needed for Chest pain. 25 tablet 0    levothyroxine (SYNTHROID) 100 MCG tablet Take 1 tablet by mouth Daily.  30 tablet 0         Scheduled Meds:   neomycin-bacitracin-polymyxin   Topical BID    bumetanide 1 mg Oral Daily    clopidogrel  75 mg Oral Daily    atorvastatin  10 mg Oral Nightly    isosorbide mononitrate  30 mg Oral 2 times per day    sucralfate  1 g Oral 4x Daily AC & HS    pantoprazole  40 mg Oral BID AC    potassium chloride  10 mEq Oral BID    fluticasone  2 spray Each Nare Daily    amLODIPine  5 mg Oral Daily    aspirin  81 mg Oral Daily    vitamin D  1 tablet Oral Daily    Magnesium Oxide  500 mg Oral Daily    metoprolol tartrate  25 mg Oral BID    sodium chloride flush  10 mL Intravenous 2 times per day    albuterol  2.5 mg Nebulization Q4H WA    doxycycline hyclate  100 mg Oral 2 times per day    levothyroxine  88 mcg Oral Daily     Continuous Infusions:  PRN Meds:benzonatate, guaiFENesin-dextromethorphan, acetaminophen, ALPRAZolam, nitroGLYCERIN, sodium chloride flush, magnesium hydroxide, ondansetron        Allergies: Avelox [moxifloxacin]; Pcn [penicillins]; Prednisone; Ranexa [ranolazine]; Rocephin [ceftriaxone sodium-lidocaine]; Sulfur; Zithromax [azithromycin]; and Codeine    OBJECTIVE:    Vitals:   Vitals:    04/22/19 0757   BP:    Pulse:    Resp:    Temp:    SpO2: 93%      BMI: Body mass index is 37.06 kg/m². PHYSICAL EXAMINATION:            General appearance: Appears stated age and cooperative. HEENT:  Normal cephalic, atraumatic without obvious deformity. Pupils equal, round, and reactive to light. Neck: Supple   Respiratory: diminished air entry bases  Cardiovascular:  Regular rhythm with normal S1/S2 without rubs or gallops. Abdomen: Soft, non-tender, non-distended with normal bowel sounds. abbraision right vulva, No active bleeding. Musculoskeletal:  No clubbing, cyanosis  trace edema bilaterally. Skin: Skin color, texture, turgor normal.  No rashes or lesions.   Neurologic: Alert and oriented, Neurovascularly intact without any focal motor deficits.           Review of Labs and Diagnostic Testing:    Recent Results (from the past 24 hour(s))   Troponin Collection Time: 04/22/19  6:12 AM   Result Value Ref Range    Troponin T 0.106 (A) ng/ml   Basic Metabolic Panel    Collection Time: 04/22/19  6:12 AM   Result Value Ref Range    Sodium 137 135 - 145 meq/L    Potassium 4.2 3.5 - 5.2 meq/L    Chloride 93 (L) 98 - 111 meq/L    CO2 35 (H) 23 - 33 meq/L    Glucose 103 70 - 108 mg/dL    BUN 39 (H) 7 - 22 mg/dL    CREATININE 1.6 (H) 0.4 - 1.2 mg/dL    Calcium 9.2 8.5 - 10.5 mg/dL   Anion Gap    Collection Time: 04/22/19  6:12 AM   Result Value Ref Range    Anion Gap 9.0 8.0 - 16.0 meq/L   Glomerular Filtration Rate, Estimated    Collection Time: 04/22/19  6:12 AM   Result Value Ref Range    Est, Glom Filt Rate 30 (A) ml/min/1.73m2       Radiology:     Xr Chest Portable    Result Date: 4/16/2019  PROCEDURE: XR CHEST PORTABLE CLINICAL INFORMATION: f/u CHF. COMPARISON: Chest x-ray dated 4/15/2019 TECHNIQUE: AP Portable upright chest xray FINDINGS: Lungs/pleura: There is mildly worsened left lower lobe atelectasis or pneumonia. Right lung is clear. No pleural effusion. No pneumothorax. Heart: There is stable mild cardiomegaly. Mediastinum/irma: No change in the large hiatal hernia. The irma are unremarkable. Skeleton: No acute bone or joint abnormality. Bones are osteopenic. Lines/tubes/devices: none     Mildly worsened left lower lobe pneumonia versus atelectasis. **This report has been created using voice recognition software. It may contain minor errors which are inherent in voice recognition technology. ** Final report electronically signed by Dr. Juanita Bales on 4/16/2019 5:19 AM    Xr Chest Portable    Result Date: 4/15/2019  PROCEDURE: XR CHEST PORTABLE CLINICAL INFORMATION: cough, short of breath, hx CHF COMPARISON: 11/17/2017 TECHNIQUE: A single mobile view of the chest was obtained. 1. Moderate cardiomegaly. Moderate-sized hiatus hernia. 2. Mild retrocardiac atelectasis/pneumonia with a small associated effusion.  Questionable minimal infiltrate right infrahilar region. No pulmonary vascular congestion seen. **This report has been created using voice recognition software. It may contain minor errors which are inherent in voice recognition technology. ** Final report electronically signed by Dr. Sunitha Jeter on 4/15/2019 11:18 AM    ECHO: 1/10/19  Summary   Normal left ventricle size and systolic function. Ejection fraction was   estimated at -50   55-%. There were no regional left ventricular wall motion abnormalities   and wall thickness was within normal limits.   The aortic valve was trileaflet with increase thickness and cuspal   separation. DOPPLER: Transaortic velocity was within the normal range with   no evidence of aortic stenosis. There was mild aortic regurgitation.   The mitral valve structure was calcified with normal leaflet separation.   DOPPLER: The transmitral velocity was within the normal range with no   evidence for mitral stenosis. There was mild mitral regurgitation.      CHEST CT     Impression       1. Opacities at the left lung base thought to be on the basis of a pneumonic consolidation. 2. Dilatation of the main pulmonary artery which can be seen with pulmonary arterial hypertension. 3. Aneurysmal dilatation of the ascending aorta measuring 4.4 cm. 4. Cardiomegaly. 5. Large hiatal hernia.         CT SINUSES:     Impression       No evidence to suggest acute sinusitis. Additional findings are as further described in the body of the report. Legionella Urinary Ag 04/15/2019 10:46 PM [facility]   Negative      Results for Jennifer Wheat (MRN [id number]) as of 4/19/2019 12:17   Ref.  Range 4/15/2019 22:46   Strep pneumo Ag, Ur Latest Ref Range: Negative  Negative         ASSESMENT:      Active Problems:    CHF (congestive heart failure) (H[facility])    Pneumonia    Acute congestive heart failure with left ventricular diastolic dysfunction ([facility])    Large hiatal hernia    Chronic cough    Abnormal chest x-ray    Sleep apnea    NSTEMI (non-ST elevated myocardial infarction) ([facility])    CKD (chronic kidney disease) stage 3, GFR 30-59 ml/min ([facility])    Acute and chronic respiratory failure with hypoxia ([facility])  Resolved Problems:    * No resolved hospital problems. *      PLAN:   f/u BMP/  troponin; optimize anti ischemic regimen for type 1 NSTEMI. Seen by cardiology ( Dr Oriana Ospina) The patient  Is to be treated medically. She is not a candidate for any invasive procedure at this point . Consider cardiac rehab      No aspiration per Speech therapist evaluation . Continue on bronchodilators, O2, broad spectrum antibiotics to cover community acquired organisms, steroids,  legionella and strep pneumonia urinary antigens were negative, f/u blood cultures, sputum gram stain c/s,  rapid influenza screen was negative,  Maintain onIncentive spirometry , acapella   guiafenesin,  Appreciate pulmonary consult. Added tessalon perles   CXR improving. Etiology of pneumonia unknown     Transitioned iv  lasix to oral bumex ,last ECHO from 3 months ago as noted above, ,   monitor weight, O2 per protocol ;Cardiology consult  Pending. Reduced bumex due to worsening creatinine. Avoid ACE/ARB for now. PT/OT,home health and home O2 eval prior to d/c. Code status discussed -  LIMITED    D/c planning.      DVT prophylaxis: [] Lovenox                                 [] SCDs                                 [x] SQ Heparin                                 [] Encourage ambulation, low risk for DVT, no chemical or mechanical prophylaxis necessary              [] Already on Anticoagulation                Anticipated Disposition upon discharge: [x] Home                                                                         [] Home with Home Health                                                                         [] [facility]                                                                         [] [address]          Electronically signed by Lise Chapman MD on 4/22/2019 at 10:35 AM

## 2019-04-22 NOTE — PROGRESS NOTES
A home oxygen evaluation has been completed. [x]Patient is an inpatient. It is expected that the patient will be discharged within the next 48 hours. Qualified provider to write order for home prescription if patient qualifies. Social service/care managers will arrange for home oxygen. If patient is active, arrange for Home Medical supplier to assess for Oxygen Conserving Device per pulse oximetry. []Patient is an outpatient. Results will be faxed to the ordering provider. Qualified provider to write order for home prescription if patient qualifies and arranges for home oxygen. Patient was placed on room air for 6  minutes. SpO2 was 88% on room air at rest. Patients SpO2 was below 89% and qualified for home oxygen. Oxygen was applied at 1 lpm via nasal cannula to maintain a SpO2 between 90-92% while at rest. Actual SpO2 was 92 %. Patient can ambulate for exercise flow rate. Patients was ambulated, SpO2 was 90% on 3 lpm to maintain SpO2 between 90-92% while exercising. Note: For any SpO2 at 73% see policy and procedure for possible qualifications.

## 2019-04-22 NOTE — PROGRESS NOTES
Nutrition Assessment    Type and Reason for Visit: Reassess, Consult(NSTEMI/CAD)    Nutrition Recommendations: Continue diet as tolerated. Encouraged family to bring in Boost ONS from home. Nutrition Assessment:   Pt. with no improvement from a nutritional standpoint AEB continued poor appetite/intake. States she prefers Boost ONS compared to Ensure so family is bringing in from home. Remains at risk for further nutritional compromise r/t underlying medical condition (CHF, CAD; hx CKD), advanced age. Will continue to send Ensure as option if no Boost available from home. Encouraged po intake at best efforts. Pt. Denies any questions on diet (reviewed basics of diet 4/16 however pt/family state they prefer to let her eat what she wants d/t advanced age). Malnutrition Assessment:  · Malnutrition Status: At risk for malnutrition    Nutrition Risk Level: Moderate    Nutrient Needs:  · Estimated Daily Total Kcal: 8652-8881 kcals (15-18 kcals/kgm wt of 85 kgm)  · Estimated Daily Protein (g): 31-35 gm (0.7-0.8 gm/kgm IBW of 44 kgm)     Nutrition Diagnosis:   · Problem: Inadequate oral intake  · Etiology: related to Insufficient energy/nutrient consumption     Signs and symptoms:  as evidenced by Diet history of poor intake    Objective Information:  · Nutrition-Focused Physical Findings: 2 BMs noted past 24 hours;  Rx includes Bumex, Carafate, Vitamin D; BUN 39, Cr 1.6  · Wound Type: None(noted)  · Current Nutrition Therapies:  · Oral Diet Orders: Cardiac   · Oral Diet intake: 1-25%, 26-50%, 51-75%, %  · Oral Nutrition Supplement (ONS) Orders: Standard High Calorie Oral Supplement(once/day)  · ONS intake: (reports prefers Boost; family brought in some Boost for pt)  · Anthropometric Measures:  · Ht: 4' 11\" (149.9 cm)   · Current Body Wt: 183 lb 8 oz (83.2 kg)(4/21, trace edema)  · Admission Body Wt: 187 lb 11.2 oz (85.1 kg)(4/16, +1 edema)  · Usual Body Wt: (per pt 194-196# with fluid; per EMR: 1/9/19: 186# 8 oz, 1/2015: 194# 3.2 oz)  · % Weight Change:  ,  varies with fluid  · Ideal Body Wt: 98 lb (44.5 kg),   · BMI Classification: BMI 35.0 - 39.9 Obese Class II    Nutrition Interventions:   Continue current diet, Continue current ONS  Continued Inpatient Monitoring, Education Completed, Coordination of Care(4/16 Reviewed basics of low sodium diet to prevent edema, SOB, and encouraged comfort. Will send Ensure Enlive as substitute for Boost that pt drinks pta. Written information, RD name and number given.)    Nutrition Evaluation:   · Evaluation: Progressing toward goals   · Goals: Pt. will consume 75% or more of meals during LOS.     · Monitoring: Meal Intake, Supplement Intake, Diet Tolerance, Skin Integrity, Weight, Pertinent Labs, Patient/Family Education, Monitor Bowel Function      Electronically signed by Norma Mcclellan RD, LD on 4/22/19 at 11:05 AM    Contact Number: [phone]

## 2019-04-22 NOTE — CARE COORDINATION
[address] Transitions Interview     2019    Patient: Aleks Puga Patient : 1926   MRN: [id number]  Reason for Admission:   RARS: Readmission Risk Score: 20       Introduced self/role. Explained BPCI-A program and beneficiary letter. Patient verbalized understandment. Readmission Risk  Patient Active Problem List   Diagnosis    Obesity    CKD (chronic kidney disease) stage 3, GFR 30-59 ml/min ([facility])    Essential hypertension    Diastolic CHF, chronic (HCC)    Acute respiratory failure with hypoxia ([facility])    Hypothyroidism    Bradycardia    Gastroenteritis    Gastroenteritis, acute    Hypokalemia    Chest pain    CHF (congestive heart failure) ([facility])    Pneumonia    Acute congestive heart failure with left ventricular diastolic dysfunction ([facility])    Large hiatal hernia    Chronic cough    Abnormal chest x-ray    Sleep apnea    NSTEMI (non-ST elevated myocardial infarction) ([facility]    CKD (chronic kidney disease) stage 3, GFR 30-59 ml/min (HCC)    Acute and chronic respiratory failure with hypoxia [facility])       Inpatient Assessment  Care Transitions Summary    Care Transitions Inpatient Review  Medication Review  Are you able to afford your medications?:  Yes  How often do you have difficulty taking your medications?:  I always take them as prescribed. Housing Review  Who do you live with?:  Alone  Are you an active caregiver in your home?:  No  Social Support  Do you have a ?:  No  Do you have a [address] [place] [address]?:  No  Durable Medical Equipment  Patient DME:  Straight cane, Walker  Patient Home Equipment:  Oxygen  Functional Review  Ability to seek help/take action for Emergent/Urgent situations i.e. fire, crime, inclement weather or health crisis. :  Independent  Ability handle personal hygiene needs (bathing/dressing/grooming): Independent  Ability to manage medications:   Independent  Ability to prepare food:  Independent  Ability to maintain

## 2019-04-22 NOTE — FLOWSHEET NOTE
04/22/19 1518   Encounter Summary   Services provided to: Patient and family together   Referral/Consult From: [facility] Family members   Continue Visiting Yes  (4/22/19)   Complexity of Encounter Low   Length of Encounter 15 minutes   Spiritual/Scientology   Type Spiritual support   Assessment Calm; Approachable   Intervention Nurtured hope   Outcome Comfort   S- During my contact with the patient and family I wanted to assess the spiritual and          emotional needs. O- The patient was in bed and their family was also supportively present. The pt didnt          share any major concerns at the time. The pt does have support through their family. The pt still has a cough that frustrates the pt. A- I offered emotional support as well as words of encouragement to all that was           present. P-  Continued support would be helpful in order to meet the future Spiritual needs of the         patient.

## 2019-04-22 NOTE — PROGRESS NOTES
Inpatient Cardiac Rehabilitation Consult    Received consult for Phase II Cardiac Rehabilitation. We will continue to follow patient's hospital course.

## 2019-04-22 NOTE — PROGRESS NOTES
Patient was evaluated for the need of oxygen and was noted to have become hypoxic with activity in the absence of oxygen supplement.   In view of her underlying congestive heart failure, pneumonia and acute respiratory failure she will require supplemental oxygen with activity and at rest.

## 2019-04-22 NOTE — DISCHARGE SUMMARY
was progressive improvement however she had developed chest pains and serial troponins had become elevated. It was felt that she had developed a non ST elevation MI and she was treated conservatively. She was seen by the cardiologist who had indicated that she has to be treated medically as she is not a candidate for any invasive procedure at this point. Anti skin nick regimen was maintained however Ace inhibitors/arbs were not added to the regimen on account of kidney disease. Cardiac rehab will be established for her. he was seen by the speech therapist and there is no evidence of aspiration  a chest x-ray had also shown improvement. IV Lasix was transitioned to oral Bumex   The patient was evaluated for home O2, her  SpO2 was 88% on room air at rest. Patients SpO2 was below 89% and qualified for home oxygen. Oxygen was applied at 1 lpm via nasal cannula to maintain a SpO2 between 90-92% while at rest. Actual SpO2 was 92 %. Patient can ambulate for exercise flow rate. Patients was ambulated, SpO2 was 90% on 3 lpm to maintain SpO2 between 90-92% while exercising.    at this point the patient is stable enough to be discharged. Appropriate follow-up to be as stated below         Labs: For convenience and continuity at follow-up the following most recent labs are provided:      CBC:    Lab Results   Component Value Date    WBC 5.3 04/16/2019    HGB 13.8 04/16/2019    HCT 41.7 04/16/2019     04/16/2019       Renal:    Lab Results   Component Value Date     04/22/2019    K 4.2 04/22/2019    CL 93 04/22/2019    CO2 35 04/22/2019    BUN 39 04/22/2019    CREATININE 1.6 04/22/2019    CALCIUM 9.2 04/22/2019    PHOS 3.2 01/05/2015         Significant Diagnostic Studies    Radiology:   Ct Chest Wo Contrast    Result Date: 4/17/2019  PROCEDURE: CT CHEST WO CONTRAST CLINICAL INFORMATION: 80year-old female with chronic cough. Hiatal hernia.  COMPARISON: There is no similar dedicated study available for comparison. TECHNIQUE: 5 mm noncontrast axial images were obtained through the chest. Sagittal and coronal reconstructions were obtained. All CT scans at this facility use dose modulation, iterative reconstruction, and/or weight-based dosing when appropriate to reduce radiation dose to as low as reasonably achievable. FINDINGS: The central airway is patent. There is a large hiatal hernia. The remaining visualized upper abdominal structures are within normal limits. There is mild cardiomegaly. There is no pericardial effusion. There are coronary artery calcifications. There is aneurysmal dilatation of the ascending aorta measuring up to 4.4 cm. There are atherosclerotic calcifications throughout the aorta. There is mild dilatation of the main pulmonary artery measuring 3.4 cm, a finding which can be seen with pulmonary arterial hypertension. There are no pathologically enlarged mediastinal, axillary or hilar lymph nodes. There are opacities at the left lung base which are thought to represent an 8 pneumonic consolidation. There is no evidence for honeycombing. There is no significant bronchiectasis. There is generalized osteopenia. There are no acute fractures. 1. Opacities at the left lung base thought to be on the basis of a pneumonic consolidation. 2. Dilatation of the main pulmonary artery which can be seen with pulmonary arterial hypertension. 3. Aneurysmal dilatation of the ascending aorta measuring 4.4 cm. 4. Cardiomegaly. 5. Large hiatal hernia. **This report has been created using voice recognition software. It may contain minor errors which are inherent in voice recognition technology. ** Final report electronically signed by Dr Nicolas Biswas on 4/17/2019 9:16 AM    Xr Chest Portable    Result Date: 4/19/2019  PROCEDURE: XR CHEST PORTABLE CLINICAL INFORMATION: chest pain COMPARISON: 4/16/2019 TECHNIQUE: A single mobile view of the chest was obtained. 1. Heart is obscured. Large hiatus hernia.  2. Small pleural effusion left side. Mild left basilar atelectasis/pneumonia. 3. Overall appearance of chest slightly improved from prior. **This report has been created using voice recognition software. It may contain minor errors which are inherent in voice recognition technology. ** Final report electronically signed by Dr. Day Beckett on 4/19/2019 12:47 PM    Xr Chest Portable    Result Date: 4/16/2019  PROCEDURE: XR CHEST PORTABLE CLINICAL INFORMATION: f/u CHF. COMPARISON: Chest x-ray dated 4/15/2019 TECHNIQUE: AP Portable upright chest xray FINDINGS: Lungs/pleura: There is mildly worsened left lower lobe atelectasis or pneumonia. Right lung is clear. No pleural effusion. No pneumothorax. Heart: There is stable mild cardiomegaly. Mediastinum/irma: No change in the large hiatal hernia. The irma are unremarkable. Skeleton: No acute bone or joint abnormality. Bones are osteopenic. Lines/tubes/devices: none     Mildly worsened left lower lobe pneumonia versus atelectasis. **This report has been created using voice recognition software. It may contain minor errors which are inherent in voice recognition technology. ** Final report electronically signed by Dr. Omari Damon on 4/16/2019 5:19 AM    Xr Chest Portable    Result Date: 4/15/2019  PROCEDURE: XR CHEST PORTABLE CLINICAL INFORMATION: cough, short of breath, hx CHF COMPARISON: 11/17/2017 TECHNIQUE: A single mobile view of the chest was obtained. 1. Moderate cardiomegaly. Moderate-sized hiatus hernia. 2. Mild retrocardiac atelectasis/pneumonia with a small associated effusion. Questionable minimal infiltrate right infrahilar region. No pulmonary vascular congestion seen. **This report has been created using voice recognition software. It may contain minor errors which are inherent in voice recognition technology. ** Final report electronically signed by Dr. Day Beckett on 4/15/2019 11:18 AM    Ct Sinus Wo Contrast    Result Date: 4/17/2019  PROCEDURE: CT Bilateral lower extremity duplex venous ultrasound dated 10/31/2013 TECHNIQUE: Right lower extremity duplex venous ultrasound was performed using gray scale, color flow and spectral doppler imaging. FINDINGS: Deep veins (common femoral, femoral, popliteal, and calf veins): Patent and compressible, with spontaneous flow, phasicity, and satisfactory augmentation. The left common femoral vein is patent and compressible with flow noted by color flow and spectral Doppler ultrasound. Superficial veins (saphenous veins): The imaged portion of the greater saphenous vein at the saphenofemoral junction is patent by color-flow ultrasound. Soft tissues: There is no Baker's cyst. No soft tissue mass or focal fluid collection. No evidence of acute right lower extremity DVT. **This report has been created using voice recognition software. It may contain minor errors which are inherent in voice recognition technology. ** Final report electronically signed by Dr. Pavel Gibbons on 4/17/2019 11:42 PM     ECHO: 1/10/19  Summary   Normal left ventricle size and systolic function. Ejection fraction was   estimated at -50   55-%. There were no regional left ventricular wall motion abnormalities   and wall thickness was within normal limits.   The aortic valve was trileaflet with increase thickness and cuspal   separation. DOPPLER: Transaortic velocity was within the normal range with   no evidence of aortic stenosis. There was mild aortic regurgitation.   The mitral valve structure was calcified with normal leaflet separation.   DOPPLER: The transmitral velocity was within the normal range with no   evidence for mitral stenosis.  There was mild mitral regurgitation.              Consults:     IP CONSULT TO DIETITIAN  IP CONSULT TO SOCIAL WORK  IP CONSULT TO PULMONOLOGY  IP CONSULT TO GI  IP CONSULT TO HOME CARE NEEDS  IP CONSULT TO CARDIOLOGY  IP CONSULT TO HOME CARE NEEDS  IP CONSULT TO CARDIAC REHAB  IP CONSULT TO DIETITIAN    Disposition:    [x] Home       [] TCU       [] Rehab       [] Psych       [] SNF       [] [place]       [] Other-    Condition at Discharge: stable    Code Status:  Limited    Patient Instructions: Activity:  Avoid strenuous activity  Diet: DIET CARDIAC; Dietary Nutrition Supplements: Standard High Calorie Oral Supplement      Follow-up visits:   Valeria Mark MD  [address]  [address] [address]  [phone]    On 2019  2:00 PM    Angélica Hooper  [address] [facility]  [phone]    In 3 weeks   If symptoms worsen    Kelsie Cano, APRN - CNP  [address] [facility]  [address] [facility]  [address]    On 2019  appointment at 10:30. Please get chest xray before visit. Cora Mora, [address]  [address] [facility] [address]  [phone]    On 2019  office will call with appointment date and time         Discharge Medications:      Bayron Patience   Home Medication Instructions [place]    Printed on:19 6182   Medication Information                      acetaminophen 650 MG TABS  Take 650 mg by mouth every 4 hours as needed. albuterol (PROAIR HFA) 108 (90 BASE) MCG/ACT inhaler  Inhale 2 puffs into the lungs every 6 hours as needed for Wheezing. ALPRAZolam (XANAX) 0.25 MG tablet  Take 0.25 mg by mouth 3 times daily as needed for Anxiety.              amLODIPine (NORVASC) 5 MG tablet  Take 1 tablet by mouth daily             aspirin 81 MG chewable tablet  Take 1 tablet by mouth daily             atorvastatin (LIPITOR) 10 MG tablet  Take 1 tablet by mouth nightly             benzonatate (TESSALON) 100 MG capsule  Take 1 capsule by mouth 3 times daily as needed for Cough             bumetanide (BUMEX) 1 MG tablet  Take 1 tablet by mouth daily             Cholecalciferol (VITAMIN D3) 1000 units CAPS  Take 1 tablet by mouth daily              clopidogrel (PLAVIX) 75 MG tablet  Take 1 tablet by

## 2019-04-22 NOTE — PROGRESS NOTES
throughout       ECG: normal sinus rhythm, no blocks or conduction defects, no ischemic changes. Data Review:   CBC:  Lab Results   Component Value Date    WBC 5.3 04/16/2019    RBC 4.73 04/16/2019    HGB 13.8 04/16/2019    HCT 41.7 04/16/2019    MCV 88.2 04/16/2019    MCH 29.2 04/16/2019    MCHC 33.1 04/16/2019    RDW 13.8 11/18/2017     04/16/2019    MPV 9.9 04/16/2019     BMP  Lab Results   Component Value Date     04/22/2019    K 4.2 04/22/2019    CL 93 04/22/2019    CO2 35 04/22/2019    BUN 39 04/22/2019    CREATININE 1.6 04/22/2019    CALCIUM 9.2 04/22/2019      COAG PROFILE:  Lab Results   Component Value Date    APTT 77.1 11/14/2013       Assessment:     Active Problems:    CHF (congestive heart failure) ([facility])    Pneumonia    Acute congestive heart failure with left ventricular diastolic dysfunction ([facility])    Large hiatal hernia    Chronic cough    Abnormal chest x-ray    Sleep apnea    NSTEMI (non-ST elevated myocardial infarction) ([facility])    CKD (chronic kidney disease) stage 3, GFR 30-59 ml/min ([facility])    Acute and chronic respiratory failure with hypoxia ([facility])  Resolved Problems:    * No resolved hospital problems.  *      Plan:   PATIENT HAS NO CHEST PAIN    NO FEVER    OK FOR DISCHARGE    WILL SEE AT THE OFFICE

## 2019-04-23 NOTE — CARE COORDINATION
Pa [facility] Transitions Initial Follow Up Call    Call within 2 business days of discharge: Yes    Patient: Max Dunbar Patient : 1926   MRN: [id number]  Reason for Admission: CHF  Discharge Date: 19 RARS: Readmission Risk Score: 20      Last Discharge [address] [address]       Complaint Diagnosis Description Type Department Provider    4/15/19 Cough Dyspnea and respiratory abnormalities . .. ED to Hosp-Admission (Discharged) (ADMITTED) STR[facility] 8B Lacho Galloway MD           Spoke with: Max Dunbar  Patient gave phone to daughter Lady Lovell. Daughter stated she is amazed to how well her mother is doing. Denies chest pain, SOB coughing, and edema. She stated she picked medication up yesterday. Home health nurse is reviewing medication now. Daughter denied need to review medication. Family with transport to [facility]. Denies concerns or needs.     Facility: [facility]    Non-face-to-face services provided:  Obtained and reviewed discharge summary and/or continuity of care documents    Care Transitions 24 Hour Call    Do you have any ongoing symptoms?:  No  Do you have a copy of your discharge instructions?:  Yes  Do you have all of your prescriptions and are they filled?:  Yes  Have you been contacted by a [address]?:  No  Have you scheduled your follow up appointment?:  Yes  How are you going to get to your appointment?:  Car - family or friend to transport  Were you discharged with any Home Care or Post Acute Services:  No  Patient DME:  Straight cane, Walker  Patient Home Equipment:  Oxygen  Do you have support at home?:  Alone  Do you feel like you have everything you need to keep you well at home?:  Yes  Are you an active caregiver in your home?:  No  Care Transitions Interventions  No Identified Needs         Follow Up  Future Appointments   Date Time Provider Curt Sabillon   2019 10:30 AM Lizette Zaman, APRN - WICHO Kimball RN

## 2019-07-23 NOTE — PROGRESS NOTES
artery disease)     CHF (congestive heart failure) ([facility])     CKD (chronic kidney disease) stage 3, GFR 30-59 ml/min ([facility]) 1/2/2015    GERD (gastroesophageal reflux disease)     History of bleeding ulcers     Hypertension     Large hiatal hernia 4/17/2019    Pneumonia     Thyroid disease      SURGICAL HISTORY:  Past Surgical History:   Procedure Laterality Date    APPENDECTOMY      BREAST SURGERY Left 1959    cyst removal     CHOLECYSTECTOMY      COLONOSCOPY      ENDOSCOPY, COLON, DIAGNOSTIC      EYE SURGERY Bilateral 2006    cataract removal    FRACTURE SURGERY Right 1986    R arm fx    HYSTERECTOMY      TONSILLECTOMY       SOCIAL HISTORY:   Social History     Tobacco Use    Smoking status: Never Smoker    Smokeless tobacco: Never Used   Substance Use Topics    Alcohol use: No    Drug use: No     ALLERGIES:  Allergies   Allergen Reactions    Avelox [Moxifloxacin] Other (See Comments)     Lo BP    Pcn [Penicillins] Itching    Prednisone Other (See Comments)     BLEEDING ULCERS    Ranexa [Ranolazine]      Kidney failure    Rocephin [Ceftriaxone Sodium-Lidocaine] Other (See Comments)     LOW BP    Sulfur Hives    Zithromax [Azithromycin] Other (See Comments)     DROP IN BP    Codeine Nausea And Vomiting     FAMILY HISTORY:  Family History   Problem Relation Age of Onset    Heart Disease Mother     Early Death Mother     Heart Disease Father     Cancer Sister     Heart Disease Sister     Heart Disease Brother     Substance Abuse Brother     Substance Abuse Brother      CURRENT MEDICATIONS:  Current Outpatient Medications   Medication Sig Dispense Refill    isosorbide mononitrate (IMDUR) 30 MG extended release tablet Take 1 tablet by mouth every 12 hours 30 tablet 3    amLODIPine (NORVASC) 5 MG tablet Take 1 tablet by mouth daily 30 tablet 3    bumetanide (BUMEX) 1 MG tablet Take 1 tablet by mouth daily 30 tablet 3    clopidogrel (PLAVIX) 75 MG tablet Take 1 tablet by mouth daily 30 tablet 3    levothyroxine (SYNTHROID) 88 MCG tablet Take 1 tablet by mouth Daily 30 tablet 3    potassium chloride (KLOR-CON M) 10 MEQ extended release tablet Take 1 tablet by mouth daily 30 tablet 3    metoprolol tartrate (LOPRESSOR) 25 MG tablet Take 25 mg by mouth 2 times daily      aspirin 81 MG chewable tablet Take 1 tablet by mouth daily 30 tablet 3    pantoprazole (PROTONIX) 40 MG tablet Take 1 tablet by mouth every morning (before breakfast) 30 tablet 3    Magnesium 500 MG TABS Take 1 tablet by mouth daily       Cholecalciferol (VITAMIN D3) 1000 units CAPS Take 1 tablet by mouth daily       acetaminophen 650 MG TABS Take 650 mg by mouth every 4 hours as needed. 120 tablet 3    ALPRAZolam (XANAX) 0.25 MG tablet Take 0.25 mg by mouth 3 times daily as needed for Anxiety.  nitroGLYCERIN (NITROSTAT) 0.4 MG SL tablet Place 1 tablet under the tongue every 5 minutes as needed for Chest pain. 25 tablet 0    atorvastatin (LIPITOR) 10 MG tablet Take 1 tablet by mouth nightly 30 tablet 3    albuterol (PROAIR HFA) 108 (90 BASE) MCG/ACT inhaler Inhale 2 puffs into the lungs every 6 hours as needed for Wheezing. 1 Inhaler 3     No current facility-administered medications for this visit. Sabra JUNG   Review of Systems   Constitutional: Negative for chills, fever and unexpected weight change. Respiratory: Negative for cough, chest tightness, shortness of breath, wheezing and stridor. Cardiovascular: Negative for chest pain and leg swelling. Gastrointestinal: Negative for diarrhea, nausea and vomiting. Physical exam   /68   Pulse 57   Ht 4' 11\" (1.499 m)   Wt 189 lb (85.7 kg)   SpO2 94% Comment: on RA  BMI 38.17 kg/m²    Wt Readings from Last 3 Encounters:   07/23/19 189 lb (85.7 kg)   04/21/19 183 lb 8 oz (83.2 kg)   01/11/19 186 lb 8 oz (84.6 kg)       Physical Exam   Constitutional: She is oriented to person, place, and time. She appears well-developed and well-nourished.  No dilatation of the ascending aorta measuring up to 4.4 cm. There are atherosclerotic calcifications throughout the aorta. There is mild dilatation of the main pulmonary artery measuring 3.4 cm, a finding which can be seen with pulmonary arterial hypertension. There are no pathologically enlarged mediastinal, axillary or hilar lymph nodes. There are opacities at the left lung base which are thought to represent an 8 pneumonic consolidation. There is no evidence for honeycombing. There is no significant bronchiectasis. There is generalized osteopenia. There are no acute fractures. Impression:  1. Opacities at the left lung base thought to be on the basis of a pneumonic consolidation. 2. Dilatation of the main pulmonary artery which can be seen with pulmonary arterial hypertension. 3. Aneurysmal dilatation of the ascending aorta measuring 4.4 cm. 4. Cardiomegaly. 5. Large hiatal hernia. CT SINUS WO CONTRAST   4/17/2019   FINDINGS:   Frontal sinuses: Normally aerated and pneumatized. The frontal ethmoidal recesses are patent. Ethmoid air cells: Normally aerated and pneumatized. The lamina papyracea are intact. The cribriform plates and fovea ethmoidalis are relatively symmetric. Sphenoid sinus: Normally aerated and pneumatized. Mucosal coaptation causes obstruction of the right sphenoethmoidal recess and narrowing of the left sphenoethmoidal recess. Maxillary sinuses: Normally aerated and pneumatized. The ostiomeatal units are patent. Nasal cavity: The nasal septum is deviated towards the right. The nasal turbinates are within appropriate limits. No polyps or masses are noted. The mastoid air cells and middle ear cavities are normally aerated. There are no suspicious findings in the imaged aspects of the brain parenchyma and facial soft tissues. Note is made of extensive atherosclerotic calcifications involving the bilateral petrous, cavernous and clinoid internal carotid arteries. saturation dropped to 92% on room air with exertion. Based on the above testing patient does not need supplemental O2 during the day at rest or with activity will continue the order for supplemental O2 at night until overnight pulse oximetry can be performed. Assessment      Diagnosis Orders   1. Cough      resolved   2. Chronic respiratory failure with hypoxia (HCC)     3. Nocturnal hypoxia     4. Chronic heart failure with preserved ejection fraction (HCC)  6 Minute Walk Test    Pulse oximetry, overnight   5.  Acute and chronic respiratory failure with hypoxia (HCC)        -Chronic respiratory failure 2/2 CHF with diastolic dysfunction  -Cough resolved multiple contributing factors likely 2/2 pulmonary edema, hiatal hernia, CHF, viral URI  -Minimal obstruction in peripheral airways PFT 2013 no evidence of COPD had significant Second hand exposure to tobacco  Plan   -6 MWT today to clarify O2 needs, gets through [facility] supplemental O2 needed during the day will keep O2 for now until overnight pulse oximetry completed  -Overnight pulse oximetry on RA, will call patient with results   -Patient denies respiratory complaint at this time CXR improved  -No chronic respiratory condition, Previous PFT without evidence of obstruction   -Advised to take flu vaccine this coming season.  -Advised patient to call office with any changes, questions, or concerns regarding respiratory status    Will see Berny Mckeon back as needed for respiratory complaint, will call with results from overnight pulse oximetry and reorder/discontinue supplemental O2 based on results     Nathanael Pink CNP  7/23/2019 Adequate: hears normal conversation without difficulty

## 2019-09-03 NOTE — TELEPHONE ENCOUNTER
Called and spoke with patient regarding overnight pulse oximetry testing results. patient desaturated overnight SpO2 <89% for >5 minutes cumulatively. Order placed for O2 3 LPM overnight and to repeat pulse oximetry testing on 3 LPM via NC. Will have office staff assist in scheduling repeat study and notifying DME of flow change.

## 2019-11-07 PROBLEM — K92.2 LOWER GI BLEED: Status: ACTIVE | Noted: 2019-01-01

## 2019-11-20 NOTE — ED NOTES
Pt reassessed. Resting on cot, respirations even and unlabored. Watching TV, continued on 4 L of oxygen via NC at this time. States 2/10 pain in chest, \"heaviness. \"  Updated on POC and admission status, no complaints. SR up x2, call light in reach, telemetry continued, family at bedside, will continue to monitor.      Madie Hollingsworth RN  11/19/19 [id number]

## 2019-11-20 NOTE — PROGRESS NOTES
Pt admitted to  6K6 from ED. Complaints: Chest pain / discomfort. IV site in left wrist. No redness or swelling noted. INT. Vital signs obtained. Assessment and data collection initiated. Two nurse skin assessment performed by Chun Cardenas and Valeriy Padron RN. Oriented to room. Policies and procedures for  explained. All questions answered with no further questions at this time. Fall prevention and safety brochure discussed with patient. Bed alarm on. Call light in reach. The best day to schedule a follow up Dr appointment is:  Monday a.m.

## 2019-11-20 NOTE — ED NOTES
Pt updated on inpatient room assignment. Pt denies needs at this time.      Indiana Singh RN  11/20/19 [id number]

## 2019-11-20 NOTE — ED PROVIDER NOTES
Systems   Constitutional: Negative for appetite change, chills, fatigue and fever. HENT: Negative for congestion, ear pain, rhinorrhea and sore throat. Eyes: Negative for pain, discharge and visual disturbance. Respiratory: Positive for cough (productive) and shortness of breath. Negative for wheezing. Cardiovascular: Positive for chest pain (sharp mid-sternal). Negative for palpitations and leg swelling. Gastrointestinal: Positive for nausea. Negative for abdominal pain, diarrhea and vomiting. Genitourinary: Negative for difficulty urinating, dysuria, hematuria and vaginal discharge. Musculoskeletal: Positive for arthralgias (left shoulder pain ). Negative for back pain, joint swelling and neck pain. Skin: Negative for pallor and rash. Neurological: Negative for dizziness, syncope, weakness, light-headedness, numbness and headaches. Hematological: Negative for adenopathy. Psychiatric/Behavioral: Negative for confusion and suicidal ideas. The patient is not nervous/anxious. PAST MEDICAL HISTORY    has a past medical history of Blood transfusion reaction, CAD (coronary artery disease), CHF (congestive heart failure) ([facility] [address] CKD (chronic kidney disease) stage 3, GFR 30-59 ml/min ([facility]), GERD (gastroesophageal reflux disease), History of bleeding ulcers, Hypertension, Large hiatal hernia, Pneumonia, and Thyroid disease. SURGICAL HISTORY      has a past surgical history that includes Cholecystectomy; Appendectomy; Tonsillectomy; Hysterectomy; Breast surgery (Left, 1959); fracture surgery (Right, 1986); Endoscopy, colon, diagnostic; Colonoscopy; and eye surgery (Bilateral, 2006). CURRENT MEDICATIONS       Previous Medications    ACETAMINOPHEN 650 MG TABS    Take 650 mg by mouth every 4 hours as needed. ALBUTEROL (PROAIR HFA) 108 (90 BASE) MCG/ACT INHALER    Inhale 2 puffs into the lungs every 6 hours as needed for Wheezing.     ALBUTEROL (PROVENTIL) (2.5 MG/3ML) 0.083% NEBULIZER SOLUTION    Take 3 mLs by nebulization 4 times daily    ALPRAZOLAM (XANAX) 0.25 MG TABLET    Take 0.25 mg by mouth 3 times daily as needed for Anxiety. AMLODIPINE (NORVASC) 5 MG TABLET    Take 1 tablet by mouth daily    ASPIRIN 81 MG CHEWABLE TABLET    Take 1 tablet by mouth daily    ATORVASTATIN (LIPITOR) 10 MG TABLET    Take 1 tablet by mouth nightly    BUMETANIDE (BUMEX) 1 MG TABLET    Take 1 tablet by mouth daily    CHOLECALCIFEROL (VITAMIN D3) 1000 UNITS CAPS    Take 1 tablet by mouth daily     CLOPIDOGREL (PLAVIX) 75 MG TABLET    Take 1 tablet by mouth daily    DOXYCYCLINE HYCLATE (VIBRA-TABS) 100 MG TABLET    Take 1 tablet by mouth every 12 hours for 4 days    ISOSORBIDE MONONITRATE (IMDUR) 30 MG EXTENDED RELEASE TABLET    Take 1 tablet by mouth every 12 hours    LEVOTHYROXINE (SYNTHROID) 88 MCG TABLET    Take 1 tablet by mouth Daily    MAGNESIUM 500 MG TABS    Take 1 tablet by mouth daily     NITROGLYCERIN (NITROSTAT) 0.4 MG SL TABLET    Place 1 tablet under the tongue every 5 minutes as needed for Chest pain. PANTOPRAZOLE (PROTONIX) 40 MG TABLET    Take 1 tablet by mouth every morning (before breakfast)    POTASSIUM CHLORIDE (KLOR-CON M) 10 MEQ EXTENDED RELEASE TABLET    Take 1 tablet by mouth daily    SUCRALFATE (CARAFATE) 1 GM TABLET    Take 1 tablet by mouth 4 times daily (before meals and nightly)       ALLERGIES     is allergic to avelox [moxifloxacin]; pcn [penicillins]; prednisone; ranexa [ranolazine]; rocephin [ceftriaxone sodium-lidocaine]; sulfur; zithromax [azithromycin]; and codeine. FAMILY HISTORY     She indicated that her mother is . She indicated that her father is . She indicated that her sister is . She indicated that both of her brothers are . family history includes Cancer in her sister; Early Death in her mother; Heart Disease in her brother, father, mother, and sister; Substance Abuse in her brother and brother.     SOCIAL HISTORY    reports that she has never smoked. She has never used smokeless tobacco. She reports that she does not drink alcohol or use drugs. PHYSICAL EXAM     INITIAL VITALS:  height is 4' 11\" (1.499 m) and weight is 180 lb (81.6 kg). Her oral temperature is 98.7 °F (37.1 °C). Her blood pressure is 147/71 (abnormal) and her pulse is 76. Her respiration is 20 and oxygen saturation is 96%. Physical Exam  Vitals signs and nursing note reviewed. Constitutional:       General: She is not in acute distress. Appearance: She is well-developed. She is not toxic-appearing or diaphoretic. HENT:      Head: Normocephalic and atraumatic. Right Ear: Hearing normal.      Left Ear: Hearing normal.      Nose: Nose normal. No rhinorrhea. Mouth/Throat:      Pharynx: Uvula midline. No oropharyngeal exudate. Eyes:      General: Lids are normal. No scleral icterus. Conjunctiva/sclera: Conjunctivae normal.      Pupils: Pupils are equal, round, and reactive to light. Neck:      Musculoskeletal: Normal range of motion and neck supple. No neck rigidity. Trachea: No tracheal deviation. Cardiovascular:      Rate and Rhythm: Normal rate and regular rhythm. Heart sounds: Normal heart sounds. No murmur. Pulmonary:      Effort: Pulmonary effort is normal. No respiratory distress. Breath sounds: Normal breath sounds. No stridor. No decreased breath sounds or wheezing. Abdominal:      General: There is no distension. Palpations: Abdomen is soft. Abdomen is not rigid. Tenderness: There is no tenderness. Musculoskeletal: Normal range of motion. Comments: Movement normal as observed   Lymphadenopathy:      Cervical: No cervical adenopathy. Skin:     General: Skin is warm and dry. Coloration: Skin is not pale. Findings: No rash. Neurological:      Mental Status: She is alert and oriented to person, place, and time.       Gait: Gait normal.      Comments: No gross deficits observed Psychiatric:         Speech: Speech normal.         Behavior: Behavior normal.         Thought Content: Thought content normal.         DIFFERENTIAL DIAGNOSIS:   Including but not limited to: ACS, pneumonia, GERD, arrhythmia, esophagitis. DIAGNOSTIC RESULTS     EKG: All EKG's are interpreted by [facility] who either signs or Co-signs this chart in the absence of a cardiologist.  Livia Garza. Rate: 78 bpm  MD interval: * ms  QRS duration: 122 ms  QTc: 410 ms  P-R-T axes: 86, -75, 257  Atrial flutter with 4:1 AV conduction  Left axis deviation  Right bundle branch block  T wave abnormality, consider inferolateral ischemia   Abnormal ECG      RADIOLOGY: non-plain film images(s) such as CT,Ultrasound and MRI are read by the radiologist.  Plain radiographic images are visualized and preliminarily interpreted by the emergency physician unless otherwise stated below. XR CHEST STANDARD (2 VW)   Final Result   1. Interval increasing left pleural effusion and/or underlying atelectasis. Infiltrate is not excluded. 2. Partial visualization of suspected hiatal hernia. **This report has been created using voice recognition software. It may contain minor errors which are inherent in voice recognition technology. **      Final report electronically signed by Dr. Rachael Cuevas on 11/19/2019 9:26 PM          LABS:   Labs Reviewed   BASIC METABOLIC PANEL - Abnormal; Notable for the following components:       Result Value    Chloride 92 (*)     Glucose 119 (*)     BUN 24 (*)     CREATININE 1.5 (*)     All other components within normal limits   CBC WITH AUTO DIFFERENTIAL - Abnormal; Notable for the following components:    RBC 3.81 (*)     Hemoglobin 11.3 (*)     Hematocrit 34.4 (*)     Eosinophils Absolute 0.6 (*)     All other components within normal limits   TROPONIN - Abnormal; Notable for the following components:    Troponin T 0.303 (*)     All other components within normal limits   BRAIN (cardiologist)    PROCEDURES:  None    FINAL IMPRESSION      1. Chest pain, unspecified type    2. Pleural effusion on left    3. Elevated troponin    4. Chronic kidney disease, unspecified CKD stage    5. Acute pain of left shoulder          DISPOSITION/PLAN     1. Chest pain, unspecified type    2. Pleural effusion on left    3. Elevated troponin    4. Chronic kidney disease, unspecified CKD stage    5. Acute pain of left shoulder    Admission      (Please note that portions of this note were completed with a voice recognition program.  Efforts were made to edit the dictations but occasionally words are mis-transcribed.)    Scribe:  Ana Marte 11/19/19 7:51 PM Scribing for and in the presence of AMRITA Hansen. Signed by: Santos Monte 11/19/19 11:08 PM    Provider:  I personally performed the services described in the documentation, reviewed and edited the documentation which was dictated to the scribe in my presence, and it accurately records my words and actions.     AMRITA Hansen 11/19/19 11:08 PM    AMRITA Hansen PA-C  11/19/19 0789

## 2019-11-20 NOTE — ED TRIAGE NOTES
Pt presents to the ED via EMS with c/o chest pain. Pt states that chest pain started earlier today and she took 3 sublingual nitroglycerin tablets at home before EMS arrival and felt releif. EMS also administered an albuterol nebulizer en route. Pt was discharged from the hospital on Friday after being admitted for pneumonia. Pt also complains of left shoulder pain and states she fell about two weeks ago. Pt normally is on 2L O2 via nasal cannula at home. Upon arrival pt O2 <90. Pt placed on 4L upon arrival. VSS, respirations even and unlabored.

## 2019-11-20 NOTE — CARE COORDINATION
CASE MANAGEMENT OBSERVATION NOTE       11/20/19, 7:41 AM    Elizabeth Nails       Admitted from: ED   11/19/2019/ 1904   Location: -06/006-A Reason for admit: Chest pain [R07.9]   Admit order signed?: no - Dr Janes Bang      Procedure: na    Pertinent Info/Orders/Treatment Plan:  Telemetry, supplemental oxygen, troponin, Pro BNP 5592, BUN 24, creatinine 1.5, GFR 32,     PCP: Lynda Mitchell MD    Discharge Plan:     Met with Clyde Hinojosa 11/8/19- see this CM note. Clyde Hinojosa is from home with daughter and was current with [facility]. She was driving short distances p/t illness, her son lives 3 blocks away, she uses a cane, has a 4-wheeled walker, has home oxygen at 2L/min thru [facility], + current PCP, and currently is able to afford medicines. See SW note from today. Patient just here recently and discharged on 11/15/19 with GI bleed. Discharged with home oxgen 3 L /min thru [facility] with activity and 1L/min while at rest. See Iesha Churchill CNP discharge summary.

## 2019-11-20 NOTE — PROGRESS NOTES
Patient had no c/o chest pain and/or any other pain noted since being admitted to the floor. Very pleasant. Gait steady. In A-flutter, but normal for patient. Chest congestion noted. Coughing up phlegm. Rhonchi in bilateral lower lobes. Awaiting new orders from Dr Óscar Kim.

## 2019-11-20 NOTE — ED NOTES
Bed: 005A  Expected date: 11/19/19  Expected time:   Means of arrival: [address] [facility] [address] [facility]  Comments:      Gregg Goncalves RN  11/19/19 [id number]

## 2019-11-20 NOTE — CARE COORDINATION
11/20/19, 11:29 AM    DISCHARGE PLANNING EVALUATION    SW order received that pt is current with [facility]. Full SW assessment completed 11/8/19, see for details. SW spoke with pt and daughter, pt plans on returning home with daughter and current services with [facility]. Pt is requesting Aides be added to her [facility] care plan. RAE spoke with Angel Boles with [facility], pt is current for RN only, they will contact pt PCP to get Aides added to [facility] plan. Pt and daughter deny any additional needs.

## 2019-11-21 NOTE — PROGRESS NOTES
Admit Date: 11/19/2019  Hospital day 2    Subjective:     Patient chest apin and sob . Medication side effects: none    Scheduled Meds:   bumetanide  2 mg Intravenous BID    albuterol  2.5 mg Nebulization 4x Daily    amLODIPine  5 mg Oral Daily    Vitamin D  1 tablet Oral Daily    clopidogrel  75 mg Oral Daily    levothyroxine  88 mcg Oral Daily    Magnesium Oxide  500 mg Oral Daily    pantoprazole  40 mg Oral QAM AC    potassium chloride  10 mEq Oral Daily    sucralfate  1 g Oral 4x Daily AC & HS    metoprolol tartrate  25 mg Oral BID    isosorbide mononitrate  60 mg Oral Daily    aspirin  324 mg Oral Once     Continuous Infusions:  PRN Meds:acetaminophen, albuterol sulfate HFA, nitroGLYCERIN, ALPRAZolam    Review of Systems  Pertinent items are noted in HPI. Objective:     Patient Vitals for the past 8 hrs:   BP Temp Temp src Pulse Resp SpO2   11/21/19 1602 (!) 114/56 97.8 °F (36.6 °C) Oral 74 18 95 %   11/21/19 1123 (!) 114/58 97.3 °F (36.3 °C) Oral 70 18 94 %     I/O last 3 completed shifts: In: 920 [P.O.:920]  Out: 400 [Urine:400]    No change     ECG: af.   Data Review:   CBC:  Lab Results   Component Value Date    WBC 8.5 11/19/2019    RBC 3.81 11/19/2019    HGB 11.3 11/19/2019    HCT 34.4 11/19/2019    MCV 90.3 11/19/2019    MCH 29.7 11/19/2019    MCHC 32.8 11/19/2019    RDW 13.8 11/18/2017     11/19/2019    MPV 9.4 11/19/2019     BMP  Lab Results   Component Value Date     11/21/2019    K 3.8 11/21/2019    K 4.9 11/13/2019    CL 91 11/21/2019    CO2 32 11/21/2019    BUN 22 11/21/2019    CREATININE 1.6 11/21/2019    CALCIUM 9.6 11/21/2019      COAG PROFILE:  Lab Results   Component Value Date    APTT 77.1 11/14/2013       Assessment:     Active Problems:    Chest pain  Resolved Problems:    * No resolved hospital problems.  *      Plan:   Still having severe chest pain    will adjust meds    consider heart cath as last resort    sob    htn    obesity    poor conditioning

## 2019-11-22 NOTE — CARE COORDINATION
Creat 1.7, last trop 0.310. Mucomyst bid. Still awaiting Dr. Geraldine Caldwell, and timing of cath. Electronically signed by Tyrese Vasquez RN on 11/22/2019 at 10:15 AM     Mary GREEN states that Nora Tariq no longer wishes to have the heart cath. Anticipate discharge today.   Electronically signed by Tyrese Vasquez RN on 11/22/2019 at 11:04 AM

## 2019-11-23 NOTE — PROGRESS NOTES
[facility]   Sedation/Analgesia History and Physical    Pt Name: Elizabeth Nails  MRN: [id number]  YOB: 1926  Provider Performing Procedure: Darleen Meredith MD  Primary Care Physician: Lynda Mitchell MD      Pre-Procedure: {CARDIAC SYMPTOMS:[id number]::\"Chest pain, coronary artery disease,  unstable angina     Consent: I have discussed with the patient and/or the patient representative the indication, alternatives, and the possible risks and/or complications of the planned procedure and the anesthesia methods. The patient and/or representative appear to understand and agree to proceed. Medical History:   has a past medical history of Blood transfusion reaction, CAD (coronary artery disease), CHF (congestive heart failure) ([facility] [address] CKD (chronic kidney disease) stage 3, GFR 30-59 ml/min ([facility]), GERD (gastroesophageal reflux disease), History of bleeding ulcers, Hypertension, Large hiatal hernia, Pneumonia, and Thyroid disease. .    Surgical History:     has a past surgical history that includes Cholecystectomy; Appendectomy; Tonsillectomy; Hysterectomy; Breast surgery (Left, 1959); fracture surgery (Right, 1986); Endoscopy, colon, diagnostic; Colonoscopy; and eye surgery (Bilateral, 2006). Allergies:    Allergies as of 11/19/2019 - Review Complete 11/19/2019   Allergen Reaction Noted    Avelox [moxifloxacin] Other (See Comments) 10/31/2013    Pcn [penicillins] Itching 10/31/2013    Prednisone Other (See Comments) 10/31/2013    Ranexa [ranolazine]  01/09/2019    Rocephin [ceftriaxone sodium-lidocaine] Other (See Comments) 10/31/2013    Sulfur Hives 10/31/2013    Zithromax [azithromycin] Other (See Comments) 10/31/2013    Codeine Nausea And Vomiting 10/31/2013       Medications:   Coumadin use last 5 days:  No  Antiplatelet drug therapy use last 5 days:  Yes  Other anticoagulant use last 5 days:  No   ranolazine  500 mg Oral BID    acetylcysteine  600 mg Oral BID    albuterol 2.5 mg Nebulization 4x Daily    amLODIPine  5 mg Oral Daily    Vitamin D  1 tablet Oral Daily    clopidogrel  75 mg Oral Daily    levothyroxine  88 mcg Oral Daily    Magnesium Oxide  500 mg Oral Daily    pantoprazole  40 mg Oral QAM AC    potassium chloride  10 mEq Oral Daily    sucralfate  1 g Oral 4x Daily AC & HS    metoprolol tartrate  25 mg Oral BID    isosorbide mononitrate  60 mg Oral Daily    aspirin  324 mg Oral Once     Prior to Admission medications    Medication Sig Start Date End Date Taking? Authorizing Provider   isosorbide mononitrate (IMDUR) 30 MG extended release tablet Take 30 mg by mouth daily   Yes Historical Provider, MD   ALPRAZolam (XANAX) 0.5 MG tablet Take 0.5 mg by mouth nightly as needed for Sleep.    Yes Historical Provider, MD   albuterol (PROVENTIL) (2.5 MG/3ML) 0.083% nebulizer solution Take 3 mLs by nebulization 4 times daily 11/15/19  Yes LACY Gallegos CNP   sucralfate (CARAFATE) 1 GM tablet Take 1 tablet by mouth 4 times daily (before meals and nightly) 11/13/19  Yes LACY Gallegos CNP   amLODIPine (NORVASC) 5 MG tablet Take 1 tablet by mouth daily 4/23/19  Yes Demarco Victor MD   bumetanide (BUMEX) 1 MG tablet Take 1 tablet by mouth daily 4/23/19  Yes Demarco Victor MD   clopidogrel (PLAVIX) 75 MG tablet Take 1 tablet by mouth daily 4/23/19  Yes Demarco Victor MD   levothyroxine (SYNTHROID) 88 MCG tablet Take 1 tablet by mouth Daily 4/23/19  Yes Demarco Victor MD   potassium chloride (KLOR-CON M) 10 MEQ extended release tablet Take 1 tablet by mouth daily 4/22/19  Yes Demarco Victor MD   pantoprazole (PROTONIX) 40 MG tablet Take 1 tablet by mouth every morning (before breakfast) 1/12/19  Yes Arianna Villarreal MD   Magnesium 500 MG TABS Take 1 tablet by mouth daily    Yes Historical Provider, MD   Cholecalciferol (VITAMIN D3) 1000 units CAPS Take 1 tablet by mouth daily    Yes Historical Provider, MD acetaminophen 650 MG TABS Take 650 mg by mouth every 4 hours as needed. 1/13/15  Yes Yana Lomeli MD   albuterol (PROAIR HFA) 108 (90 BASE) MCG/ACT inhaler Inhale 2 puffs into the lungs every 6 hours as needed for Wheezing. 1/13/15  Yes Yana Lomeli MD   nitroGLYCERIN (NITROSTAT) 0.4 MG SL tablet Place 1 tablet under the tongue every 5 minutes as needed for Chest pain. 11/2/13  Yes Marsha Perez MD       Vital Signs  Vitals:    11/22/19 1654   BP: (!) 122/57   Pulse: 66   Resp: 18   Temp: 97.7 °F (36.5 °C)   SpO2: 93%       Physical:  Heart:  regular rate and rhythm  Lungs:  Clear  Abdomen:  Soft  Mental Status:  Alert and Oriented    Planned Procedure:  Left Heart Cath and Possible Percutaneous Coronary Intervention    Sedation/ Anesthesia Plan: Midazolam and Sublimaze    ASA Classification: Class 3 - A patient with severe systemic disease that limits activity but is not incapacitating    Mallampati Airway Classification: II (soft palate, uvula, fauces visible)    · Pre-procedure diagnostic studies complete and results available. · Previous sedation/anesthesia experiences assessed. · The patient is an appropriate candidate to undergo the planned procedure sedation and anesthesia. (Refer to nursing sedation/analgesia documentation record)  · Formulation and discussion of sedation/procedure plan, risks, and expectations with patient and/or responsible adult completed. · Patient examined immediately prior to the procedure.  (Refer to nursing sedation/analgesia documentation record)    Eunice Frost MD  Electronically signed 11/22/2019 at 7:08 PM  Patient Name: [facility]   Medical Record Number: [id number]  Date: 11/22/2019   Time: 7:08 PM   Room/Bed: [facility]06/006-A

## 2019-11-23 NOTE — FLOWSHEET NOTE
11/22/19 2231   Provider Notification   Reason for Communication Evaluate  (pt allergic to Moxifloxacin and was ordered Levofloxacin )   Provider Name KARMA [facility] TATIANA    Provider Notification Physician   Method of Communication Secure Message   Response Waiting for response   Notification Time [phone]     Pharmacy wanted to clarify that Dr. Russ Brennan still wanted Oscar Desirae given d/t pt allergy to Moxifloxican and Dr Russ Brennan returned page with Anson Wade go ahead and give the medication\".

## 2019-11-25 NOTE — CARE COORDINATION
11/25/19, 9:07 AM    DISCHARGE ON GOING EVALUATION    Anuj Pennington day: 2  Location: -06/006-A Reason for admit: Chest pain [R07.9]  Pneumonia [J18.9]   Treatment Plan of Care: IV ATBs, cough syrup. Requiring 2L O2, sat is 93%. Julieta Claudio states Dr. Citlalli Givens was in already this a.m and states she will stay a couple more days. PCP: Sandi Osman MD  Readmission Risk Score: 19%  Patient Goals/Plan/Treatment Preferences: Plans return home with daughter and [facility].

## 2019-11-25 NOTE — PROGRESS NOTES
Admit Date: 11/19/2019  Hospital day 4    Subjective:     Patient has coughing and sob . Medication side effects: none    Scheduled Meds:   cefTRIAXone (ROCEPHIN) IV  1 g Intravenous Q24H    azithromycin  250 mg Intravenous Q24H    bumetanide  1 mg Intravenous BID    potassium chloride  10 mEq Oral BID    ranolazine  500 mg Oral BID    albuterol  2.5 mg Nebulization 4x Daily    amLODIPine  5 mg Oral Daily    Vitamin D  1 tablet Oral Daily    clopidogrel  75 mg Oral Daily    levothyroxine  88 mcg Oral Daily    Magnesium Oxide  500 mg Oral Daily    pantoprazole  40 mg Oral QAM AC    sucralfate  1 g Oral 4x Daily AC & HS    metoprolol tartrate  25 mg Oral BID    isosorbide mononitrate  60 mg Oral Daily    aspirin  324 mg Oral Once     Continuous Infusions:  PRN Meds:guaiFENesin-dextromethorphan, ondansetron, benzonatate, acetaminophen, albuterol sulfate HFA, nitroGLYCERIN, ALPRAZolam    Review of Systems  Pertinent items are noted in HPI. Objective:     Patient Vitals for the past 8 hrs:   BP Temp Temp src Pulse Resp SpO2   11/24/19 2041 -- -- -- -- 18 90 %   11/24/19 2018 (!) 117/59 99.8 °F (37.7 °C) Oral 54 18 94 %   11/24/19 1545 121/60 98.2 °F (36.8 °C) Oral 55 18 92 %     I/O last 3 completed shifts:   In: 627.9 [P.O.:340; I.V.:287.9]  Out: 0     No change     ECG: af.   Data Review:   CBC:  Lab Results   Component Value Date    WBC 9.2 11/23/2019    RBC 3.96 11/23/2019    HGB 11.6 11/23/2019    HCT 35.5 11/23/2019    MCV 89.6 11/23/2019    MCH 29.3 11/23/2019    MCHC 32.7 11/23/2019    RDW 13.8 11/18/2017     11/23/2019    MPV 9.4 11/23/2019     BMP  Lab Results   Component Value Date     11/23/2019    K 3.6 11/23/2019    K 4.9 11/13/2019    CL 86 11/23/2019    CO2 31 11/23/2019    BUN 22 11/23/2019    CREATININE 1.7 11/23/2019    CALCIUM 9.4 11/23/2019      COAG PROFILE:  Lab Results   Component Value Date    APTT 77.1 11/14/2013       Assessment:     Active Problems:    Chest

## 2019-11-26 NOTE — FLOWSHEET NOTE
Pt was with family. She was dealing with chest pain. She was hopeful. Prayer was appreciated. 11/26/19 1553   Encounter Summary   Services provided to: Patient and family together   Referral/Consult From: [address] [place] [address] Family members   Continue Visiting Yes  (11/26 )   Complexity of Encounter Low   Length of Encounter 15 minutes   Advance Care Planning Yes   Routine   Type Initial   Assessment Approachable;Calm   Intervention Wilson;Prayer;Nurtured hope   Outcome Acceptance;Expressed gratitude;Encouraged; Hopeful;Receptive   Advance Directives (For Healthcare)   Healthcare Directive Yes, patient has an advance directive for healthcare treatment

## 2019-11-26 NOTE — PROCEDURES
A Bladder scan was performed at 1110 . The patient's last void was at 1050 . The residual amount was measured to be 74 ML. Report of results was given to Alvino Regalado RN.

## 2019-11-27 NOTE — FLOWSHEET NOTE
11/26/19 1954   Provider Notification   Reason for Communication Evaluate   Provider Name Dr. Disha Castellanos   Provider Notification Physician   Method of Communication Face to face   Response See orders   Notification Time 1953     Dr. Disha Castellanos seen pt in person, new order for bumex PO 2 times daily, first dose now.

## 2019-11-27 NOTE — PROGRESS NOTES
pain    Pneumonia  Resolved Problems:    * No resolved hospital problems.  *      Plan:   Acute systolic chf    lll pneumonia    chronic atrial fib     Patient dnr    anaemia    check lab at    home soon

## 2019-11-30 NOTE — PROGRESS NOTES
Pt continues on non-rebreather. Oxygen level between 64-81%. Notified Dr. Melissa Nugent via phone. New order for CPAP and lasix 40mg IV x once. Will continue to monitor.

## 2019-11-30 NOTE — PROGRESS NOTES
Post-fall pharmacy consult    Pharmacy has been consulted to review medication profile for fall risk.   Medications increasing risk of falling: amlodipine, alprazolam, bumetanide, isosorbide mononitrate, metoprolol  Medications increasing risk of bleeding: aspirin, clopidogrel  Findings discussed with Maryanne GREEN  Recommendations: RN reported that patient was caught as she was about to fall and had no trauma or injury; continue to monitor

## 2019-11-30 NOTE — PROGRESS NOTES
Pt was placed on continuous CPAP during night shift for low O2 sats. Respiratory was consulted this morning to see if patient could be taken off to eat her meals. They recommended 6 L nasal cannula for meals. At breakfast this was done, pt sats were in the mid 90s, however, after about an hour she would drop into the mid 60-70s. She was placed back on the CPAP and sats would come back up to mid 90s. Pt would be asymptomatic during the drops into the 60-70s. She was coherent and able to speak clearly. Lips and nail beds did not appear blue. Respirationss were 18-20. For lunch and supper, the same routine would be followed with patient continuing to drop in saturation on the 6 L via NC after about an hour into the 60-70s and placed back on the CPAP and recovering into the 90s. Pt was checked after supper and her sats were in the 70s and fell to the 60s. Saturation was checked in bilateral fingers and read 64. Saturation was checked on the forehead and it read 81. Respiratory was consulted again to see which reading was more accurate. She stated the forehead. Respiratory evaluated patient, as she had a treatment due, and her forehead did eventually read in the 60s confirming the finger reading. Respiratory requested ABGs be drawn to ensure that the proper reading was indeed being given. They were pH 7.45, PCO2 61, PO2 31, HCO3 42, base excess 15.3 all of which were taken while on 6 L via NC. Pulse ox on finger and forehead were 66. Pt was placed back on CPAP and recovered to 96%. Pulmonology was consulted today and will see.

## 2019-11-30 NOTE — FLOWSHEET NOTE
11/30/19 1458   Provider Notification   Reason for Communication Evaluate   Provider Name Safi Gama   Provider Notification Physician   Method of Communication Secure Message   Response Waiting for response   Notification Time [phone]   Dr. Conner Shalonda will see today.

## 2019-11-30 NOTE — PROGRESS NOTES
Admit Date: 11/19/2019  Hospital day a few    Subjective:     Patient SOB . Medication side effects: none    Scheduled Meds:   polyethylene glycol  17 g Oral Daily    docusate sodium  100 mg Oral Daily    bumetanide  2 mg Oral BID    cefTRIAXone (ROCEPHIN) IV  1 g Intravenous Q24H    potassium chloride  10 mEq Oral BID    ranolazine  500 mg Oral BID    albuterol  2.5 mg Nebulization 4x Daily    amLODIPine  5 mg Oral Daily    Vitamin D  1 tablet Oral Daily    clopidogrel  75 mg Oral Daily    levothyroxine  88 mcg Oral Daily    Magnesium Oxide  500 mg Oral Daily    pantoprazole  40 mg Oral QAM AC    sucralfate  1 g Oral 4x Daily AC & HS    metoprolol tartrate  25 mg Oral BID    isosorbide mononitrate  60 mg Oral Daily    aspirin  324 mg Oral Once     Continuous Infusions:  PRN Meds:calcium carbonate, guaiFENesin-dextromethorphan, ondansetron, benzonatate, acetaminophen, albuterol sulfate HFA, nitroGLYCERIN, ALPRAZolam    Review of Systems  Pertinent items are noted in HPI. Objective:     Patient Vitals for the past 8 hrs:   BP Temp Temp src Pulse Resp SpO2   11/30/19 1245 -- -- -- -- -- 94 %   11/30/19 1130 (!) 151/66 98 °F (36.7 °C) Oral 53 18 97 %   11/30/19 1031 -- -- -- -- -- 94 %   11/30/19 1027 -- -- -- -- -- (!) 77 %   11/30/19 0945 133/60 97.8 °F (36.6 °C) Oral 58 18 92 %     I/O last 3 completed shifts:   In: 920 [P.O.:920]  Out: -     NO CHANGE     ECG: AF.   Data Review:   CBC:  Lab Results   Component Value Date    WBC 7.2 11/28/2019    RBC 3.83 11/28/2019    HGB 10.9 11/28/2019    HCT 34.1 11/28/2019    MCV 89.0 11/28/2019    MCH 28.5 11/28/2019    MCHC 32.0 11/28/2019    RDW 13.8 11/18/2017     11/28/2019    MPV 9.7 11/28/2019     BMP  Lab Results   Component Value Date     11/28/2019    K 4.7 11/28/2019    K 4.9 11/13/2019    CL 84 11/28/2019    CO2 37 11/28/2019    BUN 19 11/28/2019    CREATININE 1.3 11/28/2019    CALCIUM 10.0 11/28/2019      COAG PROFILE:  Lab Results Component Value Date    APTT 77.1 11/14/2013       Assessment:     Active Problems:    Chest pain    Pneumonia  Resolved Problems:    * No resolved hospital problems.  *      Plan:   Patient still sob and weak    chronic atrial fib    h/o gi bleed in the past    will continue asa and plavix    refuse NH PLACEMENT       LARGE HIATENAL HERNIA    VALVULAR DISEASE    DNR STATUS

## 2019-12-01 NOTE — CONSULTS
[facility]    Patient - Harsh Reese   MRN -  [id number]   [facility] # - [de-identified]   - 1926      Date of Admission -  2019  7:04 PM  Date of evaluation -  2019  Room - -06/006-SHERRIE Su MD Primary Care Physician - Gaurav Barnes MD   Chief Complaint   SOB  Active Hospital Problem List      Active Hospital Problems    Diagnosis Date Noted    Pneumonia [J18.9] 04/15/2019    Chest pain [R07.9] 2019     HPI     80year old female with past medical history of CAD, valvular heart disease, Afib, GI bleeding, hiatal hernia, CHF and CKD, and COPD on home oxygen 2 lpm, she was admitted to the hospital 19 due to worsening shortness of breath and cough for the last few days associated with tightness in the chest and and dizziness. Initial CXR showed LLL atelectasis/infiltrates, she was with antibiotics and diuretics. She was initially on O2 by nasal canula but yesterday required NIPPV and HFNC alternatively to maintain her saturation. CTA chest showed no PE, or pneumonia, but large hiatal hernia and severe pulmonary hypertension. Nurse described a drop in saturation on the 6 L via NC after about an hour into the 60-70s and placed back on the CPAP and recovering into the 90s. Pt was checked after supper and her sats were in the 70s and fell to the 60s. Saturation was checked in bilateral fingers and read 64. Saturation was checked on the forehead and it read 81. Respiratory was consulted again to see which reading was more accurate. She stated the forehead. Respiratory evaluated patient, as she had a treatment due, and her forehead did eventually read in the 60s confirming the finger reading. Respiratory requested ABGs be drawn to ensure that the proper reading was indeed being given.  They were pH 7.45, PCO2 61, PO2 31, HCO3 42    Past Medical History         Diagnosis Date    Blood transfusion reaction     CAD (coronary artery disease)     [facility]est[facility] failure) ([facility])     CKD (chronic kidney disease) stage 3, GFR 30-59 ml/min ([facility]) 1/2/2015    GERD (gastroesophageal reflux disease)     History of bleeding ulcers     Hypertension     Large hiatal hernia 4/17/2019    Pneumonia     Thyroid disease       Past Surgical History           Procedure Laterality Date    APPENDECTOMY      BREAST SURGERY Left 1959    cyst removal     CHOLECYSTECTOMY      COLONOSCOPY      ENDOSCOPY, COLON, DIAGNOSTIC      EYE SURGERY Bilateral 2006    cataract removal    FRACTURE SURGERY Right 1986    R arm fx    HYSTERECTOMY      TONSILLECTOMY       Diet    DIET CARDIAC; Allergies    Avelox [moxifloxacin]; Pcn [penicillins]; Prednisone; Ranexa [ranolazine]; Rocephin [ceftriaxone sodium-lidocaine]; Sulfur; Zithromax [azithromycin]; and Codeine  Social History     Social History     Socioeconomic History    Marital status:       Spouse name: Not on file    Number of children: Not on file    Years of education: Not on file    Highest education level: Not on file   Occupational History    Not on file   Social Needs    Financial resource strain: Not on file    Food insecurity:     Worry: Not on file     Inability: Not on file    Transportation needs:     Medical: Not on file     Non-medical: Not on file   Tobacco Use    Smoking status: Never Smoker    Smokeless tobacco: Never Used   Substance and Sexual Activity    Alcohol use: No    Drug use: No    Sexual activity: Never   Lifestyle    Physical activity:     Days per week: Not on file     Minutes per session: Not on file    Stress: Not on file   Relationships    Social connections:     Talks on phone: Not on file     Gets together: Not on file     Attends Jewish service: Not on file     Active member of club or organization: Not on file     Attends meetings of clubs or organizations: Not on file     Relationship status: Not on file   Radha Rowe Intimate partner violence:     Fear of current or ex partner: Not on file     Emotionally abused: Not on file     Physically abused: Not on file     Forced sexual activity: Not on file   Other Topics Concern    Not on file   Social History Narrative    Not on file     Family History          Problem Relation Age of Onset    Heart Disease Mother     Early Death Mother     Heart Disease Father     Cancer Sister     Heart Disease Sister     Heart Disease Brother     Substance Abuse Brother     Substance Abuse Brother      Sleep History    Not on Home CPAP  ROS    General/Constitutional: No recent loss of weight or appetite changes. No fever or chills. HENT: Negative. Eyes: Negative. Upper respiratory tract: No nasal stuffiness or post nasal drip. Lower respiratory tract/ lungs: SOB, cough   Cardiovascular: No palpitations, chest pain or edema. Gastrointestinal: No nausea or vomiting. Neurological: No focal neurological weakness. Extremities: No tenderness. Musculoskeletal: no complaints  Genitourinary: No complaints. Hematological: Negative. Denies easy buising  Skin: No itching.   Meds    Current Medications    polyethylene glycol  17 g Oral Daily    docusate sodium  100 mg Oral Daily    bumetanide  2 mg Oral BID    potassium chloride  10 mEq Oral BID    ranolazine  500 mg Oral BID    albuterol  2.5 mg Nebulization 4x Daily    amLODIPine  5 mg Oral Daily    Vitamin D  1 tablet Oral Daily    clopidogrel  75 mg Oral Daily    levothyroxine  88 mcg Oral Daily    Magnesium Oxide  500 mg Oral Daily    pantoprazole  40 mg Oral QAM AC    sucralfate  1 g Oral 4x Daily AC & HS    metoprolol tartrate  25 mg Oral BID    isosorbide mononitrate  60 mg Oral Daily    aspirin  324 mg Oral Once     calcium carbonate, guaiFENesin-dextromethorphan, ondansetron, benzonatate, acetaminophen, albuterol sulfate HFA, nitroGLYCERIN, ALPRAZolam  IV Drips/Infusions    Vitals    Vitals    height is 4' 11\" (1.499 m) and weight is 171 lb (77.6 kg). Her oral temperature is 97.9 °F (36.6 °C). Her blood pressure is 109/59 (abnormal) and her pulse is 60. Her respiration is 16 and oxygen saturation is 96%. O2 Flow Rate (L/min): 5 L/min(decreased to 4lpm per o2 protocol)  I/O    Intake/Output Summary (Last 24 hours) at 12/1/2019 1628  Last data filed at 12/1/2019 0442  Gross per 24 hour   Intake 150 ml   Output 0 ml   Net 150 ml     Patient Vitals for the past 96 hrs (Last 3 readings):   Weight   12/01/19 0442 171 lb (77.6 kg)   11/30/19 0512 176 lb 3.2 oz (79.9 kg)   11/29/19 0332 175 lb 1.6 oz (79.4 kg)     Exam   Constitutional: Patient appears elderly, obese   Head: Normocephalic and atraumatic. Mouth/Throat: Oropharynx is clear and moist.  No oral thrush. Eyes: Conjunctivae are normal. Pupils are equal, round, and reactive to light. No scleral icterus. Neck: Neck supple. No JVD or tracheal deviation present. Cardiovascular: Regular rate, irregular rhythm, S1 and S2 with systolic murmur. no peripheral edema  Pulmonary/Chest: Diminished breath sounds bilateral with rhonchi   Abdominal: Soft. Bowel sounds audible. No distension or tenderness to palp  Musculoskeletal: Moves all extremities  Lymphadenopathy:  No cervical adenopathy. Neurological: Patient is alert and oriented to person, place, and time. Skin: Skin is warm and dry.       Labs   ABG  Lab Results   Component Value Date    PH 7.45 11/30/2019    PO2 31 11/30/2019    PCO2 61 11/30/2019    HCO3 42 11/30/2019    O2SAT 60 11/30/2019     Lab Results   Component Value Date    IFIO2 44 11/30/2019     CBC  Recent Labs     12/01/19  0524   WBC 8.2   RBC 3.87*   HGB 11.0*   HCT 33.6*   MCV 86.8   MCH 28.4   MCHC 32.7      MPV 9.4      BMP  Recent Labs     11/30/19  1445   *   K 4.0   CL 81*   CO2 35*   BUN 20   CREATININE 1.7*   GLUCOSE 108   CALCIUM 9.5     LFT  No results for input(s): AST, ALT, ALB, BILITOT, ALKPHOS, LIPASE in the last 72

## 2019-12-02 PROBLEM — I50.9 CHF (CONGESTIVE HEART FAILURE) (HCC): Status: RESOLVED | Noted: 2019-01-01 | Resolved: 2019-01-01

## 2019-12-02 PROBLEM — K52.9 GASTROENTERITIS: Status: RESOLVED | Noted: 2017-11-17 | Resolved: 2019-01-01

## 2019-12-02 PROBLEM — E87.8 HYPOCHLOREMIA: Status: ACTIVE | Noted: 2019-01-01

## 2019-12-02 PROBLEM — K52.9 GASTROENTERITIS, ACUTE: Status: RESOLVED | Noted: 2017-11-19 | Resolved: 2019-01-01

## 2019-12-02 PROBLEM — E87.6 HYPOKALEMIA: Status: RESOLVED | Noted: 2017-11-19 | Resolved: 2019-01-01

## 2019-12-02 PROBLEM — E87.1 HYPONATREMIA: Status: ACTIVE | Noted: 2019-01-01

## 2019-12-02 PROBLEM — I50.31 ACUTE CONGESTIVE HEART FAILURE WITH LEFT VENTRICULAR DIASTOLIC DYSFUNCTION (HCC): Status: RESOLVED | Noted: 2019-01-01 | Resolved: 2019-01-01

## 2019-12-02 NOTE — PROGRESS NOTES
Consult received. Chart reviewed. Spoke with patient and her family about TCU. Patient is agreeable to TCU. Plan TCU today. Patient will go to [id number]. Antoinette RN and Rajni BUSTAMANTE made aware.

## 2019-12-02 NOTE — CARE COORDINATION
12/2/19, 11:03 AM    DISCHARGE PLANNING EVALUATION    SW spoke with Patient and family in regards to discharge plan and need for continued therapy at discharge. Patient is in agreement with TCU consult.

## 2019-12-02 NOTE — PROGRESS NOTES
[facility]  INPATIENT PHYSICAL THERAPY  EVALUATION  STRZ RENAL TELEMETRY 6K - 6K-06/006-A    Time In: 7009  Time Out: 1138  Timed Code Treatment Minutes: 10 Minutes  Minutes: 27          Date: 2019  Patient Name: Mary Thacker,  Gender:  female        MRN: [id number]  : 1926  (80 y.o.)      Referring Practitioner: Johny Agarwal MD  Diagnosis: chest pain  Additional Pertinent Hx: Per EMR: \"Pt initially admitted for chest pain, elevated troponin on . Patient was recently admitted to the hospital for a GI bleed that was coming from a stomach ulcer. \"     Restrictions/Precautions:  Restrictions/Precautions: General Precautions, Fall Risk  Position Activity Restriction  Other position/activity restrictions: supplemental O2    Subjective:  Chart Reviewed: Yes  Patient assessed for rehabilitation services?: Yes  Family / Caregiver Present: No  Subjective: RN approved PT evaluation. Pt in bedside chair upon arrival and was agreeable to PT interventions. Post session, pt in bedside chair with all needs in reach. RN aware. Chair alarm on. General:  Overall Orientation Status: Within Functional Limits  Follows Commands: Within Functional Limits    Vision: Within Functional Limits    Hearing: Within functional limits         Pain:  Yes.   Pain Assessment  Pain Assessment: 0-10(value not rated)  Pain Type: Acute pain  Pain Location: Abdomen  Non-Pharmaceutical Pain Intervention(s): Ambulation/Increased Activity;Repositioned  Response to Pain Intervention: Patient Satisfied       Social/Functional History:    Lives With: Daughter(daughter has been staying with her since last discharge from hospital; prior to last admission lived alone)  Type of Home: House  Home Layout: One level  Home Access: Stairs to enter with rails  Entrance Stairs - Number of Steps: 2  Entrance Stairs - Rails: Right  Home Equipment: Bernida Shutter, 4 wheeled walker, Rolling walker, Oxygen(2L O2 24/7 at baseline) Bathroom Shower/Tub: Walk-in shower  Bathroom Toilet: Handicap height  Bathroom Equipment: Shower chair, Grab bars around toilet       ADL Assistance: [address]: Independent(had assist from cleaning prior to last admission)  Ambulation Assistance: Independent(when going out using cane, without AD with in the home)  Transfer Assistance: Independent    Active : Yes     Additional Comments: Prior to last admission, pt was living at home alone, independent with ADL/IADL tasks. Had only been home ~4 days before readmission and her daughter had been assisting her with ADL/IADL at that time. OBJECTIVE:  Range of Motion:  Right Lower Extremity: WFL  Left Lower Extremity: WFL    Strength:  Right Lower Extremity: WFL  Left Lower Extremity: [facility]  Decreased muscular endurance. Balance:  Static Standing Balance: Contact Guard Assistance  Dynamic Standing Balance: Contact Guard Assistance    Bed Mobility:  Not assessed    Transfers:  Sit to Stand: [employer], with increased time for completion, with verbal cues, to/from chair with arms  Stand to [place] [address], with increased time for completion, with verbal cues, to/from chair with arms    Ambulation:  Contact Guard Assistance, with cues for safety, with verbal cues , with increased time for completion  Distance: 20ft   Surface: Level Tile  Device:Rolling Walker  Gait Deviations: Forward Flexed Posture, Slow Laurie, Decreased Step Length Bilaterally and Decreased Weight Shift Bilaterally  Cueing for safety    Functional Outcome Measures: Completed  AM-PAC Inpatient Mobility without Stair Climbing Raw Score : 15  AM-PAC Inpatient without Stair Climbing T-Scale Score : 43.03    ASSESSMENT:  Activity Tolerance:  Patient tolerance of  treatment: fair. Limited activity tolerance. Treatment Initiated: Treatment and education initiated within context of evaluation.   Evaluation time included review of current medical information, gathering information related to past medical, social and functional history, completion of standardized testing, formal and informal observation of tasks, assessment of data and development of plan of care and goals. Treatment time included skilled education and facilitation of tasks to increase safety and independence with functional mobility for improved independence and quality of life. Pt instructed in gait and transfer training. Pt tends to sit too early requiring Dominique for safety. Increased time spent discussing the importance of mobility during admission. Pt encouraged to ambulate 3x/day with staff and sit in bedside chair for all meals. Discussed possible rehab options with family and pt present in room. Answered families questions. Post session, pt in bedside chair with all needs in reach. Assessment: Body structures, Functions, Activity limitations: Decreased functional mobility , Decreased safe awareness, Decreased balance, Decreased endurance, Increased pain  Assessment: Pt admitted 9 days ago secondary to SOB, cough, chest pain and dizziness. Pt demonstrates a decrease in her baseline at this time and will benefit from skilled PT services during this admission. Post session, pt in bedside chair with all needs in reach. RN aware. Prognosis: Good    REQUIRES PT FOLLOW UP: Yes    Discharge Recommendations:  Discharge Recommendations: Continue to assess pending progress, Subacute/Skilled Nursing Facility, 24 hour supervision or assist(If d/c home she will need 24/7 supervision and [facility] PT)    Patient Education:  PT Education: Goals, Plan of Care, PT Role    Equipment Recommendations:   Other: pt to use 2WW    Plan:  Times per week: 5xGM  Current Treatment Recommendations: Strengthening, Transfer Training, Endurance Training, Neuromuscular Re-education, Patient/Caregiver Education & Training, ROM, Equipment Evaluation, Education, & procurement, Balance Training, Home Exercise Program, Gait Training, Functional Mobility Training, Stair training, Safety Education & Training    Goals:  Patient goals : return home with dtr  Short term goals  Time Frame for Short term goals: by discharge  Short term goal 1: bed mobility with mod I for ease of getting in/out of bed  Short term goal 2: sit <> stand with mod I for ease of transfers  Short term goal 3: ambulate > 150ft with use of LRAD and supervision A to prepare for home/community mobility   Short term goal 4: negotiate 3 steps with 1 hand rail and stand-by assist to prepare for home d/c  Long term goals  Time Frame for Long term goals : N/A secondary to short ELOS    Following session, patient left in safe position with all fall risk precautions in place.

## 2019-12-02 NOTE — PROGRESS NOTES
Admit Date: 11/19/2019  Hospital day 7    Subjective:     Patient sob and chest pain . Medication side effects: none    Scheduled Meds:   polyethylene glycol  17 g Oral Daily    docusate sodium  100 mg Oral Daily    bumetanide  2 mg Oral BID    potassium chloride  10 mEq Oral BID    ranolazine  500 mg Oral BID    albuterol  2.5 mg Nebulization 4x Daily    amLODIPine  5 mg Oral Daily    Vitamin D  1 tablet Oral Daily    clopidogrel  75 mg Oral Daily    levothyroxine  88 mcg Oral Daily    Magnesium Oxide  500 mg Oral Daily    pantoprazole  40 mg Oral QAM AC    sucralfate  1 g Oral 4x Daily AC & HS    metoprolol tartrate  25 mg Oral BID    isosorbide mononitrate  60 mg Oral Daily    aspirin  324 mg Oral Once     Continuous Infusions:  PRN Meds:calcium carbonate, guaiFENesin-dextromethorphan, ondansetron, benzonatate, acetaminophen, albuterol sulfate HFA, nitroGLYCERIN, ALPRAZolam    Review of Systems  Pertinent items are noted in HPI. Objective:     Patient Vitals for the past 8 hrs:   BP Temp Temp src Pulse Resp SpO2   12/02/19 0851 -- -- -- -- -- 92 %   12/02/19 0830 124/70 97.7 °F (36.5 °C) Oral 83 16 93 %   12/02/19 0606 -- -- -- -- -- 92 %   12/02/19 0515 -- -- -- -- -- 93 %   12/02/19 0406 (!) 149/70 97.5 °F (36.4 °C) Oral 60 18 92 %   12/02/19 0111 -- -- -- -- 22 93 %     I/O last 3 completed shifts:   In: 700 [P.O.:700]  Out: 0     No change     ECG: af.   Data Review:   CBC:  Lab Results   Component Value Date    WBC 8.2 12/01/2019    RBC 3.87 12/01/2019    HGB 11.0 12/01/2019    HCT 33.6 12/01/2019    MCV 86.8 12/01/2019    MCH 28.4 12/01/2019    MCHC 32.7 12/01/2019    RDW 13.8 11/18/2017     12/01/2019    MPV 9.4 12/01/2019     BMP  Lab Results   Component Value Date     11/30/2019    K 4.0 11/30/2019    K 4.9 11/13/2019    CL 81 11/30/2019    CO2 35 11/30/2019    BUN 20 11/30/2019    CREATININE 1.7 11/30/2019    CALCIUM 9.5 11/30/2019      COAG PROFILE:  Lab Results Component Value Date    APTT 77.1 11/14/2013       Assessment:     Active Problems:    Chest pain    Pneumonia  Resolved Problems:    * No resolved hospital problems.  *      Plan:   Still having sob and chest pain    AS    possible acute chf   Pneumonia on iv antibiotics    weakeness pt/ot to evaluate    intermittent atrial fib with prolonged pauses , refuse a pace maker    patient wants comfort care only

## 2019-12-02 NOTE — PROGRESS NOTES
[place] [facility]    Patient - Margarita Elizalde   MRN -  [id number]   [facility] # - [de-identified]   - 1926      Date of Admission -  2019  7:04 PM  Date of evaluation -  2019  Room - --SHERRIE Campbell MD Primary Care Physician - Tammy Serrato MD   Chief Complaint   Shortness of breath  Active Hospital Problem List      Active Hospital Problems    Diagnosis Date Noted    Pneumonia [J18.9] 04/15/2019    Chest pain [R07.9] 2019     HPI     80year old female with past medical history of CAD, valvular heart disease, Afib, GI bleeding, hiatal hernia, CHF and CKD, on home oxygen 2 lpm, she was admitted to the hospital 19 due to worsening shortness of breath and cough for the last few days associated with tightness in the chest and and dizziness. Initial CXR showed LLL atelectasis/infiltrates, she was with antibiotics and diuretics. She was initially on O2 by nasal canula but yesterday required NIPPV and HFNC alternatively to maintain her saturation. CTA chest showed no PE, or pneumonia, but large hiatal hernia and severe pulmonary hypertension. Nurse described a drop in saturation on the 6 L via NC after about an hour into the 60-70s and placed back on the CPAP and recovering into the 90s. Pt was checked after supper and her sats were in the 70s and fell to the 60s. Saturation was checked in bilateral fingers and read 64. Saturation was checked on the forehead and it read 81. Respiratory was consulted again to see which reading was more accurate. She stated the forehead. Respiratory evaluated patient, as she had a treatment due, and her forehead did eventually read in the 60s confirming the finger reading. Respiratory requested ABGs be drawn to ensure that the proper reading was indeed being given.  They were pH 7.45, PCO2 61, PO2 31, HCO3 42      Past 24 hrs   -On 2 LPM at 96%  -Reports ongoing cough with white frothy sputum  -Afebrile  -Tolerated CPAP overnight does not like using  All other systems reviewed  Past Medical History         Diagnosis Date    Blood transfusion reaction     CAD (coronary artery disease)     [facility]     CKD (chronic kidney disease) stage 3, GFR 30-59 ml/min ([facility]) 1/2/2015    GERD (gastroesophageal reflux disease)     History of bleeding ulcers     Hypertension     Large hiatal hernia 4/17/2019    Pneumonia     Thyroid disease       Past Surgical History           Procedure Laterality Date    APPENDECTOMY      BREAST SURGERY Left 1959    cyst removal     CHOLECYSTECTOMY      COLONOSCOPY      ENDOSCOPY, COLON, DIAGNOSTIC      EYE SURGERY Bilateral 2006    cataract removal    FRACTURE SURGERY Right 1986    R arm fx    HYSTERECTOMY      TONSILLECTOMY       Diet    DIET CARDIAC; Allergies    Avelox [moxifloxacin]; Pcn [penicillins]; Prednisone; Ranexa [ranolazine]; Rocephin [ceftriaxone sodium-lidocaine]; Sulfur; Zithromax [azithromycin]; and Codeine  Social History     Social History     Socioeconomic History    Marital status:       Spouse name: Not on file    Number of children: Not on file    Years of education: Not on file    Highest education level: Not on file   Occupational History    Not on file   Social Needs    Financial resource strain: Not on file    Food insecurity:     Worry: Not on file     Inability: Not on file    Transportation needs:     Medical: Not on file     Non-medical: Not on file   Tobacco Use    Smoking status: Never Smoker    Smokeless tobacco: Never Used   Substance and Sexual Activity    Alcohol use: No    Drug use: No    Sexual activity: Never   Lifestyle    Physical activity:     Days per week: Not on file     Minutes per session: Not on file    Stress: Not on file   Relationships    Social connections:     Talks on phone: Not on file     Gets together: Not on file     Attends [employer] 4481.0 11/14/2019    PROBNP 1093.0 04/21/2019     D-Dimer  Lab Results   Component Value Date    DDIMER 1767.82 11/16/2015     PFTs             Echo     Summary:  11/13/2019     The maximum aortic valve gradient is 29 mmHg, the mean gradient is 19   mmHg, and the peak velocity is 269 cm/s. Normal left ventricle size and systolic function. Ejection fraction was   estimated at55%. There were no regional left ventricular wall motion   abnormalities and wall thickness was increased   Aortic valve appears tricuspid. Aortic valve leaflets are Moderately   calcified. Moderate aortic stenosis is present. There is moderate aortic   stenosis with valve area of 1.1 sq cm. The maximum aortic valve gradient   is 29 mmHg, the mean gradient is 19 mmHg, and the peak velocity is 269   cm/s. Mild aortic regurgitation is noted. The mitral valve structure was calcified normal with normal leaflet   separation. DOPPLER: The transmitral velocity was within the normal range   with no evidence for mitral stenosis. There was moderateevidence of mitral   regurgitation. Signature  Cultures    Procalcitonin  Lab Results   Component Value Date    PROCAL 0.08 04/15/2019    PROCAL < 0.05 11/15/2015     Radiology    CXR  XR CHEST PORTABLE   11/28/2019   FINDINGS: Lines/tubes/devices: none Lungs/pleura:  No pneumonia, pulmonary edema, or obvious mass. There is left lower lobe atelectasis or scarring. No pleural effusion. No pneumothorax. Heart: There is stable mild cardiomegaly. Mediastinum/irma: Again noted is the moderate hiatal hernia projecting at the base of the right hemithorax, confirmed on the chest CT dated 4/17/2019 Skeleton: There is left glenohumeral joint DJD. No acute pneumonia or pulmonary edema. Left lower lobe atelectasis or scarring. Mild cardiomegaly. Moderate hiatal hernia projecting over the base of the right hemithorax.        CT Scans  CTA CHEST W WO CONTRAST   11/29/2019   FINDINGS: Apparent right subclavian artery. There is marked atherosclerotic changes. Ascending thoracic aorta is 4.4 cm. Prominent pulmonary arteries suggesting pulmonary arterial hypertension. No acute pulmonary arterial embolism. There is a large hiatal hernia with the majority of the stomach in the lower right thorax with food debris. There is cardiomegaly. Few shotty mediastinal lymph nodes, nonspecific. No pleural or pericardial effusion. Bibasilar patchy consolidation greatest in the right lung probable atelectasis, infiltrate cannot be excluded. No acute osseous findings. Upper abdominal images with left adrenal gland thickening. Cholecystectomy. 1. No acute pulmonary arterial embolism. Findings suggestive of pulmonary arterial hypertension. 2. Large hiatal hernia with the majority of the stomach in the right hemithorax. Correlation with symptoms is advised. 3.Bibasilar patchy consolidation greatest in the right lung probable atelectasis, infiltrate cannot be excluded. (See actual reports for details)    Assessment   -Acute on chronic hypoxemic respiratory failure  -LLL atelectasis/infiltrates on presentation- resolved  -Moderate AS  -Severe pulmonary hypertension- most likely due to left heart disease.  -Likely SOCORRO/OHS  -Chronic Afib  -CAD  -Large Hiatal Hernia compressing on the RLL  Recommendations   -Monitor SpO2 wean supplemental O2 to maintain SpO2 >90%  -Chronic cough with large hiatal hernia and chronically elevated ProBNP PFT 2013 no evidence of COPD, desaturations could be related to acute pulmonary edema that has resolved with diuretics  -Patient is stable from pulmonary standpoint for transfer to TCU    Case discussed with nurse and patient/family. Questions and concerns addressed. Meds and Orders reviewed. Electronically signed by     LACY Hill CNP on 12/2/2019 at 2:42 PM     Addendum by Dr. Piero Florentino MD:  I have seen and examined the patient independently.  Face to face evaluation and examination was

## 2019-12-02 NOTE — PROGRESS NOTES
UE release from RW. Heavy reliance on grab bar during tasks. Transfers:  Sit to stand: Contact guard assistance(from EOB)  Stand to sit: Minimal assistance(to bedside chair; cues for slow descent into chair although noncompliant)  Toilet Transfers  Toilet - Technique: Ambulating  Equipment Used: Standard toilet(with use of grab bar on L)  Toilet Transfer: Contact guard assistance    Upper Extremity Assessment:   LUE AROM : WFL  RUE AROM : WFL    LUE Strength  Gross LUE Strength: Exceptions to [facility]  LUE Strength Comment: grossly 4-/5  RUE Strength  Gross RUE Strength: Exceptions to [facility]  RUE Strength Comment: grossly 4-/5    Sensation  Overall Sensation Status: WFL(pt denies N/T)       Activity Tolerance: Patient limited by fatigue       Assessment:  Assessment: Pt presents requiring increased assistance for ADLs, transfers, and functional mobility compared to PLOF. Pt will continue to benefit from OT services to improve independence with these tasks, in addition to overall strength/endurance to facilitate return to PLOF. Performance deficits / Impairments: Decreased functional mobility , Decreased ADL status, Decreased strength, Decreased safe awareness, Decreased endurance, Decreased balance, Decreased high-level IADLs  Prognosis: Good  REQUIRES OT FOLLOW UP: Yes  Decision Making: High Complexity  Safety Devices in place: Yes  Type of devices: Call light within reach, Chair alarm in place, Gait belt, Left in chair, Nurse notified    Treatment Initiated: Treatment and education initiated within context of evaluation. Evaluation time included review of current medical information, gathering information related to past medical, social and functional history, completion of standardized testing, formal and informal observation of tasks, assessment of data and development of plan of care and goals.   Treatment time included skilled education and facilitation of tasks to increase safety and independence with ADL's 3: Pt will complete LB ADLs with minimal assistance using LHAE prn to increase independence with self care tasks. Short term goal 4: Pt will complete functional transfers with SBA and 0 cues for safety in prep for toilet transfers. Long term goals  Time Frame for Long term goals : not set due to ELOS         Following session, patient left in safe position with all fall risk precautions in place.

## 2019-12-02 NOTE — CARE COORDINATION
12/2/19, 2:36 PM    Patient goals/plan/ treatment preferences discussed by  and . Patient goals/plan/ treatment preferences reviewed with patient/ family. Patient/ family verbalize understanding of discharge plan and are in agreement with goal/plan/treatment preferences. Understanding was demonstrated using the teach back method. AVS provided which includes all necessary medical information pertaining to the patients current course of illness, treatment, post-discharge goals of care, and treatment preferences. Services After Discharge  Services At/After Discharge: Aide Services, Nursing Services([facility])   [place] Letter  IMM Letter given to Patient/Family/Significant other/Guardian/POA/by[de-identified] Nona Ward CM   IMM Letter date given[de-identified] 12/02/19  IMM Letter time given[de-identified] 1320     Patient discharge today to TCU.

## 2019-12-02 NOTE — PROGRESS NOTES
[address] [address]  Transitional Care Unit Preadmission Assessment    Patient Name: Margarita Elizalde        MRN: [id number]    [place]: [de-identified]    : 1926  (80 y.o.)  Gender: female     Admitted From:  [x][facility] []Outside Admission - Location:  Date of Admission to the hospital:2019  Date patient eligible for admission: 19  Primary Diagnosis Pnemonia  Did patient have surgery? [] Yes- Explain:   [x] No    Physicians:Dr. Mosqueda   Risk for clinical complications/co-morbidities:   Past Medical History:   Diagnosis Date    Blood transfusion reaction     CAD (coronary artery disease)     CH[facility]congestive [facility]) [facility])     CKD (chronic kidney disease) stage 3, GFR 30-59 ml/min ([facility]) 2015    GERD (gastroesophageal reflux disease)     History of bleeding ulcers     Hypertension     Large hiatal hernia 2019    Pneumonia     Thyroid disease    Financial Information  Primary insurance:  [x]Medicare [] Medicare HMO  []Commercial insurance    []Medicaid   []Workers Compensation      Secondary Insurance:  []Medicare [] Medicare HMO  [x]Commercial insurance    []Medicaid   []Workers Compensation []None    Precautions:   []Cardiac Precautions  []Total hip precautions    []Weight Bearing status:  [x]Safety Precautions/Concerns fall precautions  []Visually impaired   []Hard of Hearing     Isolation Precautions:         []Yes  [x]No  If Yes:  [] Droplet  []Contact []Airborne                   []VRE          []MRSA               []C-diff         [] TB  [] Other:       Skills:   [x]Physical therapy     [x]Occupational Therapy   []IV Antibiotics   [] IV meds   [] TPN     []PEG tube Feedings      []New Colostomy   [] Ileostomy care/teaching    [] Speech Therapy   []Wound Vac   []Complex Dressing Changes    []Terminal care    []Other       Has patient had Prior Skilled care in the Last 60 days:  []Yes  [x]NO   If Yes:   Skilled Facility:    How many skilled days were used?

## 2019-12-02 NOTE — PLAN OF CARE
Problem: Falls - Risk of:  Goal: Will remain free from falls  Description  Will remain free from falls  11/25/2019 1456 by Marilu Chicas RN  Outcome: Ongoing  Note:   No falls this shift. Call light in reach. Bed alarm on. Falling star protocol in place. Pt ambulates with standby assist and walker. Problem: Cardiovascular  Goal: Absence of chest pain  11/25/2019 1456 by Marilu Chicas RN  Outcome: Ongoing  Note:   Pt denies any chest pain at this time. Problem: DISCHARGE BARRIERS  Goal: Patient's continuum of care needs are met  11/25/2019 1452 by Marilu Chicas RN  Outcome: Ongoing  Note:   Pt plans to discharge home with family when medically stable. Problem: Pain:  Goal: Pain level will decrease  Description  Pain level will decrease  11/25/2019 1452 by Marilu Chicas RN  Outcome: Ongoing  Note:   Pt denies pain at this time. Will continue to monitor. Care plan reviewed with patient and family. Patient and family verbalize understanding of the plan of care and contribute to goal setting.
Problem: Falls - Risk of:  Goal: Will remain free from falls  Description  Will remain free from falls  Outcome: Ongoing  No falls noted this shift. Patient is able to utilize call light appropriately. Patient ambulates as one assist with a walker. Problem: Pain:  Goal: Control of acute pain  Description  Control of acute pain  Outcome: Ongoing  Patient denies any pain at this time. Will continue to monitor with 0-10 pain scale. Problem: Cardiovascular  Goal: Absence of chest pain  Outcome: Ongoing  Patient denies any chest pain at this time. VSS overnight. Problem: DISCHARGE BARRIERS  Goal: Patient's continuum of care needs are met  Outcome: Ongoing  Patient intends to return to private residence with family upon discharge. Problem: Airway Clearance - Ineffective:  Goal: Ability to maintain a clear airway will improve  Description  Ability to maintain a clear airway will improve  Outcome: Ongoing  Patient is able to maintain a clear airway. Patient has been weaned down to 2L NC at this time and this is her baseline. Care plan reviewed with patient. Patient verbalize understanding of the plan of care and contribute to goal setting.
Problem: Falls - Risk of:  Goal: Will remain free from falls  Description  Will remain free from falls  Outcome: Ongoing  Note:   Call light within reach. Side rails up x2. Bed alarm on. Non skid slippers available. Problem: Pain:  Goal: Pain level will decrease  Description  Pain level will decrease  Outcome: Ongoing  Note:   Patient admitted for chest/shoulder pain. Tylenol given prn. Pillow for comfort. Pain rated on 0-10 pain rating scale. Will continue to reassess. Problem: Cardiovascular  Goal: Absence of chest pain  Outcome: Ongoing  Note:   Patient admitted for chest pain. Possible heart cath? Problem: DISCHARGE BARRIERS  Goal: Patient's continuum of care needs are met  Outcome: Ongoing  Note:   Patient plans to be discharged to home when medically stable. Care plan reviewed with patient. Patient verbalize understanding of the plan of care and contribute to goal setting.
Problem: Falls - Risk of:  Goal: Will remain free from falls  Description  Will remain free from falls  Outcome: Ongoing  Note:   Falling star program in place. Call light within reach. Side rails up x2. Bed alarm on. Non skid slippers on. Problem: Pain:  Goal: Pain level will decrease  Description  Pain level will decrease  Outcome: Ongoing  Note:   Patient c/o intermittent chest heaviness rated 3/10. Problem: Cardiovascular  Goal: Absence of chest pain  Outcome: Ongoing  Note:   Continues to have chest heaviness rated 3/10. Care plan reviewed with patient. Patient verbalizes understanding of the plan of care and contribute to goal setting.
Problem: Falls - Risk of:  Goal: Will remain free from falls  Description  Will remain free from falls  Outcome: Ongoing  Note:   No falls noted at this time. Bed in low position. Call light within reach. Problem: Pain:  Goal: Pain level will decrease  Description  Pain level will decrease  Outcome: Ongoing  Note:   No concerns or pain voiced. Pt able to voice needs and use call system. Problem: Cardiovascular  Goal: Absence of chest pain  Outcome: Ongoing  Note:   No CP voiced. Pt appears to be resting in bed at this time. Problem: DISCHARGE BARRIERS  Goal: Patient's continuum of care needs are met  Outcome: Ongoing  Note:   Pt plans to be discharged home at time of discharge. Problem: Airway Clearance - Ineffective:  Goal: Ability to maintain a clear airway will improve  Description  Ability to maintain a clear airway will improve  Outcome: Ongoing  Note:   Pt on continuous CPAP at this time. No resp distress noted on CPAP. Lung sounds clear diminished. Care plan reviewed with patient and family. Patient and family verbalize understanding of the plan of care and contribute to goal setting.
Problem: Falls - Risk of:  Goal: Will remain free from falls  Description  Will remain free from falls  Outcome: Ongoing  Note:   No falls noted this shift. Continue falling star program. Bed alarm on, bed in low position. Call light and personal belongings in reach. Patient uses call light appropriately. Problem: Pain:  Goal: Control of acute pain  Description  Control of acute pain  Outcome: Ongoing  Note:   Denied pain/need for pain medication. Will continue to monitor throughout the shift. Problem: Cardiovascular  Goal: Absence of chest pain  Outcome: Ongoing  Note:   No c/o chest pain. Problem: DISCHARGE BARRIERS  Goal: Patient's continuum of care needs are met  Outcome: Ongoing  Note:   Pt's daily needs are met. Pt able to verbalize needs. Problem: Airway Clearance - Ineffective:  Goal: Ability to maintain a clear airway will improve  Description  Ability to maintain a clear airway will improve  Outcome: Ongoing  Note:   Wheezes throughout. Scheduled breathing treatments relieve symptoms. On 2 L of supplemental oxygen here and at home. Care plan reviewed with patient. Patient verbalize understanding of the plan of care and contribute to goal setting.
Problem: Falls - Risk of:  Goal: Will remain free from falls  Description  Will remain free from falls  Outcome: Ongoing  Note:   No falls so far this shift, call light and bedside table within reach. Bed in lowest position and alarm on, nonskid socks on bilaterally. Problem: Pain:  Goal: Pain level will decrease  Description  Pain level will decrease  Outcome: Ongoing  Note:   Pt denies pain at this time, will continue to assess using 0-10 pain scale. Pt took tessalon to help with cough. Will continue to assess. No pain goal stated. Problem: Cardiovascular  Goal: Absence of chest pain  Outcome: Ongoing  Note:   No signs or complaints of chest pain at this time, will continue to assess. Problem: DISCHARGE BARRIERS  Goal: Patient's continuum of care needs are met  Outcome: Ongoing  Note:   Pt needs are met at this time, will continue to assist as needed in preparation for discharge to home with daughter per preference. Care plan reviewed with patient. Patient verbalize understanding of the plan of care and contribute to goal setting.
Problem: Falls - Risk of:  Goal: Will remain free from falls  Description  Will remain free from falls  Outcome: Ongoing  Note:   No falls this shift. Call light in reach. Bed alarm on. Falling star protocol in place. Pt ambulates with standby assist and walker. Problem: Pain:  Goal: Pain level will decrease  Description  Pain level will decrease  Outcome: Ongoing  Note:   Pt taking tylenol for headaches as needed     Problem: Cardiovascular  Goal: Absence of chest pain  Outcome: Ongoing  Note:   Pt denies any chest pain at this time. Problem: DISCHARGE BARRIERS  Goal: Patient's continuum of care needs are met  Outcome: Ongoing  Note:   Pt plans on being d/c home with support from daughters when medically stable      Care plan reviewed with patient and family. Patient and family verbalize understanding of the plan of care and contribute to goal setting.
Problem: Falls - Risk of:  Goal: Will remain free from falls  Description  Will remain free from falls  Outcome: Ongoing  Note:   Patient was reeducated about using the call light appropriately. Pt belongings within reach. Pt cautioned against getting up without nurse or staff in room. Bed is set at lowest position, side rails up as appropriate. Problem: Falls - Risk of:  Goal: Absence of physical injury  Description  Absence of physical injury  Outcome: Ongoing  Note:   There has not been any episodes of physical injury at this time in the shift. Problem: Pain:  Goal: Pain level will decrease  Description  Pain level will decrease  Outcome: Ongoing  Note:   Pt pain free at this time. PRN medication available for pain management. Problem: Cardiovascular  Goal: Absence of chest pain  Outcome: Ongoing  Note:   Pt not complaining of chest pain at this time, will continue to reassess. Problem: DISCHARGE BARRIERS  Goal: Patient's continuum of care needs are met  Outcome: Ongoing  Note:   Patient performing ADL's to the best of their ability. Patient encouraged to continue functioning at their highest level of ability in anticipation of discharge. Care plan reviewed with patient. Patient verbalize understanding of the plan of care and contribute to goal setting.
Problem: Impaired respiratory status  Goal: Clear lung sounds  Description  Clear lung sounds     11/21/2019 0849 by Eric Teixeira RCP  Outcome: Ongoing  Note:   Albuterol to improve breath sounds.
Problem: Impaired respiratory status  Goal: Clear lung sounds  Description  Clear lung sounds     11/21/2019 2106 by Yanique Payan RCP  Outcome: Ongoing  Note:   Treatment will be continued as ordered to improve aeration.
Problem: Impaired respiratory status  Goal: Clear lung sounds  Description  Clear lung sounds     11/22/2019 2204 by Elsy Theodore RCP  Outcome: Met This Shift  Note:    Patient lung sounds are considered normal for their current lung condition. No signs of distress noted.  Current treatment regimen appropriate
Problem: Impaired respiratory status  Goal: Clear lung sounds  Description  Clear lung sounds     11/24/2019 2044 by Guillermina Melendez RCP  Outcome: Ongoing  Note:   Pt receiving nebulizer treatments to improve aeration throughout.
Problem: Impaired respiratory status  Goal: Clear lung sounds  Description  Clear lung sounds     11/25/2019 0732 by Black Dodson RCP  Outcome: Ongoing   Continue breathing treatments to improve breathsounds
Problem: Impaired respiratory status  Goal: Clear lung sounds  Description  Clear lung sounds     11/26/2019 2045 by Brittney Gillespie RCP  Outcome: Ongoing  Note:   Treatment will be continued as ordered to improve aeration.
Problem: Impaired respiratory status  Goal: Clear lung sounds  Description  Clear lung sounds     Outcome: Ongoing   Continue aerosols as ordered to improve breath sounds
Problem: Impaired respiratory status  Goal: Clear lung sounds  Description  Clear lung sounds     Outcome: Ongoing   Improve breath sounds, increase aeration and decrease WOB.
Problem: Impaired respiratory status  Goal: Clear lung sounds  Description  Clear lung sounds     Outcome: Ongoing  Note:   Albuterol to improve breath sounds.
Problem: Impaired respiratory status  Goal: Clear lung sounds  Description  Clear lung sounds     Outcome: Ongoing  Note:   Cont albuterol to improve aeration
Problem: Impaired respiratory status  Goal: Clear lung sounds  Description  Clear lung sounds     Outcome: Ongoing  Note:   Pt continues on aerosols to clear lung sounds/improve aeration and pt has aerosols and MDI at home.
Problem: Impaired respiratory status  Goal: Clear lung sounds  Description  Clear lung sounds     Outcome: Ongoing  Note:   Pt has diminished breath sounds. Txs to help improve lung aeration.
Ongoing  Note:   Lungs are clear, diminished throughout. Pt does have a cough. Encouraged to use IS throughout the day. Currently on 5L via NC. Care plan reviewed with patient. Patient verbalize understanding of the plan of care and contribute to goal setting.

## 2020-01-24 NOTE — DISCHARGE SUMMARY
[address] [place] [address]                               DISCHARGE SUMMARY    PATIENT NAME: Cristino Koch                   :        1926  MED REC NO:   [id number]                           ROOM:       0006  ACCOUNT NO:   [de-identified]                           ADMIT DATE: 2019  PROVIDER:     Zhou Flores. Catrina Doe M.D.               Kathleenfernando Nga: 2019      The patient was transferred to the inpatient rehab. FINAL DIAGNOSES:  1. Acute exacerbation of congestive heart failure. 2.  Possible right lower lobe pneumonia. 3.  Severe hypoxemia. 4.  Large hiatal hernia. 5.  Extensive atherosclerotic coronary artery disease. 6.  History of atrial fibrillation. 7.  History of peptic ulcer disease. 8.  History of renal insufficiency. 5.  Old age. BRIEF HISTORY:  This is a 25-year-old lady admitted to the hospital  through the emergency room, came to the emergency room because of  progressive shortness of breath and chest pain. For the rest of the H  and P, please refer to my consultation. HOSPITALIZATION COURSE:  The patient admitted to the telemetry bed. The  patient's troponin was slightly elevated. The patient does not want to  have any invasive procedure. The patient wants to be a DNR. She did  receive IV diuretics for diastolic congestive heart failure. She did  receive the antibiotics for pneumonia. Initially, the patient's  condition improved. Then, she took the condition to the worse where she  became more hypoxic. CTA of the chest showed no PE but large hiatal  hernia. The patient was seen by Pulmonary Service. The patient's  condition continued to deteriorate. The patient wishes again to be a  DNR-CCA. The patient seen by Physical Therapy, Social Service. The  patient was then transferred to the rehab. Her condition was guarded. She continued medical treatment.   She continued conservative treatment. Anita Azar M.D.    D: 01/23/2020 10:39:56       T: 01/23/2020 22:49:32     [email or web address]  Job#: [id number]     Doc#: [id number]    CC:  Mckay Castillo M.D.

## 2025-06-30 NOTE — ED NOTES
[facility]  PT OT-recommend rehab placement  Case management is making rehab [id number] blood cultures have cleared.  Patient will have PICC line placed.  IR consulted.  ID continues to follow.  Plan is for 6 weeks of IV antibiotics on discharge for ID.  Patient is hoping that [facility] will accept him on discharge   To radiology in stable condition      Ramy Torres RN  11/17/17 3533